# Patient Record
Sex: MALE | Race: BLACK OR AFRICAN AMERICAN | NOT HISPANIC OR LATINO | Employment: PART TIME | URBAN - METROPOLITAN AREA
[De-identification: names, ages, dates, MRNs, and addresses within clinical notes are randomized per-mention and may not be internally consistent; named-entity substitution may affect disease eponyms.]

---
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
## 2018-01-07 ENCOUNTER — HEALTH MAINTENANCE LETTER (OUTPATIENT)
Age: 18
End: 2018-01-07

## 2018-01-17 ENCOUNTER — MYC REFILL (OUTPATIENT)
Dept: FAMILY MEDICINE | Facility: CLINIC | Age: 18
End: 2018-01-17

## 2018-01-17 DIAGNOSIS — F64.0 GENDER DYSPHORIA IN ADOLESCENT AND ADULT: ICD-10-CM

## 2018-01-17 RX ORDER — TESTOSTERONE CYPIONATE 200 MG/ML
40 INJECTION, SOLUTION INTRAMUSCULAR WEEKLY
Qty: 2 ML | Refills: 3 | Status: CANCELLED | OUTPATIENT
Start: 2018-01-17

## 2018-01-17 NOTE — TELEPHONE ENCOUNTER
Message from [facility]:  Original authorizing provider: MD Paxton Loredo Masoud would like a refill of the following medications:  testosterone cypionate (DEPOTESTOTERONE) 200 MG/ML injection [Gabe Manley MD]    Preferred pharmacy: [place], [place] - [address]    Comment:

## 2018-01-17 NOTE — TELEPHONE ENCOUNTER
Request for medication refill:    Date of last visit at clinic: 10/23/17    Please complete refill if appropriate and CLOSE ENCOUNTER.    Closing the encounter signifies the refill is complete.    If refill has been denied, please complete the smart phrase .smirefuse and route it to the [facility] RN TRIAGE pool to inform the patient and the pharmacy.    Jessica Metz RN

## 2018-02-21 ENCOUNTER — MYC REFILL (OUTPATIENT)
Dept: FAMILY MEDICINE | Facility: CLINIC | Age: 18
End: 2018-02-21

## 2018-02-21 ENCOUNTER — OFFICE VISIT (OUTPATIENT)
Dept: FAMILY MEDICINE | Facility: CLINIC | Age: 18
End: 2018-02-21
Payer: COMMERCIAL

## 2018-02-21 VITALS
OXYGEN SATURATION: 100 % | SYSTOLIC BLOOD PRESSURE: 109 MMHG | HEART RATE: 79 BPM | BODY MASS INDEX: 26.08 KG/M2 | RESPIRATION RATE: 16 BRPM | TEMPERATURE: 98.4 F | WEIGHT: 142.6 LBS | DIASTOLIC BLOOD PRESSURE: 69 MMHG

## 2018-02-21 DIAGNOSIS — F64.0 GENDER DYSPHORIA IN ADOLESCENT AND ADULT: Primary | ICD-10-CM

## 2018-02-21 DIAGNOSIS — F64.0 GENDER DYSPHORIA IN ADOLESCENT AND ADULT: ICD-10-CM

## 2018-02-21 LAB
% GRANULOCYTES: 61.7 %G (ref 40–75)
ALBUMIN SERPL-MCNC: 4.7 MG/DL (ref 3.8–5)
ALP SERPL-CCNC: 53.8 U/L (ref 31.7–110.5)
ALT SERPL-CCNC: 11.3 U/L (ref 0–45)
AST SERPL-CCNC: 19 U/L (ref 0–35)
BILIRUB SERPL-MCNC: 0.5 MG/DL (ref 0.2–1.3)
BILIRUBIN DIRECT: 0.2 MG/DL (ref 0.1–0.3)
CHOLEST SERPL-MCNC: 158.8 MG/DL (ref 0–200)
CHOLEST/HDLC SERPL: 2.8 {RATIO} (ref 0–5)
GLUCOSE SERPL-MCNC: 87 MG'DL (ref 70–99)
GRANULOCYTES #: 5.4 K/UL (ref 1.6–8.3)
HCT VFR BLD AUTO: 49.7 % (ref 35–47)
HDLC SERPL-MCNC: 56.8 MG/DL
HEMOGLOBIN: 15.7 G/DL (ref 11.7–15.7)
LDLC SERPL CALC-MCNC: 74 MG/DL (ref 0–129)
LYMPHOCYTES # BLD AUTO: 2.8 K/UL (ref 0.8–5.3)
LYMPHOCYTES NFR BLD AUTO: 32.7 %L (ref 20–48)
MCH RBC QN AUTO: 31.6 PG (ref 26.5–35)
MCHC RBC AUTO-ENTMCNC: 31.6 G/DL (ref 32–36)
MCV RBC AUTO: 100.1 FL (ref 78–100)
MID #: 0.5 K/UL (ref 0–2.2)
MID %: 5.6 %M (ref 0–20)
PLATELET # BLD AUTO: 243 K/UL (ref 150–450)
PROT SERPL-MCNC: 8 G/DL (ref 6.8–8.8)
RBC # BLD AUTO: 4.97 M/UL (ref 3.8–5.2)
TRIGL SERPL-MCNC: 139.2 MG/DL (ref 0–150)
VLDL CHOLESTEROL: 27.8 MG/DL (ref 7–32)
WBC # BLD AUTO: 8.7 K/UL (ref 4–11)

## 2018-02-21 NOTE — PATIENT INSTRUCTIONS
Some online resources for transgender health    [place] Transgender Health Coalition   Home to the [facility] at [address], [phone]. Also has support groups.  http://www.[place]transhealth.org/    Center of Excellence for Transgender Health  Increasing access to comprehensive, effective, and affirming healthcare services for trans and gender-variant communities.  http://www.transhealth.[facility].edu/trans?page=mary-00-05    [facility]   Community-based non-profit committed to advancing the health & wellness of LGBTQ communities through research, education and advocacy. http://www.[facility].org    [place] [employer] [facility] Glossary of Transgender Terms  http://www.[employer].org/site/DocServer/Handout_7-C_Glossary_of_Gender_and_Transgender_Terms__fi.pdf?nkfPN=4031    Safe, gender-neutral public restrooms in [facility]   http://www.[facility]transMet[facility]Health [facility] Medical Center.org//index.php?option=com_content&task=view&id=12&Itemid    Trans Youth Support Network  For people 26 and under who identify as a trans or gender non-conforming person and want to be a part of an activist organization. Offers peer support, education and community building opportunities.   http://www.transyouthsupportnetwork.org/    Reclaim:  RECLAIM offers mental and integrative health services for LGBTQ youth and their families.  http://www.reclaim-lgbtyouth.org/    Gender Spectrum, for Trans Youth  Gender Spectrum provides education, training and support to help create a gender sensitive and inclusive environment for all children and teens. http://www.genderspectrum.org/    Brant s FTM Resource Guide  Information on topics of interest to female-to-male (FTM, F2M) trans men, and their friends and loved ones.  http://ftmguide.org/    Also check out your local [employer] resource center!  LGBTQIA Services at [facility]: http://www.[facility]/lgbtqia/  LGBTQ@[facility] at [facility]: http://www.[facility]/multiculturallife/lgbtq/  GLBTA Programs  office at [employer]: https://diversity.[facility]/glbta/

## 2018-02-21 NOTE — PROGRESS NOTES
Preceptor Attestation:  Patient seen and discussed with the resident. Assessment and plan reviewed with resident and agreed upon.  Supervising Physician:  [facility]

## 2018-02-21 NOTE — PROGRESS NOTES
"          JIGAR Matta is a 17 year old individual that uses pronouns { :921455} that presents today for follow up of:  {Masculinizin} hormone therapy. Gender identity: ***    Any special concerns today?  {CONCER:280378::\"no current concerns\"}    Start HRT in 2017.     Started college last month - wants to be . Lives with his dad. Not seeing therapist anymore, did not pay last therapist.     On hormones?  YES +++ Shot day of the week? Wednesday      Due for labs?  Yes      +++ Refills of meds needed?  No  ---    History reviewed. No pertinent surgical history.    Patient Active Problem List   Diagnosis     Gender dysphoria in adolescent and adult     Anxiety     High risk social situation       Current Outpatient Prescriptions   Medication Sig Dispense Refill     testosterone cypionate (DEPOTESTOTERONE) 200 MG/ML injection Inject 0.2 mLs (40 mg) into the muscle once a week 2 mL 3     escitalopram (LEXAPRO) 20 MG tablet Take 1/2 tablet (10 mg) for 1 week, then increase to 1 tablet orally daily 30 tablet 3     Needle, Disp, 25G X 1-1/2\" MISC Use once weekly for administering hormone IM 25 each 3     syringe, disposable, 1 ML MISC Use once weekly to draw up hormones 25 each 3     needle, disp, 18G X 1\" MISC Use to draw up hormones once weekly 25 each 3       History   Smoking Status     Never Smoker   Smokeless Tobacco     Never Used        No Known Allergies    Problem, Medication and Allergy Lists were reviewed and updated if needed..         Review of Systems:      General    Fat redistribution: {.:038588::\"no\"}    Weight change: {.:877059::\"no\"} HEENT    Voice change: YES     Cardiovascular (CV)    Chest Pains: {.:961198::\"no\"}    Shortness of breath: {.:325503::\"no\"} Chest    Decreased exercise tolerance:  {.:475504::\"no\"}    Breast changes/development: {.:447293::\"no\"}     Gastrointestinal (GI)    Abdominal pain: {.:687899::\"no\"}    Change in appetite: {.:772806::\"no\"} Skin    Acne or " "oily skin: {.:637780::\"no\"}    Change in hair: YES- facial hair     Genitourinary ()    Abnormal vaginal bleeding: no LMP 5 months ago    Decreased spontaneous erections: {.:081265::\"no\"}    Change in libido: {.:946643::\"no\"}    New sexual partners: no Musculoskeletal    Leg pain or swelling: no     Psychiatric (Psych)    Depression: YES- well controlled, taking Lexapro    Anxiety/Panic: {.:281649::\"no\"}    Mood:  {MOOD (SUBJECTIVE REPORT):808023}                    Physical Exam:     Vitals:    02/21/18 1349   BP: 109/69   Pulse: 79   Resp: 16   Temp: 98.4  F (36.9  C)   TempSrc: Oral   SpO2: 100%   Weight: 142 lb 9.6 oz (64.7 kg)     BMI= Body mass index is 26.08 kg/(m^2).   Wt Readings from Last 10 Encounters:   02/21/18 142 lb 9.6 oz (64.7 kg) (73 %)*   10/23/17 132 lb 12.8 oz (60.2 kg) (61 %)*   08/03/17 131 lb 6.4 oz (59.6 kg) (59 %)*   05/31/17 134 lb 3.2 oz (60.9 kg) (65 %)*   04/25/17 133 lb 12.8 oz (60.7 kg) (64 %)*   03/20/17 133 lb (60.3 kg) (64 %)*   01/03/17 135 lb (61.2 kg) (67 %)*   11/30/16 142 lb 9.6 oz (64.7 kg) (77 %)*   11/10/16 144 lb (65.3 kg) (79 %)*   10/19/16 149 lb 6.4 oz (67.8 kg) (83 %)*     * Growth percentiles are based on CDC 2-20 Years data.     Appearance: {SEX PARTNERS:446250::\"Male\"} appearance and dress    {EXAM NORMAL:576864::\"GENERAL:: healthy, alert and no distress\"}  Affect: {AFFECT (OBJECTIVE APPEARANCE):335579}           Labs:   {Result Choices:352811}    Assessment and Plan     1. Gender dysphoria in adolescent and adult  - will weit on Testosterone level and then will of dose needs to be adjusted    {HELP TEXT, REFERENCE for hormones at [facility]. Delete when done.}    Contraception:   {CONTRACEPTION:290069}  {HELP TEXT, Delete when done. Testosterone + uterus + sperm exposure needs contraception! Need some form of exogenous sex steroid for long term bone health IF gonadectomy  }.   Counselled patient about controlled substances: {Yes " "(Details)/No:438558010}  {HELP TEXT, Delete when done. If rx for testosterone,  pt its controlled substance, needs paper rx, can never share med. Rec use of 200mg/ml concentration if possible, testosterone cypionate  .}  Follow up:  {FOLLOW UP TIME FRAMES (DB):746153::\"Follow up in 4 months\"}  [facility]  Questions were elicited and answered.     Amee Holguin MD    "

## 2018-02-21 NOTE — MR AVS SNAPSHOT
After Visit Summary   2/21/2018    Varsha Meredith    MRN: [id number]           Patient Information     Date Of Birth          2000        Visit Information        Provider Department      2/21/2018 2:00 PM Amee Holguin MD [facility]        Today's Diagnoses     Gender dysphoria in adolescent and adult    -  1      Care Instructions      Some online resources for transgender health    [place] Transgender Health Coalition   Home to the [facility] at [address] [place] [address], [phone]. Also has support groups.  http://www.[place]transhealth.org/    Center of Excellence for Transgender Health  Increasing access to comprehensive, effective, and affirming healthcare services for trans and gender-variant communities.  http://www.transhealth.[facility].edu/trans?page=mary-00-05    [facility]   Community-based non-profit committed to advancing the health & wellness of LGBTQ communities through research, education and advocacy. http://www.[employer]ibuddies.org    [place] [facility] Glossary of Transgender Terms  http://www.[employer].org/site/DocServer/Handout_7-C_Glossary_of_Gender_and_Transgender_Terms__fi.pdf?dasMG=0763    Safe, gender-neutral public restrooms in the [facility]   http://www.[facility].org//index.php?option=com_content&task=view&id=12&Itemid    Trans Youth Support Network  For people 26 and under who identify as a trans or gender non-conforming person and want to be a part of an activist organization. Offers peer support, education and community building opportunities.   http://www.transyouthsupportnetwork.org/    Reclaim:  RECLAIM offers mental and integrative health services for LGBTQ youth and their families.  http://www.reclaim-lgbtyouth.org/    Gender Spectrum, for Trans Youth  Gender Spectrum provides education, training and support to help create a gender sensitive and inclusive environment for all children and teens.  http://www.genderspectrum.org/    Brant s FTM Resource Guide  Information on topics of interest to female-to-male (FTM, F2M) trans men, and their friends and loved ones.  http://ftmguide.org/    Also check out your local [employer] resource center!  LGBTQIA Services at [facility]: http://www.[facility]/lgbtqia/  LGBTQ@[facility] at [facility]: http://www.[facility]/lgbtq/  GLBTA Programs office at [facility]: https://diversity.[facility]/glbta/                  Follow-ups after your visit        Who to contact     Please call your clinic at [phone] to:    Ask questions about your health    Make or cancel appointments    Discuss your medicines    Learn about your test results    Speak to your doctor            Additional Information About Your Visit        [employer] Information     [employer] gives you secure access to your electronic health record. If you see a primary care provider, you can also send messages to your care team and make appointments. If you have questions, please call your primary care clinic.  If you do not have a primary care provider, please call [phone] and they will assist you.      [employer] is an electronic gateway that provides easy, online access to your medical records. With [employer], you can request a clinic appointment, read your test results, renew a prescription or communicate with your care team.     To access your existing account, please contact your [facility] or call [phone] for assistance.        Care EveryWhere ID     This is your Care EveryWhere ID. This could be used by other organizations to access your [facility] medical records  [id number]        Your Vitals Were     Pulse Temperature Respirations Pulse Oximetry BMI (Body Mass Index)       79 98.4  F (36.9  C) (Oral) 16 100% 26.08 kg/m2        Blood Pressure from Last 3 Encounters:   02/21/18 109/69   10/23/17 95/52   08/03/17 97/64    Weight from Last 3 Encounters:  "  02/21/18 142 lb 9.6 oz (64.7 kg) (73 %)*   10/23/17 132 lb 12.8 oz (60.2 kg) (61 %)*   08/03/17 131 lb 6.4 oz (59.6 kg) (59 %)*     * Growth percentiles are based on [facility] 2-20 Years data.              We Performed the Following     CBC with Diff Plt     Glucose     Hepatic Panel     Lipid Cascade     Testosterone Total        Primary Care Provider Office Phone # Fax #    Amee Holguin [phone]       [facility] [address] [facility] [address]        Equal Access to Services     CARRIE BARCENAS : Hadii hunter Forde, waaxda gogo, qaybta kaalmada cl, dany valdivia . So [facility] [phone].    ATENCIÓN: Si habla español, tiene a brothers disposición servicios gratuitos de asistencia lingüística. Llame al [phone].    We comply with applicable federal civil rights laws and [place] laws. We do not discriminate on the basis of race, color, national origin, age, disability, sex, sexual orientation, or gender identity.            Thank you!     Thank you for choosing [facility]  for your care. Our goal is always to provide you with excellent care. Hearing back from our patients is one way we can continue to improve our services. Please take a few minutes to complete the written survey that you may receive in the mail after your visit with us. Thank you!             Your Updated Medication List - Protect others around you: Learn how to safely use, store and throw away your medicines at www.disposemymeds.org.          This list is accurate as of 2/21/18  2:46 PM.  Always use your most recent med list.                   Brand Name Dispense Instructions for use Diagnosis    escitalopram 20 MG tablet    LEXAPRO    30 tablet    Take 1/2 tablet (10 mg) for 1 week, then increase to 1 tablet orally daily    Adjustment disorder with depressed mood       needle (disp) 18G X 1\" Misc     25 each    Use to draw up hormones once " "weekly    Gender dysphoria in adolescent and adult       Needle (Disp) 25G X 1-1/2\" Misc     25 each    Use once weekly for administering hormone IM    Gender dysphoria in adolescent and adult       syringe (disposable) 1 ML Misc     25 each    Use once weekly to draw up hormones    Gender dysphoria in adolescent and adult       testosterone cypionate 200 MG/ML injection    DEPOTESTOTERONE    2 mL    Inject 0.2 mLs (40 mg) into the muscle once a week    Gender dysphoria in adolescent and adult         "

## 2018-02-21 NOTE — PROGRESS NOTES
"       JIGAR Matta is a 17 year old individual that uses pronouns He/Him/His/Himself that presents today for follow up of:  masculinizing hormone therapy. Gender identity: Male    Any special concerns today?  no current concerns    Started HRT in 4/2017.     Started college last month - wants to be . Lives with his dad. Not seeing therapist anymore, did not pay last therapist.     On hormones?  YES +++ Shot day of the week? Wednesday      Due for labs?  Yes      +++ Refills of meds needed?  Yes  ---    History reviewed. No pertinent surgical history.    Patient Active Problem List   Diagnosis     Gender dysphoria in adolescent and adult     Anxiety     High risk social situation       Current Outpatient Prescriptions   Medication Sig Dispense Refill     testosterone cypionate (DEPOTESTOTERONE) 200 MG/ML injection Inject 0.2 mLs (40 mg) into the muscle once a week 2 mL 3     escitalopram (LEXAPRO) 20 MG tablet Take 1/2 tablet (10 mg) for 1 week, then increase to 1 tablet orally daily 30 tablet 3     Needle, Disp, 25G X 1-1/2\" MISC Use once weekly for administering hormone IM 25 each 3     syringe, disposable, 1 ML MISC Use once weekly to draw up hormones 25 each 3     needle, disp, 18G X 1\" MISC Use to draw up hormones once weekly 25 each 3       History   Smoking Status     Never Smoker   Smokeless Tobacco     Never Used        No Known Allergies    Problem, Medication and Allergy Lists were reviewed and are current.         Review of Systems:      General    Fat redistribution: no    Weight change: no HEENT    Voice change: YES     Cardiovascular (CV)    Chest Pains: no    Shortness of breath: no Chest    Decreased exercise tolerance:  no    Breast changes/development: no     Gastrointestinal (GI)    Abdominal pain: no    Change in appetite: no Skin    Acne or oily skin: no    Change in hair: no     Genitourinary ()    Abnormal vaginal bleeding: no     Decreased spontaneous erections: not " "applicable    Change in libido: YES- increased    New sexual partners: no Musculoskeletal    Leg pain or swelling: no     Psychiatric (Psych)    Depression: no    Anxiety/Panic: no    Mood:  \"good\"                    Physical Exam:     Vitals:    02/21/18 1349   BP: 109/69   Pulse: 79   Resp: 16   Temp: 98.4  F (36.9  C)   TempSrc: Oral   SpO2: 100%   Weight: 142 lb 9.6 oz (64.7 kg)     BMI= Body mass index is 26.08 kg/(m^2).   Wt Readings from Last 10 Encounters:   02/21/18 142 lb 9.6 oz (64.7 kg) (73 %)*   10/23/17 132 lb 12.8 oz (60.2 kg) (61 %)*   08/03/17 131 lb 6.4 oz (59.6 kg) (59 %)*   05/31/17 134 lb 3.2 oz (60.9 kg) (65 %)*   04/25/17 133 lb 12.8 oz (60.7 kg) (64 %)*   03/20/17 133 lb (60.3 kg) (64 %)*   01/03/17 135 lb (61.2 kg) (67 %)*   11/30/16 142 lb 9.6 oz (64.7 kg) (77 %)*   11/10/16 144 lb (65.3 kg) (79 %)*   10/19/16 149 lb 6.4 oz (67.8 kg) (83 %)*     * Growth percentiles are based on [facility] 2-20 Years data.     Appearance: Both appearance and dress    GENERAL:: healthy, alert and no distress  RESP: lungs clear to auscultation - no rales, no rhonchi, no wheezes  CV: regular rates and rhythm, normal S1 S2, no S3 or S4 and no murmur, no click or rub -  Affect: Appropriate/mood-congruent Seems to much happier           Labs:   HRT follow up labs obtained.   Assessment and Plan     ## Gender dysphoria - follow up and doing very well.   - encouraged to find a new (affordable) therapist. Provided with transgender health resources.   - continue with the current treatment - no changes made today.     Contraception:   not needed     Counselled patient about controlled substances: Yes.     Follow up:  Follow up in 3 months.  Results by mychart  Questions were elicited and answered.       Amee Holguin MD, PhD  [facility],  PGY-3 Family Medicine Resident  #[id number]    "

## 2018-02-22 PROBLEM — Z60.9 HIGH RISK SOCIAL SITUATION: Status: RESOLVED | Noted: 2017-03-25 | Resolved: 2018-02-22

## 2018-02-22 NOTE — TELEPHONE ENCOUNTER
Message from [employer]:  Original authorizing provider: MD Paxton Loredo Masoud would like a refill of the following medications:  testosterone cypionate (DEPOTESTOTERONE) 200 MG/ML injection [Gabe Manley MD]    Preferred pharmacy: [place], [place] - [address]    Comment:

## 2018-02-22 NOTE — TELEPHONE ENCOUNTER
Date of last visit at clinic: 2/21/18    Please complete refill and CLOSE ENCOUNTER.  Closing the encounter signifies the refill is complete.    Lakeshia Cassidy RN

## 2018-02-22 NOTE — TELEPHONE ENCOUNTER
Please process per controlled substance protocol. I have reviewed pt's chart and this refill is appropriate. Please provide rx to preceptor to sign, then provide to patient's pharmacy and notify patient.     Amee Holguin MD  [facility],  PGY-3 Family Medicine Resident  #[id number]

## 2018-02-23 RX ORDER — TESTOSTERONE CYPIONATE 200 MG/ML
40 INJECTION, SOLUTION INTRAMUSCULAR WEEKLY
Qty: 2 ML | Refills: 3 | Status: SHIPPED | OUTPATIENT
Start: 2018-02-23 | End: 2018-03-16

## 2018-02-24 LAB — TESTOST SERPL-MCNC: 279 NG/DL (ref 8–60)

## 2018-04-24 ENCOUNTER — OFFICE VISIT (OUTPATIENT)
Dept: FAMILY MEDICINE | Facility: CLINIC | Age: 18
End: 2018-04-24
Payer: COMMERCIAL

## 2018-04-24 VITALS
WEIGHT: 150.8 LBS | SYSTOLIC BLOOD PRESSURE: 115 MMHG | TEMPERATURE: 98.1 F | OXYGEN SATURATION: 98 % | HEART RATE: 87 BPM | DIASTOLIC BLOOD PRESSURE: 76 MMHG | BODY MASS INDEX: 27.58 KG/M2

## 2018-04-24 DIAGNOSIS — F64.0 GENDER DYSPHORIA IN ADOLESCENT AND ADULT: Primary | ICD-10-CM

## 2018-04-24 RX ORDER — TESTOSTERONE CYPIONATE 200 MG/ML
60 INJECTION, SOLUTION INTRAMUSCULAR WEEKLY
Qty: 2 ML | Refills: 0 | Status: SHIPPED | OUTPATIENT
Start: 2018-04-24 | End: 2018-06-20

## 2018-04-24 NOTE — PROGRESS NOTES
"            JIGAR Matta is a 17 year old individual that uses pronouns He/Him/His/Himself that presents today for follow up of:  masculinizing hormone therapy. Gender identity: male    Any special concerns today?  no current concerns  Started HRT 4/26/2017, satisfied with changes    On hormones?  YES +++ Shot day of the week? Wednesday      Due for labs?  No      +++ Refills of meds needed?  No  ---    History reviewed. No pertinent surgical history.    Patient Active Problem List   Diagnosis     Gender dysphoria in adolescent and adult     Anxiety       Current Outpatient Prescriptions   Medication Sig Dispense Refill     escitalopram (LEXAPRO) 20 MG tablet Take 1/2 tablet (10 mg) for 1 week, then increase to 1 tablet orally daily 30 tablet 3     needle, disp, 18G X 1\" MISC Use to draw up hormones once weekly 25 each 3     Needle, Disp, 25G X 1-1/2\" MISC Use once weekly for administering hormone IM 25 each 3     syringe, disposable, 1 ML MISC Use once weekly to draw up hormones 25 each 3     testosterone cypionate (DEPOTESTOTERONE) 200 MG/ML injection Inject 0.2 mLs (40 mg) into the muscle once a week 2 mL 3       History   Smoking Status     Never Smoker   Smokeless Tobacco     Never Used        No Known Allergies    Problem, Medication and Allergy Lists were reviewed and updated if needed..         Review of Systems:      General    Fat redistribution: no    Weight change: no HEENT    Voice change: YES     Cardiovascular (CV)    Chest Pains: no    Shortness of breath: no Chest    Decreased exercise tolerance:  no    Breast changes/development: not applicable     Gastrointestinal (GI)    Abdominal pain: no    Change in appetite: no Skin    Acne or oily skin: no    Change in hair: no     Genitourinary ()    Abnormal vaginal bleeding: no     Decreased spontaneous erections: not applicable    Change in libido: YES    New sexual partners: no Musculoskeletal    Leg pain or swelling: no     Psychiatric " "(Psych)    Depression: no    Anxiety/Panic: no    Mood:  \"good\"                    Physical Exam:     Vitals:    04/24/18 1513   BP: 115/76   Pulse: 87   Temp: 98.1  F (36.7  C)   TempSrc: Oral   SpO2: 98%   Weight: 150 lb 12.8 oz (68.4 kg)     BMI= Body mass index is 27.58 kg/(m^2).   Wt Readings from Last 10 Encounters:   04/24/18 150 lb 12.8 oz (68.4 kg) (81 %)*   02/21/18 142 lb 9.6 oz (64.7 kg) (73 %)*   10/23/17 132 lb 12.8 oz (60.2 kg) (61 %)*   08/03/17 131 lb 6.4 oz (59.6 kg) (59 %)*   05/31/17 134 lb 3.2 oz (60.9 kg) (65 %)*   04/25/17 133 lb 12.8 oz (60.7 kg) (64 %)*   03/20/17 133 lb (60.3 kg) (64 %)*   01/03/17 135 lb (61.2 kg) (67 %)*   11/30/16 142 lb 9.6 oz (64.7 kg) (77 %)*   11/10/16 144 lb (65.3 kg) (79 %)*     * Growth percentiles are based on [facility] 2-20 Years data.     Appearance: Male appearance and dress    GENERAL: healthy, alert and no distress  RESP: lungs clear to auscultation - no rales, no rhonchi, no wheezes  CV: regular rates and rhythm, normal S1 S2, no S3 or S4 and no murmur, no click or rub -  Affect: Appropriate/mood-congruent           Labs:   No pending results.   Assessment and Plan     ## Gender dysphoria in adolescent and adult  ## Depression with anxiety  - testosterone cypionate (DEPOTESTOTERONE) 200 MG/ML injection; Inject 0.3 mLs (60 mg) into the muscle once a week  Dispense: 2 mL; Refill: 0  - patient was advised to remember that he has all other refills from February and March and call pharmacy first of you need refills  - follow up in 1 month, will need labs  - recommended to continue/reestablish therapy  - continue with Lexapro for depression/anxiety which are well controlled at this point    Contraception:   not needed    Counselled patient about controlled substances: Yes.    Follow up:  Follow up in 1-3 month, will need labs  Results by carleen  Questions were elicited and answered.     Amee Holguin MD, PhD  [facility],  PGY-3 " Family Medicine Resident  #[id number]

## 2018-04-24 NOTE — MR AVS SNAPSHOT
After Visit Summary   4/24/2018    Varsha Meredith    MRN: [id number]           Patient Information     Date Of Birth          2000        Visit Information        Provider Department      4/24/2018 3:20 PM Amee Holguin MD [facility]        Today's Diagnoses     Gender dysphoria in adolescent and adult    -  1      Care Instructions    Here is the plan from today's visit    1. Gender dysphoria in adolescent and adult  - testosterone cypionate (DEPOTESTOTERONE) 200 MG/ML injection; Inject 0.3 mLs (60 mg) into the muscle once a week  Dispense: 2 mL; Refill: 0  - remember that you have all other refills form February and March so please call your pharmacy first of you need refills  - follow up in 1 month, will do labs then        Thank you for coming to [facility] today.  Lab Testing:  **If you had lab testing today and your results are reassuring or normal they will be mailed to you or sent through [employer] within 7 days.   **If the lab tests need quick action we will call you with the results.  The phone number we will call with results is # [phone] (home) . If this is not the best number please call our clinic and change the number.  Medication Refills:  If you need any refills please call your pharmacy and they will contact us.   If you need to  your refill at a new pharmacy, please contact the new pharmacy directly. The new pharmacy will help you get your medications transferred faster.   Scheduling:  If you have any concerns about today's visit or wish to schedule another appointment please call our office during normal business hours [phone] (8-5:00 M-F)  If a referral was made to a [facility] and you don't get a call from central scheduling please call [phone].  If a Mammogram was ordered for you at [facility] call [phone] to schedule or change your appointment.  If you had an XRay/CT/Ultrasound/MRI  ordered the number is [phone] to schedule or change your radiology appointment.   Medical Concerns:  If you have urgent medical concerns please call [phone] at any time of the day.    Amee Holguin MD            Follow-ups after your visit        Who to contact     Please call your clinic at [phone] to:    Ask questions about your health    Make or cancel appointments    Discuss your medicines    Learn about your test results    Speak to your doctor            Additional Information About Your Visit        [employer] Information     [employer] gives you secure access to your electronic health record. If you see a primary care provider, you can also send messages to your care team and make appointments. If you have questions, please call your primary care clinic.  If you do not have a primary care provider, please call [phone] and they will assist you.      [employer] is an electronic gateway that provides easy, online access to your medical records. With [employer], you can request a clinic appointment, read your test results, renew a prescription or communicate with your care team.     To access your existing account, please contact your [facility] or call [phone] for assistance.        Care EveryWhere ID     This is your Care EveryWhere ID. This could be used by other organizations to access your [place] medical records  [id number]        Your Vitals Were     Pulse Temperature Pulse Oximetry Breastfeeding? BMI (Body Mass Index)       87 98.1  F (36.7  C) (Oral) 98% No 27.58 kg/m2        Blood Pressure from Last 3 Encounters:   04/24/18 115/76   02/21/18 109/69   10/23/17 95/52    Weight from Last 3 Encounters:   04/24/18 150 lb 12.8 oz (68.4 kg) (81 %)*   02/21/18 142 lb 9.6 oz (64.7 kg) (73 %)*   10/23/17 132 lb 12.8 oz (60.2 kg) (61 %)*     * Growth percentiles are based on CDC 2-20 Years data.              Today, you had the following     No orders found for  display         Today's Medication Changes          These changes are accurate as of 4/24/18  3:42 PM.  If you have any questions, ask your nurse or doctor.               These medicines have changed or have updated prescriptions.        Dose/Directions    testosterone cypionate 200 MG/ML injection   Commonly known as:  DEPOTESTOTERONE   This may have changed:  how much to take   Used for:  Gender dysphoria in adolescent and adult   Changed by:  Amee Holguin MD        Dose:  60 mg   Inject 0.3 mLs (60 mg) into the muscle once a week   Quantity:  2 mL   Refills:  0            Where to get your medicines      Some of these will need a paper prescription and others can be bought over the counter.  Ask your nurse if you have questions.     Bring a paper prescription for each of these medications     testosterone cypionate 200 MG/ML injection                Primary Care Provider Office Phone # Fax #    Amee Holguin [phone]       [facility] [address] [facility] [address]        Equal Access to Services     CARRIE BARCENAS AH: Hadsaige vilelgaso Sodeonte, waaxda luqadaha, qaybta kaalmada adedoroteoyablaise, dany valdivia . So [facility] [phone].    ATENCIÓN: Si cynthiala kike, tiene a brothers disposición servicios gratuitos de asistencia lingüística. Llame al [phone].    We comply with applicable federal civil rights laws and [place] laws. We do not discriminate on the basis of race, color, national origin, age, disability, sex, sexual orientation, or gender identity.            Thank you!     Thank you for choosing [facility]  for your care. Our goal is always to provide you with excellent care. Hearing back from our patients is one way we can continue to improve our services. Please take a few minutes to complete the written survey that you may receive in the mail after your visit with us. Thank you!             Your Updated  "Medication List - Protect others around you: Learn how to safely use, store and throw away your medicines at www.disposemymeds.org.          This list is accurate as of 4/24/18  3:42 PM.  Always use your most recent med list.                   Brand Name Dispense Instructions for use Diagnosis    escitalopram 20 MG tablet    LEXAPRO    30 tablet    Take 1/2 tablet (10 mg) for 1 week, then increase to 1 tablet orally daily    Adjustment disorder with depressed mood       needle (disp) 18G X 1\" Misc     25 each    Use to draw up hormones once weekly    Gender dysphoria in adolescent and adult       Needle (Disp) 25G X 1-1/2\" Misc     25 each    Use once weekly for administering hormone IM    Gender dysphoria in adolescent and adult       syringe (disposable) 1 ML Misc     25 each    Use once weekly to draw up hormones    Gender dysphoria in adolescent and adult       testosterone cypionate 200 MG/ML injection    DEPOTESTOTERONE    2 mL    Inject 0.3 mLs (60 mg) into the muscle once a week    Gender dysphoria in adolescent and adult         "

## 2018-04-24 NOTE — PATIENT INSTRUCTIONS
Here is the plan from today's visit    1. Gender dysphoria in adolescent and adult  - testosterone cypionate (DEPOTESTOTERONE) 200 MG/ML injection; Inject 0.3 mLs (60 mg) into the muscle once a week  Dispense: 2 mL; Refill: 0  - remember that you have all other refills form February and March so please call your pharmacy first of you need refills  - follow up in 1 month, will do labs then        Thank you for coming to [facility] today.  Lab Testing:  **If you had lab testing today and your results are reassuring or normal they will be mailed to you or sent through [employer] within 7 days.   **If the lab tests need quick action we will call you with the results.  The phone number we will call with results is # [phone] (home) . If this is not the best number please call our clinic and change the number.  Medication Refills:  If you need any refills please call your pharmacy and they will contact us.   If you need to  your refill at a new pharmacy, please contact the new pharmacy directly. The new pharmacy will help you get your medications transferred faster.   Scheduling:  If you have any concerns about today's visit or wish to schedule another appointment please call our office during normal business hours [phone] (8-5:00 M-F)  If a referral was made to a [facility] and you don't get a call from central scheduling please call [phone].  If a Mammogram was ordered for you at [facility] call [phone] to schedule or change your appointment.  If you had an XRay/CT/Ultrasound/MRI ordered the number is [phone] to schedule or change your radiology appointment.   Medical Concerns:  If you have urgent medical concerns please call [phone] at any time of the day.    Amee Holguin MD

## 2018-05-01 NOTE — PROGRESS NOTES
Preceptor Attestation:   Patient seen, evaluated and discussed with the resident.   I have verified the content of the note, which accurately reflects my assessment of the patient and the plan of care.   Supervising Physician:  Nathaniel Esqueda MD

## 2018-05-10 DIAGNOSIS — F64.0 GENDER DYSPHORIA IN ADULT: Primary | ICD-10-CM

## 2018-05-16 DIAGNOSIS — F64.0 GENDER DYSPHORIA IN ADULT: ICD-10-CM

## 2018-05-16 NOTE — TELEPHONE ENCOUNTER
Request for medication refill:    Date of last visit at clinic: 4/24/2018    Please complete refill if appropriate and CLOSE ENCOUNTER.    Closing the encounter signifies the refill is complete.    If refill has been denied, please complete the smart phrase .smirefuse and route it to the [facility] RN TRIAGE pool to inform the patient and the pharmacy.    Nadiya Perez, CMA

## 2018-05-21 ENCOUNTER — OFFICE VISIT (OUTPATIENT)
Dept: FAMILY MEDICINE | Facility: CLINIC | Age: 18
End: 2018-05-21
Payer: COMMERCIAL

## 2018-05-21 VITALS
BODY MASS INDEX: 27.65 KG/M2 | TEMPERATURE: 97.9 F | DIASTOLIC BLOOD PRESSURE: 61 MMHG | OXYGEN SATURATION: 98 % | SYSTOLIC BLOOD PRESSURE: 95 MMHG | WEIGHT: 151.2 LBS | HEART RATE: 95 BPM | RESPIRATION RATE: 16 BRPM

## 2018-05-21 DIAGNOSIS — F64.0 GENDER DYSPHORIA IN ADOLESCENT AND ADULT: Primary | ICD-10-CM

## 2018-05-21 LAB
% GRANULOCYTES: 62.8 %G (ref 40–75)
ALBUMIN SERPL-MCNC: 4.7 MG/DL (ref 3.8–5)
ALP SERPL-CCNC: 53.8 U/L (ref 31.7–110.5)
ALT SERPL-CCNC: 9.8 U/L (ref 0–45)
AST SERPL-CCNC: 16.8 U/L (ref 0–35)
BILIRUB SERPL-MCNC: 0.7 MG/DL (ref 0.2–1.3)
BILIRUBIN DIRECT: 0.3 MG/DL (ref 0.1–0.3)
CHOLEST SERPL-MCNC: 143.7 MG/DL (ref 0–200)
CHOLEST/HDLC SERPL: 3 {RATIO} (ref 0–5)
GLUCOSE SERPL-MCNC: 96 MG'DL (ref 70–99)
GRANULOCYTES #: 4.3 K/UL (ref 1.6–8.3)
HCT VFR BLD AUTO: 49.6 % (ref 35–47)
HDLC SERPL-MCNC: 48.5 MG/DL
HEMOGLOBIN: 15.4 G/DL (ref 11.7–15.7)
LDLC SERPL CALC-MCNC: 86 MG/DL (ref 0–129)
LYMPHOCYTES # BLD AUTO: 2 K/UL (ref 0.8–5.3)
LYMPHOCYTES NFR BLD AUTO: 29.9 %L (ref 20–48)
MCH RBC QN AUTO: 30.4 PG (ref 26.5–35)
MCHC RBC AUTO-ENTMCNC: 31 G/DL (ref 32–36)
MCV RBC AUTO: 97.8 FL (ref 78–100)
MID #: 0.5 K/UL (ref 0–2.2)
MID %: 7.3 %M (ref 0–20)
PLATELET # BLD AUTO: 209 K/UL (ref 150–450)
PROT SERPL-MCNC: 7.2 G/DL (ref 6.8–8.8)
RBC # BLD AUTO: 5.07 M/UL (ref 3.8–5.2)
TRIGL SERPL-MCNC: 46.8 MG/DL (ref 0–150)
VLDL CHOLESTEROL: 9.4 MG/DL (ref 7–32)
WBC # BLD AUTO: 6.8 K/UL (ref 4–11)

## 2018-05-21 NOTE — MR AVS SNAPSHOT
After Visit Summary   5/21/2018    Varsha Meredith    MRN: [id number]           Patient Information     Date Of Birth          2000        Visit Information        Provider Department      5/21/2018 2:40 PM Amee Holguin MD [facility]        Today's Diagnoses     Gender dysphoria in adolescent and adult    -  1       Follow-ups after your visit        Follow-up notes from your care team     Return in about 3 months (around 8/21/2018).      Who to contact     Please call your clinic at [phone] to:    Ask questions about your health    Make or cancel appointments    Discuss your medicines    Learn about your test results    Speak to your doctor            Additional Information About Your Visit        [employer] Information     [employer] gives you secure access to your electronic health record. If you see a primary care provider, you can also send messages to your care team and make appointments. If you have questions, please call your primary care clinic.  If you do not have a primary care provider, please call [phone] and they will assist you.      [employer] is an electronic gateway that provides easy, online access to your medical records. With [employer], you can request a clinic appointment, read your test results, renew a prescription or communicate with your care team.     To access your existing account, please contact your [facility] or call [phone] for assistance.        Care EveryWhere ID     This is your Care EveryWhere ID. This could be used by other organizations to access your [place] medical records  [id number]        Your Vitals Were     Pulse Temperature Respirations Pulse Oximetry BMI (Body Mass Index)       95 97.9  F (36.6  C) (Oral) 16 98% 27.65 kg/m2        Blood Pressure from Last 3 Encounters:   05/21/18 95/61   04/24/18 115/76   02/21/18 109/69    Weight from Last 3 Encounters:   05/21/18 151 lb 3.2 oz  "(68.6 kg) (81 %)*   04/24/18 150 lb 12.8 oz (68.4 kg) (81 %)*   02/21/18 142 lb 9.6 oz (64.7 kg) (73 %)*     * Growth percentiles are based on [facility] 2-20 Years data.              We Performed the Following     CBC with Diff Plt     Glucose     Hepatic Panel     Lipid Cascade     Testosterone Total          Today's Medication Changes          These changes are accurate as of 5/21/18 11:59 PM.  If you have any questions, ask your nurse or doctor.               Start taking these medicines.        Dose/Directions    Needle (Disp) 25G X 1-1/2\" Misc   Used for:  Gender dysphoria in adolescent and adult   Started by:  Amee Holguin MD        Use once weekly for administering hormone IM   Quantity:  25 each   Refills:  3            Where to get your medicines      These medications were sent to [facility] [address] [facility] - [place] [address]  [address] , [facility] [address]     Phone:  [phone]     Needle (Disp) 25G X 1-1/2\" Misc                Primary Care Provider Office Phone # Fax #    Amee Holguin [phone]       [facility] [address]   [facility] [address]        Equal Access to Services     CARRIE BARCENAS AH: Hadii hunter ku hadasho Soomaali, waaxda luqadaha, qaybta kaalmada adeegyada, waxay idiin haydaltonn sydney robbins. So [facility] [phone].    ATENCIÓN: Si habla español, tiene a brothers disposición servicios gratuitos de asistencia lingüística. Llame al [phone].    We comply with applicable federal civil rights laws and [place] laws. We do not discriminate on the basis of race, color, national origin, age, disability, sex, sexual orientation, or gender identity.            Thank you!     Thank you for choosing [facility]  for your care. Our goal is always to provide you with excellent care. Hearing back from our patients is one way we can continue to improve our services. Please take a few minutes to complete the written " "survey that you may receive in the mail after your visit with us. Thank you!             Your Updated Medication List - Protect others around you: Learn how to safely use, store and throw away your medicines at www.disposemymeds.org.          This list is accurate as of 5/21/18 11:59 PM.  Always use your most recent med list.                   Brand Name Dispense Instructions for use Diagnosis    escitalopram 20 MG tablet    LEXAPRO    30 tablet    Take 1/2 tablet (10 mg) for 1 week, then increase to 1 tablet orally daily    Adjustment disorder with depressed mood       Needle (Disp) 25G X 1-1/2\" Misc     25 each    Use once weekly for administering hormone IM    Gender dysphoria in adolescent and adult       syringe (disposable) 1 ML Misc     25 each    Use to draw up hormones once weekly    Gender dysphoria in adult       testosterone cypionate 200 MG/ML injection    DEPOTESTOTERONE    2 mL    Inject 0.3 mLs (60 mg) into the muscle once a week    Gender dysphoria in adolescent and adult         "

## 2018-05-21 NOTE — PROGRESS NOTES
JIGAR Matta is a 17 year old individual that uses pronouns He/Him/His/Himself that presents today for follow up of:  masculinizing hormone therapy. Gender identity: male.     Moved to a new house yesterday.     Any special concerns today?  no current concerns    On hormones?  YES +++ Shot day of the week? Wednesday      Due for labs?  Yes      +++ Refills of meds needed?  No    Seeing therapist almost every week (Satursdays).   ---    History reviewed. No pertinent surgical history.    Patient Active Problem List   Diagnosis     Gender dysphoria in adolescent and adult     Anxiety       Current Outpatient Prescriptions   Medication Sig Dispense Refill     escitalopram (LEXAPRO) 20 MG tablet Take 1/2 tablet (10 mg) for 1 week, then increase to 1 tablet orally daily 30 tablet 3     syringe, disposable, 1 ML MISC Use to draw up hormones once weekly 25 each 3     testosterone cypionate (DEPOTESTOTERONE) 200 MG/ML injection Inject 0.3 mLs (60 mg) into the muscle once a week 2 mL 0       History   Smoking Status     Never Smoker   Smokeless Tobacco     Never Used        No Known Allergies    Problem, Medication and Allergy Lists were reviewed and updated if needed..         Review of Systems:      General    Fat redistribution: no    Weight change: no HEENT    Voice change: YES     Cardiovascular (CV)    Chest Pains: no    Shortness of breath: no Chest    Decreased exercise tolerance:  no    Breast changes/development: noh    Has some chest discomfort form the breast binder. Started thinking about top surgery but not ready yet.      Gastrointestinal (GI)    Abdominal pain: no    Change in appetite: no Skin    Acne or oily skin: no    Change in hair: no     Genitourinary ()    Abnormal vaginal bleeding: no     Decreased spontaneous erections: not applicable    Change in libido: no    New sexual partners: no Musculoskeletal    Leg pain or swelling: no     Psychiatric (Psych)    Depression:  "no    Anxiety/Panic: no    Mood:  \"good\"                    Physical Exam:     Vitals:    05/21/18 1441   BP: 95/61   BP Location: Left arm   Patient Position: Sitting   Cuff Size: Adult Regular   Pulse: 95   Resp: 16   Temp: 97.9  F (36.6  C)   TempSrc: Oral   SpO2: 98%   Weight: 151 lb 3.2 oz (68.6 kg)     BMI= Body mass index is 27.65 kg/(m^2).   Wt Readings from Last 10 Encounters:   05/21/18 151 lb 3.2 oz (68.6 kg) (81 %)*   04/24/18 150 lb 12.8 oz (68.4 kg) (81 %)*   02/21/18 142 lb 9.6 oz (64.7 kg) (73 %)*   10/23/17 132 lb 12.8 oz (60.2 kg) (61 %)*   08/03/17 131 lb 6.4 oz (59.6 kg) (59 %)*   05/31/17 134 lb 3.2 oz (60.9 kg) (65 %)*   04/25/17 133 lb 12.8 oz (60.7 kg) (64 %)*   03/20/17 133 lb (60.3 kg) (64 %)*   01/03/17 135 lb (61.2 kg) (67 %)*   11/30/16 142 lb 9.6 oz (64.7 kg) (77 %)*     * Growth percentiles are based on [facility] 2-20 Years data.     Appearance: Both appearance and dress    GENERAL: healthy, alert and no distress  RESP: lungs clear to auscultation - no rales, no rhonchi, no wheezes  CV: regular rates and rhythm, normal S1 S2, no S3 or S4 and no murmur, no click or rub -  Affect: Appropriate/mood-congruent           Labs:   HRT labs obtained today  Assessment and Plan     1. Gender dysphoria in adolescent and adult  - continue with Testosterone at 60 mg weekly, no changes made today  - recommend to continue to see therapist  - follow up in 3 months  - discussed referral for Dr Altamirano to discuss top surgery however Paxton is not ready yet.     Contraception:   abstinence     Counselled patient about controlled substances: Yes.    Follow up:  Follow up in 3 months.  Results by mychart  Questions were elicited and answered.     Amee Holguin MD, PhD  [facility] [employer],  PGY-3 Family Medicine Resident  #[id number]      "

## 2018-05-23 LAB — TESTOST SERPL-MCNC: 446 NG/DL (ref 8–60)

## 2018-06-20 ENCOUNTER — MYC REFILL (OUTPATIENT)
Dept: FAMILY MEDICINE | Facility: CLINIC | Age: 18
End: 2018-06-20

## 2018-06-20 DIAGNOSIS — F64.0 GENDER DYSPHORIA IN ADOLESCENT AND ADULT: ICD-10-CM

## 2018-06-21 RX ORDER — TESTOSTERONE CYPIONATE 200 MG/ML
60 INJECTION, SOLUTION INTRAMUSCULAR WEEKLY
Qty: 2 ML | Refills: 3 | Status: SHIPPED | OUTPATIENT
Start: 2018-06-21 | End: 2018-10-12

## 2018-06-21 NOTE — TELEPHONE ENCOUNTER
Message from MyChart:  Original authorizing provider: Nathaniel Esqueda MD    Paxton Meredith would like a refill of the following medications:  testosterone cypionate (DEPOTESTOTERONE) 200 MG/ML injection [Nathaniel Esqueda MD]    Preferred pharmacy: [place], [place] - [address]    Comment:

## 2018-06-21 NOTE — TELEPHONE ENCOUNTER

## 2018-06-21 NOTE — TELEPHONE ENCOUNTER
Please process per controlled substance protocol. I have reviewed pt's chart and this refill is appropriate. Please provide rx to preceptor to sign, then provide to patient's pharmacy and notify patient.     Amee Holguin MD  [facility],  PGY-3 Family Medicine Resident  #[id number]

## 2018-08-01 ENCOUNTER — MYC REFILL (OUTPATIENT)
Dept: FAMILY MEDICINE | Facility: CLINIC | Age: 18
End: 2018-08-01

## 2018-08-01 DIAGNOSIS — F64.0 GENDER DYSPHORIA IN ADOLESCENT AND ADULT: ICD-10-CM

## 2018-08-01 RX ORDER — TESTOSTERONE CYPIONATE 200 MG/ML
60 INJECTION, SOLUTION INTRAMUSCULAR WEEKLY
Qty: 2 ML | Refills: 3 | Status: CANCELLED | OUTPATIENT
Start: 2018-08-01

## 2018-08-02 NOTE — TELEPHONE ENCOUNTER
Message from MyChart:  Original authorizing provider: MD Paxton Schaefer would like a refill of the following medications:  testosterone cypionate (DEPOTESTOTERONE) 200 MG/ML injection [Natalya Araiza MD]    Preferred pharmacy: [place], [place] - [address]    Comment:

## 2018-08-14 ENCOUNTER — OFFICE VISIT (OUTPATIENT)
Dept: FAMILY MEDICINE | Facility: CLINIC | Age: 18
End: 2018-08-14
Payer: COMMERCIAL

## 2018-08-14 VITALS
TEMPERATURE: 97.6 F | SYSTOLIC BLOOD PRESSURE: 108 MMHG | BODY MASS INDEX: 27.73 KG/M2 | WEIGHT: 151.6 LBS | RESPIRATION RATE: 16 BRPM | DIASTOLIC BLOOD PRESSURE: 72 MMHG | OXYGEN SATURATION: 99 % | HEART RATE: 82 BPM

## 2018-08-14 DIAGNOSIS — F64.0 GENDER DYSPHORIA IN ADULT: ICD-10-CM

## 2018-08-14 DIAGNOSIS — F64.0 GENDER DYSPHORIA IN ADOLESCENT AND ADULT: ICD-10-CM

## 2018-08-14 NOTE — MR AVS SNAPSHOT
"              After Visit Summary   8/14/2018    Varsha Meredith    MRN: [id number]           Patient Information     Date Of Birth          2000        Visit Information        Provider Department      8/14/2018 1:00 PM Lisa Hernandez MD \[facility]        Today's Diagnoses     Gender dysphoria in adult        Gender dysphoria in adolescent and adult          Care Instructions    Here is the plan from today's visit    1. Gender dysphoria in adult  - syringe, disposable, 1 ML MISC; Use to draw up hormones once weekly  Dispense: 25 each; Refill: 3  - needle, disp, 18G X 1\" MISC; Use to draw up hormones once weekly  Dispense: 25 each; Refill: 3  - Repeat all labs next visit November 15th (lipid panel, testosterone level, CMP, CBC).     Please call or return to clinic if your symptoms don't go away.    Follow up plan  Please make a clinic appointment for follow up with your primary physician Lisa Hernandez MD in 3 months.      Thank you for coming to [facility] today.  Lab Testing:  **If you had lab testing today and your results are reassuring or normal they will be mailed to you or sent through [employer] within 7 days.   **If the lab tests need quick action we will call you with the results.  The phone number we will call with results is # [phone] (home) . If this is not the best number please call our clinic and change the number.  Medication Refills:  If you need any refills please call your pharmacy and they will contact us.   If you need to  your refill at a new pharmacy, please contact the new pharmacy directly. The new pharmacy will help you get your medications transferred faster.   Scheduling:  If you have any concerns about today's visit or wish to schedule another appointment please call our office during normal business hours [phone] (8-5:00 M-F)  If a referral was made to a [facility] and you don't get a call from [place] " scheduling please call [phone].  If a Mammogram was ordered for you at [facility] call [phone] to schedule or change your appointment.  If you had an XRay/CT/Ultrasound/MRI ordered the number is [phone] to schedule or change your radiology appointment.   Medical Concerns:  If you have urgent medical concerns please call [phone] at any time of the day.    Lisa Hernandez MD            Follow-ups after your visit        Who to contact     Please call your clinic at [phone] to:    Ask questions about your health    Make or cancel appointments    Discuss your medicines    Learn about your test results    Speak to your doctor            Additional Information About Your Visit        Easy[employer] Information     [employer] gives you secure access to your electronic health record. If you see a primary care provider, you can also send messages to your care team and make appointments. If you have questions, please call your primary care clinic.  If you do not have a primary care provider, please call [phone] and they will assist you.      [employer] is an electronic gateway that provides easy, online access to your medical records. With [employer], you can request a clinic appointment, read your test results, renew a prescription or communicate with your care team.     To access your existing account, please contact your [facility] or call [phone] for assistance.        Care EveryWhere ID     This is your Care EveryWhere ID. This could be used by other organizations to access your [facility] medical records  [id number]        Your Vitals Were     Pulse Temperature Respirations Pulse Oximetry BMI (Body Mass Index)       82 97.6  F (36.4  C) (Oral) 16 99% 27.73 kg/m2        Blood Pressure from Last 3 Encounters:   08/14/18 108/72   05/21/18 95/61   04/24/18 115/76    Weight from Last 3 Encounters:   08/14/18 151 lb 9.6 oz (68.8 kg)   05/21/18 151 lb 3.2 oz (68.6 kg)  "(81 %)*   04/24/18 150 lb 12.8 oz (68.4 kg) (81 %)*     * Growth percentiles are based on [facility] 2-20 Years data.              Today, you had the following     No orders found for display         Today's Medication Changes          These changes are accurate as of 8/14/18  1:43 PM.  If you have any questions, ask your nurse or doctor.               Start taking these medicines.        Dose/Directions    needle (disp) 18G X 1\" Misc   Used for:  Gender dysphoria in adolescent and adult   Started by:  Lisa Hernandez MD        Use to draw up hormones once weekly   Quantity:  25 each   Refills:  3            Where to get your medicines      These medications were sent to [facility] [address] [place] [facility] [place] [address]   [address] [facility] [address]     Phone:  [phone]     needle (disp) 18G X 1\" Misc    syringe (disposable) 1 ML Misc                Primary Care Provider Office Phone # Fax #    Lisa Hernandez [phone]       [address] [address]        Equal Access to Services     ROCÍO BARCENAS : Hadii hunter ku hadasho Soomaali, waaxda luqadaha, qaybta kaalmada adedoroteoyablaise, dany robbins. So [facility] [phone].    ATENCIÓN: Si habla español, tiene a brothers disposición servicios gratuitos de asistencia lingüística. Hayder al [phone].    We comply with applicable federal civil rights laws and [place] laws. We do not discriminate on the basis of race, color, national origin, age, disability, sex, sexual orientation, or gender identity.            Thank you!     Thank you for choosing [facility]  for your care. Our goal is always to provide you with excellent care. Hearing back from our patients is one way we can continue to improve our services. Please take a few minutes to complete the written survey that you may receive in the mail after your visit with us. Thank you!             Your Updated Medication " "List - Protect others around you: Learn how to safely use, store and throw away your medicines at www.disposemymeds.org.          This list is accurate as of 8/14/18  1:43 PM.  Always use your most recent med list.                   Brand Name Dispense Instructions for use Diagnosis    escitalopram 20 MG tablet    LEXAPRO    30 tablet    Take 1/2 tablet (10 mg) for 1 week, then increase to 1 tablet orally daily    Adjustment disorder with depressed mood       needle (disp) 18G X 1\" Misc     25 each    Use to draw up hormones once weekly    Gender dysphoria in adolescent and adult       Needle (Disp) 25G X 1-1/2\" Misc     25 each    Use once weekly for administering hormone IM    Gender dysphoria in adolescent and adult       syringe (disposable) 1 ML Misc     25 each    Use to draw up hormones once weekly    Gender dysphoria in adult       testosterone cypionate 200 MG/ML injection    DEPOTESTOTERONE    2 mL    Inject 0.3 mLs (60 mg) into the muscle once a week    Gender dysphoria in adolescent and adult         "

## 2018-08-14 NOTE — PROGRESS NOTES
"       HPI       Varsha Meredith is a 18 year old  who presents for   Chief Complaint   Patient presents with     Follow Up For     HRT- abdominal cramps off and on for 2 weeks. Has not had menses for over 1 year. No spotting per pt. Took ibuprofen which helped a little. Some bloating.     Refill Request     syringes and 18G needles       Groin/pelvic pain     Onset: 2 days (2 weeks ago)    Description:   Character: Cramping  Location: pelvic region  Radiation: None    Intensity: 7/10    Progression of Symptoms:  Resolved now    Accompanying Signs & Symptoms:  Fever/Chills?: No   Gas/Bloating: No   Dysuria:No   Hematuria: No   Nausea:  YES, only during the first day  Vomiting: No   Diarrhea:No   Constipation: NO       History:           Trauma: No   Previous similar pain:  YES, previously when used to have periods (LMP > 1 year ago)   Previous tests done: none    Precipitating factors:   Does the pain change with:     Food?: no     BM?: no     Urination:no     What makes it better?: Unsure - now resolved      Therapies Tried and outcome: ibuprofen, Details:improved     LMP:  > 1 year ago.          +++++++      Problem, Medication and Allergy Lists were      Patient Active Problem List    Diagnosis Date Noted     Gender dysphoria in adolescent and adult 10/19/2016     Priority: Medium     Anxiety 10/19/2016     Priority: Medium   ,     Current Outpatient Prescriptions   Medication Sig Dispense Refill     escitalopram (LEXAPRO) 20 MG tablet Take 1/2 tablet (10 mg) for 1 week, then increase to 1 tablet orally daily 30 tablet 3     Needle, Disp, 25G X 1-1/2\" MISC Use once weekly for administering hormone IM 25 each 3     syringe, disposable, 1 ML MISC Use to draw up hormones once weekly 25 each 3     testosterone cypionate (DEPOTESTOTERONE) 200 MG/ML injection Inject 0.3 mLs (60 mg) into the muscle once a week 2 mL 3   ,   No Known Allergies.    Patient is   an established patient of this clinic.  Past Medical " History:   Diagnosis Date     Depressive disorder      Family History   Problem Relation Age of Onset     Leukemia Paternal Grandmother      Hypertension Paternal Grandfather      Skin Cancer Other      Social History     Social History     Marital status: Single     Spouse name: N/A     Number of children: N/A     Years of education: N/A     Social History Main Topics     Smoking status: Never Smoker     Smokeless tobacco: Never Used     Alcohol use No     Drug use: No     Sexual activity: No     Other Topics Concern     None     Social History Narrative   .         Review of Systems:   Review of Systems  Skin: negative except as above  Respiratory: negative except as above  Cardiovascular: negative except as above  Gastrointestinal: negative except as above  Genitourinary: negative except as above  Musculoskeletal: negative except as above  Neurologic: negative except as above  Psychiatric: negative except as above  Hematologic/Lymphatic/Immunologic: negative except as above  Endocrine: negative except as above         Physical Exam:     Vitals:    08/14/18 1320   BP: 108/72   Pulse: 82   Resp: 16   Temp: 97.6  F (36.4  C)   TempSrc: Oral   SpO2: 99%   Weight: 151 lb 9.6 oz (68.8 kg)     Body mass index is 27.73 kg/(m^2).  Vitals were reviewed and were normal     Physical Exam  Constitutional: Oriented to person, place, and time. Appears well-developed and well-nourished. small amount of facial hair noted.   Cardiovascular: Normal rate, regular rhythm, normal heart sounds and intact distal pulses.    Pulmonary/Chest: Effort normal and breath sounds normal. No respiratory distress. No wheezes.   Abdominal: Soft. Exhibits no distension. Mild vague TTP in right mid abdomen, no guarding/rebound tenderness.   Musculoskeletal: Normal range of motion.   Neurological: Alert and oriented to person, place, and time.   Skin: Skin is warm and dry.   Psychiatric: Has a normal mood and affect. Behavior is normal.       Results:  "  None    Assessment and Plan        1. Gender dysphoria in adult  Refills ordered today for syringes and needles to draw. Has been undergoing lab repeats every 3 months. Currently therapeutic with testosterone and would repeat 6 months from previous and then possibly a year after if no changes are made in dosing. No obvious side effects reported by patient.   - syringe, disposable, 1 ML MISC; Use to draw up hormones once weekly  Dispense: 25 each; Refill: 3  - needle, disp, 18G X 1\" MISC; Use to draw up hormones once weekly  Dispense: 25 each; Refill: 3  - Repeat all labs next visit November 15th (lipid panel, testosterone level, CMP, CBC).     2. Pelvic pain now resolved  Patient was concerned about previous pain however did not have any evidence of menstrual flow with spotting. At this time would monitor for next episode as there is no clear indication to workup. Exam was benign this visit.   - Would recommend urinalysis vs. Pelvic US if symptoms return.     Please call or return to clinic if your symptoms don't go away.    Follow up plan  Please make a clinic appointment for follow up with your primary physician Lisa Hernandez MD in 3 months.      Thank you for coming to [facility] today.       There are no discontinued medications.    Options for treatment and follow-up care were reviewed with the patient. Varsha Meredith  engaged in the decision making process and verbalized understanding of the options discussed and agreed with the final plan.    Lisa Hernandez MD  "

## 2018-08-14 NOTE — PATIENT INSTRUCTIONS
"Here is the plan from today's visit    1. Gender dysphoria in adult  - syringe, disposable, 1 ML MISC; Use to draw up hormones once weekly  Dispense: 25 each; Refill: 3  - needle, disp, 18G X 1\" MISC; Use to draw up hormones once weekly  Dispense: 25 each; Refill: 3  - Repeat all labs next visit November 15th (lipid panel, testosterone level, CMP, CBC).     2. Pelvic pain now resolved  Patient was concerned about previous pain however did not have any evidence of menstrual flow with spotting. At this time would monitor for next episode as there is no clear indication to workup. Exam was benign this visit.   - Would recommend urinalysis vs. Pelvic US if symptoms return.     Please call or return to clinic if your symptoms don't go away.    Follow up plan  Please make a clinic appointment for follow up with your primary physician Lisa Hernandez MD in 3 months.      Thank you for coming to [facility] today.  Lab Testing:  **If you had lab testing today and your results are reassuring or normal they will be mailed to you or sent through [employer] within 7 days.   **If the lab tests need quick action we will call you with the results.  The phone number we will call with results is # [phone] (home) . If this is not the best number please call our clinic and change the number.  Medication Refills:  If you need any refills please call your pharmacy and they will contact us.   If you need to  your refill at a new pharmacy, please contact the new pharmacy directly. The new pharmacy will help you get your medications transferred faster.   Scheduling:  If you have any concerns about today's visit or wish to schedule another appointment please call our office during normal business hours [phone] (8-5:00 M-F)  If a referral was made to a [facility] and you don't get a call from central scheduling please call [phone].  If a Mammogram was ordered for you at [facility] call " [phone] to schedule or change your appointment.  If you had an XRay/CT/Ultrasound/MRI ordered the number is [phone] to schedule or change your radiology appointment.   Medical Concerns:  If you have urgent medical concerns please call [phone] at any time of the day.    Lisa Hernandez MD

## 2018-08-14 NOTE — PROGRESS NOTES
Preceptor Attestation:   Patient seen, evaluated and discussed with the resident. I have verified the content of the note, which accurately reflects my assessment of the patient and the plan of care.   Supervising Physician:  Natalya Araiza MD

## 2018-09-10 ENCOUNTER — TELEPHONE (OUTPATIENT)
Dept: FAMILY MEDICINE | Facility: CLINIC | Age: 18
End: 2018-09-10

## 2018-09-10 NOTE — TELEPHONE ENCOUNTER
Requesting a prior authorization for Testosterone. The insurance information is listed below. Please call the insurance and initiate the PA process or if you need information on a covered alternative.      Plan ID: [id number]      N:[id number]    [id number]    Group:      Telephone: 1 [phone]

## 2018-09-11 NOTE — TELEPHONE ENCOUNTER
PA Initiation    Medication: testosterone cyp 200 MG/ML inj  Insurance Company: [employer] - Phone [phone] Fax [phone]  Pharmacy Filling the Rx: [id number] [id number] [place] [facility] [place], [place] - [address] [place] [address]  Filling Pharmacy Phone: [phone]  Filling Pharmacy Fax: [phone]  Start Date: 9/11/2018

## 2018-09-11 NOTE — TELEPHONE ENCOUNTER
Prior Authorization Approval    Authorization Effective Date: 8/12/2018  Authorization Expiration Date: 9/10/2021  Medication: testosterone cyp 200 MG/ML inj - P/A APPROVED  Approved Dose/Quantity: 2ml  Reference #: [id number] [id number]   Insurance Company: [employer] - Phone [phone] Fax [phone]  Expected CoPay:       CoPay Card Available:      Foundation Assistance Needed:    Which Pharmacy is filling the prescription (Not needed for infusion/clinic administered): [facility]  Pharmacy Notified: Yes   Patient Notified:

## 2018-09-11 NOTE — TELEPHONE ENCOUNTER
Central Prior Authorization Team   Phone: [phone]    Submitted P/A demographics to insurance, awaiting question set.

## 2018-10-12 ENCOUNTER — MYC REFILL (OUTPATIENT)
Dept: FAMILY MEDICINE | Facility: CLINIC | Age: 18
End: 2018-10-12

## 2018-10-12 DIAGNOSIS — F64.0 GENDER DYSPHORIA IN ADOLESCENT AND ADULT: ICD-10-CM

## 2018-10-15 NOTE — TELEPHONE ENCOUNTER
Message from MyChart:  Original authorizing provider: MD Paxton Schaefer would like a refill of the following medications:  testosterone cypionate (DEPOTESTOTERONE) 200 MG/ML injection [Natalya Araiza MD]    Preferred pharmacy: [place], [place] - [address]    Comment:

## 2018-10-15 NOTE — TELEPHONE ENCOUNTER
Please refill prescription for testosterone per protocol. I have reviewed the dose and administration as well as the refill quantity and agree with all.     Thank you,    MD [facility]  PGY-2  Pager #: [phone]

## 2018-10-16 RX ORDER — TESTOSTERONE CYPIONATE 200 MG/ML
60 INJECTION, SOLUTION INTRAMUSCULAR WEEKLY
Qty: 2 ML | Refills: 3 | Status: SHIPPED | OUTPATIENT
Start: 2018-10-16 | End: 2018-11-15

## 2018-11-15 ENCOUNTER — OFFICE VISIT (OUTPATIENT)
Dept: FAMILY MEDICINE | Facility: CLINIC | Age: 18
End: 2018-11-15
Payer: COMMERCIAL

## 2018-11-15 VITALS
OXYGEN SATURATION: 96 % | RESPIRATION RATE: 16 BRPM | TEMPERATURE: 98.1 F | WEIGHT: 155.6 LBS | SYSTOLIC BLOOD PRESSURE: 115 MMHG | BODY MASS INDEX: 28.46 KG/M2 | HEART RATE: 82 BPM | DIASTOLIC BLOOD PRESSURE: 78 MMHG

## 2018-11-15 DIAGNOSIS — F64.0 GENDER DYSPHORIA IN ADOLESCENT AND ADULT: Primary | ICD-10-CM

## 2018-11-15 LAB
% GRANULOCYTES: 58.3 %G (ref 40–75)
GLUCOSE SERPL-MCNC: 76 MG'DL (ref 70–99)
GRANULOCYTES #: 4.7 K/UL (ref 1.6–8.3)
HCT VFR BLD AUTO: 51.5 % (ref 35–47)
HEMOGLOBIN: 16.1 G/DL (ref 11.7–15.7)
LYMPHOCYTES # BLD AUTO: 2.8 K/UL (ref 0.8–5.3)
LYMPHOCYTES NFR BLD AUTO: 34.8 %L (ref 20–48)
MCH RBC QN AUTO: 31 PG (ref 26.5–35)
MCHC RBC AUTO-ENTMCNC: 31.3 G/DL (ref 32–36)
MCV RBC AUTO: 99.2 FL (ref 78–100)
MID #: 0.6 K/UL (ref 0–2.2)
MID %: 6.9 %M (ref 0–20)
PLATELET # BLD AUTO: 239 K/UL (ref 150–450)
RBC # BLD AUTO: 5.19 M/UL (ref 3.8–5.2)
WBC # BLD AUTO: 8 K/UL (ref 4–11)

## 2018-11-15 RX ORDER — TESTOSTERONE CYPIONATE 200 MG/ML
60 INJECTION, SOLUTION INTRAMUSCULAR WEEKLY
Qty: 1.3 ML | Refills: 3 | Status: SHIPPED | OUTPATIENT
Start: 2018-11-15 | End: 2018-12-19

## 2018-11-15 NOTE — PATIENT INSTRUCTIONS
Some online resources for transgender health    [place] [facility]   Home to the [facility] at [address], [phone]. Also has support groups.  http://www.[place]transhealth.org/    [facility] Transgender [facility]  Increasing access to comprehensive, effective, and affirming healthcare services for trans and gender-variant communities.  http://www.transhealth.[facility].edu/trans?page=mary-00-05    [facility]   Community-based non-profit committed to advancing the health & wellness of LGBTQ communities through research, education and advocacy. http://www.[facility].org    [facility] Glossary of Transgender Terms  http://www.[employer] [facility].org/site/DocServer/Handout_7-C_Glossary_of_Gender_and_Transgender_Terms__fi.pdf?jcyPT=9660    Safe, gender-neutral public restrooms in [facility]   http://www.[facility].org//index.php?option=com_content&task=view&id=12&Itemid    Trans Youth Support Network  For people 26 and under who identify as a trans or gender non-conforming person and want to be a part of an activist organization. Offers peer support, education and community building opportunities.   http://www.transyouthsupportnetwork.org/    Reclaim:  RECLAIM offers mental and integrative health services for LGBTQ youth and their families.  http://www.reclaim-lgbtyouth.org/    Gender Spectrum, for Trans Youth  Gender Spectrum provides education, training and support to help create a gender sensitive and inclusive environment for all children and teens. http://www.genderspectrum.org/    [facility] FTM Resource Guide  Information on topics of interest to female-to-male (FTM, F2M) trans men, and their friends and loved ones.  http://ftmguide.org/    Also check out your local [facility] resource center!  LGBTQIA Services at [facility]: http://www.[facility]/lgbtqia/  LGBTQ@[facility]: http://www.[facility]/multiculturallife/lgbtq/  GLBTA Programs  office at [facility]: https://diversity.[facility]/glbta/            Thoughts of self harm?   Trans Lifeline can be reached at [phone].   This service is staffed by trans people 24/7.   For LGBT youth (ages 24 and younger) contemplating suicide, the [employer] Lifeline can be reached at [phone].           MASCULINIZING Medications, Labs and f/u for Transmen (FTM)Updated 6/2016  Drug Dose s/p oophorectomy  Miscellaneous Details   Testosterone cypionate   (Depo Testosterone)  Start: 25-50mg IM qwk  Typical: 50-75mg IM qwk  Max: 100mg IM qwk Typical: 30-60mg IM qwk Needles: 23-25ga 1inch (for thigh) to 1.5inch (for glut), 1cc syringe, 18ga to draw up. Bottle sizes 1ml or 10ml. Allergy? Carrier oil is cottonseed oil   Testosterone enanthate   (Delatestryl) Start: 25-50mg IM qwk  Typical: 60-80mg IM qwk  Max: 100mg IM qwk Typical: 40-80mg IM qwk Allergy? Carrier oil is sesame oil.    Testosterone gel (Androgel 1.62%) Start: 40.25mg daily (2 pumps)  Typical: 40-80mg (2-4pumps)  Max: 6 pumps 1-2 pumps daily  4 hrs to dry, apply to shoulders & upper arms, behind knees thighs, armpits, can txfr to others.   40.25mg testosterone = 2.5g gel= 2 pumps   Testosterone gel  (Testim 1%) Start: 2.5 g daily  Typical: 5mg daily  Max: 10 g daily  4 hrs to dry, apply to shoulders & upper arms, can txfr to others. 50mg testosterone= 5 grams gel. Comes in premeasured tubes.   Testosterone Soln (Axiron)   Start: 60mg daily (2 pumps)  Typical: 60-120mg (2-4p) Max: 120 mg (4 p) 1-2 pumps daily 30mg testosterone = 1.5 ml solution = 1 pump  2 pumps = 1 pump each axilla   Androderm patch  Typical: 5-10m/24hr patch 2.5mg patch Disliked due to very large size, consider switch if possible   Testosterone proprionate 1-2% cream compounded Start: 25mg daily   Typical: 50-75mg daily  Max: 100mg daily 25-50mg daily Compounded.  Recommend [facility], [facility] [address] (monthly cost ~$40) or [facility] [address]. Compound in PLO gel, or  ask for recs if not avail.     LABS Hgb Fasting Glu/A1C Fasting Lipid LFT s Total testosterone   On shots? Draw labs Mid  cycle! (  way btw shots) After=after start or dose change -Baseline  -3 mo after   -6mo after  -then annual   -Baseline  -3 mo after   -6mo after  -then annual -Baseline  -3 mo after   -6mo after  -then annual  Statins prn. -Baseline  -3 mo after   -6mo after  -q1yr Abnl? Hep serology, RUQ US -Baseline  -3 mo after   -6mo after, then annual.  Goal range 300-1000,  push ? to induce changes, drop after 2yrs   -    -0Psychotherapy: not required! REC if: no consolidated gender, not gender dysphoric by criteria, nonconforming & may not need hormones      -Testosterone therapy is CONTRAINDICATED IF unstable psychosis, bipolar, or PREGNANCY. Need excellent contraception if sperm!  -   Gender related effects: stop menses, ?voice, ?facial/body hair, ?muscle mass, redistrib body fat, acne, ?libido, ?fertility, ?clitoris  -   Other effects: ?Weight, emotional lability, ?lipids, vaginal atrophy, ? BP, ?RBC, ?LFT s transiently, ?WILMER, ?endometrial hyperplasia ayaan  since higher incidence of PCOS in transmen    -surveillance of periods is critical for long term T  -   Testosterone is a controlled substance. Educate about this. Follow for reasonable refills. Paper rx required.    Adapted by Sherlyn Reyes MD from:  Dept Public Health, [facility] Trans* Center of Excellence, Critical access hospital Guidelines, [facility] SOC-7. Updated 6/2016 Trans Guidelines [facility]         Effects and Expected Time Course of Masculinizing  Hormones    Effect Expected Onset Expected Maximum Effect   Skin oiliness/Acne 1-6 months 1-2 years   Facial/body hair growth 3-6 months 3-5 years    Scalp hair loss >12 months+ Variable   Increased muscle mass/strength 6-12 months 2-5 years   Body fat redistribution 3-6 months 2-5 years   Cessation of menses 2-6 months n/a   Clitoral enlargement 3-6 months 1-2 years   Vaginal atrophy 3-6 months 1-2 years    Deepened voice 3-12 months 1-2 years         Here is the plan from today's visit    1. Gender dysphoria in adolescent and adult    - Testosterone Total  - Hepatic Panel  - CBC with Diff Plt  - Lipid Cascade  - Glucose  - testosterone cypionate (DEPOTESTOSTERONE) 200 MG/ML injection; Inject 0.3 mLs (60 mg) into the muscle once a week  Dispense: 1.3 mL; Refill: 3      Please call or return to clinic if your symptoms don't go away.    Follow up plan  Please make a clinic appointment for follow up with me (TISHA SERRANO) 1-2 months to re-evaluate dosing based on today's labs  Thank you for coming to [facility] today.  Lab Testing:  **If you had lab testing today and your results are reassuring or normal they will be mailed to you or sent through [place] within 7 days.   **If the lab tests need quick action we will call you with the results.  The phone number we will call with results is # [phone] (home) . If this is not the best number please call our clinic and change the number.  Medication Refills:  If you need any refills please call your pharmacy and they will contact us.   If you need to  your refill at a new pharmacy, please contact the new pharmacy directly. The new pharmacy will help you get your medications transferred faster.   Scheduling:  If you have any concerns about today's visit or wish to schedule another appointment please call our office during normal business hours [phone] (8-5:00 M-F)  If a referral was made to a [facility] and you don't get a call from central scheduling please call [phone].  If a Mammogram was ordered for you at [facility] call [phone] to schedule or change your appointment.  If you had an XRay/CT/Ultrasound/MRI ordered the number is [phone] to schedule or change your radiology appointment.   Medical Concerns:  If you have urgent medical concerns please call [phone] at any time of the  day.    Lloyd Capone MD

## 2018-11-15 NOTE — PROGRESS NOTES
"       JIGAR Matta is a 18 year old individual that uses pronouns He/Him/His/Himself that presents today for follow up of:  masculinizing hormone therapy. Gender identity: Male    Any special concerns today?  concerns about progress of hormones and pain due to injections    On hormones?  YES +++ Shot day of the week? Wednesday      Due for labs?  yes     +++ Refills of meds needed?  Yes  ---    History reviewed. No pertinent surgical history.    Patient Active Problem List   Diagnosis     Gender dysphoria in adolescent and adult     Anxiety       Current Outpatient Prescriptions   Medication Sig Dispense Refill     escitalopram (LEXAPRO) 20 MG tablet Take 1/2 tablet (10 mg) for 1 week, then increase to 1 tablet orally daily 30 tablet 3     needle, disp, 18G X 1\" MISC Use to draw up hormones once weekly 25 each 3     Needle, Disp, 25G X 1-1/2\" MISC Use once weekly for administering hormone IM 25 each 3     syringe, disposable, 1 ML MISC Use to draw up hormones once weekly 25 each 3     testosterone cypionate (DEPOTESTOSTERONE) 200 MG/ML injection Inject 0.3 mLs (60 mg) into the muscle once a week 2 mL 3       History   Smoking Status     Never Smoker   Smokeless Tobacco     Never Used        No Known Allergies    Problem, Medication and Allergy Lists were reviewed and are current..         Review of Systems:      General    Fat redistribution: YES but not in jawline    Weight change: YES, gain 4 lbs last 7 months, 20 lbs last 2 years HEENT    Voice change: YES, but not at goal     Cardiovascular (CV)    Chest Pains: no    Shortness of breath: no Chest    Decreased exercise tolerance:  no    Breast changes/development: no     Gastrointestinal (GI)    Abdominal pain: no    Change in appetite: no Skin    Acne or oily skin: YES    Change in hair: no     Genitourinary ()    Abnormal vaginal bleeding: no     Decreased spontaneous erections: not applicable    Change in libido: YES, neutral thing    New sexual partners: " "no Musculoskeletal    Leg pain or swelling: no     Psychiatric (Psych)    Depression: no    Anxiety/Panic: YES, slightly worse    Mood:  \"okay\"                    Physical Exam:     Vitals:    11/15/18 1523   BP: 115/78   Pulse: 82   Resp: 16   Temp: 98.1  F (36.7  C)   TempSrc: Oral   SpO2: 96%   Weight: 155 lb 9.6 oz (70.6 kg)     BMI= Body mass index is 28.46 kg/(m^2).   Wt Readings from Last 10 Encounters:   11/15/18 155 lb 9.6 oz (70.6 kg)   08/14/18 151 lb 9.6 oz (68.8 kg)   05/21/18 151 lb 3.2 oz (68.6 kg) (81 %)*   04/24/18 150 lb 12.8 oz (68.4 kg) (81 %)*   02/21/18 142 lb 9.6 oz (64.7 kg) (73 %)*   10/23/17 132 lb 12.8 oz (60.2 kg) (61 %)*   08/03/17 131 lb 6.4 oz (59.6 kg) (59 %)*   05/31/17 134 lb 3.2 oz (60.9 kg) (65 %)*   04/25/17 133 lb 12.8 oz (60.7 kg) (64 %)*   03/20/17 133 lb (60.3 kg) (64 %)*     * Growth percentiles are based on [facility] 2-20 Years data.     Appearance: Male appearance and dress    GENERAL:: healthy, alert and no distress  EYES: Eyes grossly normal to inspection, fundi benign and PERRL  NECK: no adenopathy, no asymmetry, no masses,  and thyroid normal to palpation, supple  RESP: lungs clear to auscultation - no rales, no rhonchi, no wheezes  CV: regular rates and rhythm, normal S1 S2, no S3 or S4 and no murmur, no click or rub -  Affect: Appropriate/mood-congruent           Labs:       Assessment and Plan     1. Gender dysphoria in adolescent and adult  Paxton would like to consider increasing testosterone from 60 to 75 or 80mg.  He would prefer to wait until today's lab results come back to see where testosterone levels are.  Will communicate results via mychart and OK with increasing to 75 or 80mg IM weekly per patient preference if testosterone level is < 800.  If change in dosing, return to clinic 1 month after for repeat labs.  Otherwise, follow up in 3-6 months  - Testosterone Total  - Hepatic Panel  - CBC with Diff Plt  - Lipid Cascade  - Glucose  - testosterone cypionate " (DEPOTESTOSTERONE) 200 MG/ML injection; Inject 0.3 mLs (60 mg) into the muscle once a week  Dispense: 1.3 mL; Refill: 3      Contraception:   abstinence  .     Counselled patient about controlled substances:Yes      Follow up:  Follow up in 1 month.  Results by carleen  Questions were elicited and answered.     Lloyd Capone MD

## 2018-11-15 NOTE — PROGRESS NOTES
Preceptor Attestation:   Patient seen, evaluated and discussed with the resident. I have verified the content of the note, which accurately reflects my assessment of the patient and the plan of care.   Supervising Physician:  Micaela Rodriguez MD

## 2018-11-15 NOTE — MR AVS SNAPSHOT
After Visit Summary   11/15/2018    Varsha Meredith    MRN: [id number]           Patient Information     Date Of Birth          2000        Visit Information        Provider Department      11/15/2018 3:40 PM Lloyd Capone MD [facility]        Today's Diagnoses     Gender dysphoria in adolescent and adult    -  1      Care Instructions      Some online resources for transgender health    [place] Transgender Health Coalition   Home to the [facility] at [address] [place] [address], [phone]. Also has support groups.  http://www.[place]transhealth.org/    Center of Excellence for Transgender Health  Increasing access to comprehensive, effective, and affirming healthcare services for trans and gender-variant communities.  http://www.transhealth.[facility].edu/trans?page=mary-00-05    [facility]   Community-based non-profit committed to advancing the health & wellness of LGBTQ communities through research, education and advocacy. http://www.[employer].org    [facility] Glossary of Transgender Terms  http://www.eToro.org/site/DocServer/Handout_7-C_Glossary_of_Gender_and_Transgender_Terms__fi.pdf?hpjKS=6689    Safe, gender-neutral public restrooms in the [facility]   http://www.[facility].org//index.php?option=com_content&task=view&id=12&Itemid    Trans Youth Support Network  For people 26 and under who identify as a trans or gender non-conforming person and want to be a part of an activist organization. Offers peer support, education and community building opportunities.   http://www.transyouthsupportnetwork.org/    Reclaim:  RECLAIM offers mental and integrative health services for LGBTQ youth and their families.  http://www.reclaim-lgbtyouth.org/    Gender Spectrum, for Trans Youth  Gender Spectrum provides education, training and support to help create a gender sensitive and inclusive environment for all children and teens.  http://www.genderspectrum.org/    [facility] FTM Resource Guide  Information on topics of interest to female-to-male (FTM, F2M) trans men, and their friends and loved ones.  http://ftmguide.org/    Also check out your local [employer] resource center!  LGBTQIA Services at [facility]: http://www.[facility]/lgbtqia/  LGBTQ@[facility] at [facility]: http://www.[facility]/multiculturallife/lgbtq/  GLBTA Programs office at  of : https://diversity.[facility]/glbta/            Thoughts of self harm?   Trans Lifeline can be reached at [phone].   This service is staffed by trans people 24/7.   For LGBT youth (ages 24 and younger) contemplating suicide, the [employer] Lifeline can be reached at 1-989-4816.           MASCULINIZING Medications, Labs and f/u for Transmen (FTM)Updated 6/2016  Drug Dose s/p oophorectomy  Miscellaneous Details   Testosterone cypionate   (Depo Testosterone)  Start: 25-50mg IM qwk  Typical: 50-75mg IM qwk  Max: 100mg IM qwk Typical: 30-60mg IM qwk Needles: 23-25ga 1inch (for thigh) to 1.5inch (for glut), 1cc syringe, 18ga to draw up. Bottle sizes 1ml or 10ml. Allergy? Carrier oil is cottonseed oil   Testosterone enanthate   (Delatestryl) Start: 25-50mg IM qwk  Typical: 60-80mg IM qwk  Max: 100mg IM qwk Typical: 40-80mg IM qwk Allergy? Carrier oil is sesame oil.    Testosterone gel (Androgel 1.62%) Start: 40.25mg daily (2 pumps)  Typical: 40-80mg (2-4pumps)  Max: 6 pumps 1-2 pumps daily  4 hrs to dry, apply to shoulders & upper arms, behind knees thighs, armpits, can txfr to others.   40.25mg testosterone = 2.5g gel= 2 pumps   Testosterone gel  (Testim 1%) Start: 2.5 g daily  Typical: 5mg daily  Max: 10 g daily  4 hrs to dry, apply to shoulders & upper arms, can txfr to others. 50mg testosterone= 5 grams gel. Comes in premeasured tubes.   Testosterone Soln (Axiron)   Start: 60mg daily (2 pumps)  Typical: 60-120mg (2-4p) Max: 120 mg (4 p) 1-2 pumps daily 30mg testosterone = 1.5 ml solution = 1  pump  2 pumps = 1 pump each axilla   Androderm patch  Typical: 5-10m/24hr patch 2.5mg patch Disliked due to very large size, consider switch if possible   Testosterone proprionate 1-2% cream compounded Start: 25mg daily   Typical: 50-75mg daily  Max: 100mg daily 25-50mg daily Compounded.  Recommend [facility],  on [employer] (monthly cost ~$40) or [facility]. Compound in PLO gel, or ask for recs if not avail.     LABS Hgb Fasting Glu/A1C Fasting Lipid LFT s Total testosterone   On shots? Draw labs Mid  cycle! (  way btw shots) After=after start or dose change -Baseline  -3 mo after   -6mo after  -then annual   -Baseline  -3 mo after   -6mo after  -then annual -Baseline  -3 mo after   -6mo after  -then annual  Statins prn. -Baseline  -3 mo after   -6mo after  -q1yr Abnl? Hep serology, RUQ US -Baseline  -3 mo after   -6mo after, then annual.  Goal range 300-1000,  push ? to induce changes, drop after 2yrs   -    -0Psychotherapy: not required! REC if: no consolidated gender, not gender dysphoric by criteria, nonconforming & may not need hormones      -Testosterone therapy is CONTRAINDICATED IF unstable psychosis, bipolar, or PREGNANCY. Need excellent contraception if sperm!  -   Gender related effects: stop menses, ?voice, ?facial/body hair, ?muscle mass, redistrib body fat, acne, ?libido, ?fertility, ?clitoris  -   Other effects: ?Weight, emotional lability, ?lipids, vaginal atrophy, ? BP, ?RBC, ?LFT s transiently, ?WILMER, ?endometrial hyperplasia ayaan  since higher incidence of PCOS in transmen    -surveillance of periods is critical for long term T  -   Testosterone is a controlled substance. Educate about this. Follow for reasonable refills. Paper rx required.    Adapted by Sherlyn Reyes MD from: [place] [facility], [facility] Trans* Center of Excellence, formerly [facility] Guidelines, WPATH SOC-7. Updated 6/2016 Trans Guidelines [facility]         Effects and Expected Time Course of  Masculinizing  Hormones    Effect Expected Onset Expected Maximum Effect   Skin oiliness/Acne 1-6 months 1-2 years   Facial/body hair growth 3-6 months 3-5 years    Scalp hair loss >12 months+ Variable   Increased muscle mass/strength 6-12 months 2-5 years   Body fat redistribution 3-6 months 2-5 years   Cessation of menses 2-6 months n/a   Clitoral enlargement 3-6 months 1-2 years   Vaginal atrophy 3-6 months 1-2 years   Deepened voice 3-12 months 1-2 years         Here is the plan from today's visit    1. Gender dysphoria in adolescent and adult    - Testosterone Total  - Hepatic Panel  - CBC with Diff Plt  - Lipid Cascade  - Glucose  - testosterone cypionate (DEPOTESTOSTERONE) 200 MG/ML injection; Inject 0.3 mLs (60 mg) into the muscle once a week  Dispense: 1.3 mL; Refill: 3      Please call or return to clinic if your symptoms don't go away.    Follow up plan  Please make a clinic appointment for follow up with me (TISHA SERRANO) 1-2 months to re-evaluate dosing based on today's labs  Thank you for coming to [facility] today.  Lab Testing:  **If you had lab testing today and your results are reassuring or normal they will be mailed to you or sent through [employer] within 7 days.   **If the lab tests need quick action we will call you with the results.  The phone number we will call with results is # [phone] (home) . If this is not the best number please call our clinic and change the number.  Medication Refills:  If you need any refills please call your pharmacy and they will contact us.   If you need to  your refill at a new pharmacy, please contact the new pharmacy directly. The new pharmacy will help you get your medications transferred faster.   Scheduling:  If you have any concerns about today's visit or wish to schedule another appointment please call our office during normal business hours [phone] (8-5:00 M-F)  If a referral was made to a [facility]  and you don't get a call from central scheduling please call [phone].  If a Mammogram was ordered for you at [facility] call [phone] to schedule or change your appointment.  If you had an XRay/CT/Ultrasound/MRI ordered the number is [phone] to schedule or change your radiology appointment.   Medical Concerns:  If you have urgent medical concerns please call [phone] at any time of the day.    Lloyd Capone MD            Follow-ups after your visit        Who to contact     Please call your clinic at [phone] to:    Ask questions about your health    Make or cancel appointments    Discuss your medicines    Learn about your test results    Speak to your doctor            Additional Information About Your Visit        Physician Software SystemsharFace-Me Information     [employer] gives you secure access to your electronic health record. If you see a primary care provider, you can also send messages to your care team and make appointments. If you have questions, please call your primary care clinic.  If you do not have a primary care provider, please call [phone] and they will assist you.      Curverider is an electronic gateway that provides easy, online access to your medical records. With Curverider, you can request a clinic appointment, read your test results, renew a prescription or communicate with your care team.     To access your existing account, please contact your [facility] or call [phone] for assistance.        Care EveryWhere ID     This is your Care EveryWhere ID. This could be used by other organizations to access your [facility] medical records  [id number]        Your Vitals Were     Pulse Temperature Respirations Pulse Oximetry BMI (Body Mass Index)       82 98.1  F (36.7  C) (Oral) 16 96% 28.46 kg/m2        Blood Pressure from Last 3 Encounters:   11/15/18 115/78   08/14/18 108/72   05/21/18 95/61    Weight from Last 3 Encounters:   11/15/18 155 lb 9.6 oz (70.6  "kg)   08/14/18 151 lb 9.6 oz (68.8 kg)   05/21/18 151 lb 3.2 oz (68.6 kg) (81 %)*     * Growth percentiles are based on CDC 2-20 Years data.              We Performed the Following     CBC with Diff Plt     Glucose     Hepatic Panel     Lipid Cascade     Testosterone Total        Primary Care Provider Office Phone # Fax #    Lisa Hernandez [phone]       [address] [address]        Equal Access to Services     CARRIE BARCENAS : Hadii aad ku hadasho Soomaali, waaxda luqadaha, qaybta kaalmada adeegyada, waxay idiin hayaan adeeg javonarajose robbins. So [facility] [phone].    ATENCIÓN: Si habla español, tiene a brothers disposición servicios gratuitos de asistencia lingüística. [facility] [phone].    We comply with applicable federal civil rights laws and [place] laws. We do not discriminate on the basis of race, color, national origin, age, disability, sex, sexual orientation, or gender identity.            Thank you!     Thank you for choosing [facility]  for your care. Our goal is always to provide you with excellent care. Hearing back from our patients is one way we can continue to improve our services. Please take a few minutes to complete the written survey that you may receive in the mail after your visit with us. Thank you!             Your Updated Medication List - Protect others around you: Learn how to safely use, store and throw away your medicines at www.disposemymeds.org.          This list is accurate as of 11/15/18  4:27 PM.  Always use your most recent med list.                   Brand Name Dispense Instructions for use Diagnosis    escitalopram 20 MG tablet    LEXAPRO    30 tablet    Take 1/2 tablet (10 mg) for 1 week, then increase to 1 tablet orally daily    Adjustment disorder with depressed mood       needle (disp) 18G X 1\" Misc     25 each    Use to draw up hormones once weekly    Gender dysphoria in adolescent and adult       " "Needle (Disp) 25G X 1-1/2\" Misc     25 each    Use once weekly for administering hormone IM    Gender dysphoria in adolescent and adult       syringe (disposable) 1 ML Misc     25 each    Use to draw up hormones once weekly    Gender dysphoria in adult       testosterone cypionate 200 MG/ML injection    DEPOTESTOSTERONE    2 mL    Inject 0.3 mLs (60 mg) into the muscle once a week    Gender dysphoria in adolescent and adult         "

## 2018-11-16 LAB
ALBUMIN SERPL-MCNC: 4.8 MG/DL (ref 3.8–5)
ALP SERPL-CCNC: 52.4 U/L (ref 31.7–110.5)
ALT SERPL-CCNC: 9.5 U/L (ref 0–45)
AST SERPL-CCNC: 13 U/L (ref 0–45)
BILIRUB SERPL-MCNC: 0.5 MG/DL (ref 0.2–1.3)
BILIRUBIN DIRECT: 0.2 MG/DL (ref 0.1–0.3)
CHOLEST SERPL-MCNC: 152.6 MG/DL (ref 0–200)
CHOLEST/HDLC SERPL: 3.7 {RATIO} (ref 0–5)
HDLC SERPL-MCNC: 40.8 MG/DL
LDLC SERPL CALC-MCNC: 83 MG/DL (ref 0–129)
PROT SERPL-MCNC: 7.4 G/DL (ref 6.8–8.8)
TRIGL SERPL-MCNC: 141.9 MG/DL (ref 0–150)
VLDL CHOLESTEROL: 28.4 MG/DL (ref 7–32)

## 2018-11-20 LAB — TESTOST SERPL-MCNC: 751 NG/DL (ref 8–60)

## 2018-12-19 ENCOUNTER — MYC REFILL (OUTPATIENT)
Dept: FAMILY MEDICINE | Facility: CLINIC | Age: 18
End: 2018-12-19

## 2018-12-19 DIAGNOSIS — F64.0 GENDER DYSPHORIA IN ADOLESCENT AND ADULT: ICD-10-CM

## 2018-12-20 RX ORDER — TESTOSTERONE CYPIONATE 200 MG/ML
60 INJECTION, SOLUTION INTRAMUSCULAR WEEKLY
Qty: 1.3 ML | Refills: 3 | Status: CANCELLED | OUTPATIENT
Start: 2018-12-20

## 2018-12-20 NOTE — TELEPHONE ENCOUNTER
There was duplicate requests for this, and providers responses varied. RN printed, had preceptor sign and walked to pharmacy. Please clarify if patient needs to follow up, other encounter stated to follow up.  Meghan Martin RN

## 2018-12-20 NOTE — TELEPHONE ENCOUNTER

## 2018-12-20 NOTE — TELEPHONE ENCOUNTER
Ok to refill per protocol. Medication reviewed.     Thank you,    Lisa Hernandez MD  [facility]  PGY-2  Pager #: [phone]

## 2018-12-21 NOTE — TELEPHONE ENCOUNTER
RN was approached by the pharmacy because when it was reordered from the previous script, the dispense amount was 1.3 mL and it only comes in 1 mL vials. Do you want 2 mL dispense amount instead so that patient has enough?   Please update and pend for RN to print and have preceptor sign.  Meghan Martin RN

## 2018-12-24 RX ORDER — TESTOSTERONE CYPIONATE 200 MG/ML
60 INJECTION, SOLUTION INTRAMUSCULAR WEEKLY
Qty: 2 ML | Refills: 3 | Status: SHIPPED | OUTPATIENT
Start: 2018-12-24 | End: 2018-12-27

## 2018-12-24 NOTE — TELEPHONE ENCOUNTER
Routing to PCP to ensure that it is still okay to give 3 refills with the 2mL dispense amount. RN can write a new order if it is in writing that PCP approves.  Meghan Martin RN

## 2018-12-24 NOTE — TELEPHONE ENCOUNTER
"Per PCP, \"Yes it is ok to give 3 refills as the recent labs looked ok.     Patient can see me after 3 months.\"    RN re-ordered the testosterone with the 2 mL vials and 3 refills per written order from provider. Printed and placed in lockbox for preceptor to sign and for delivery to [facility] pharmacy on Wednesday when open.  RN destroyed other script due to needing to order it with preceptor name. New one ordered, signed by preceptor and walked to pharmacy.  Meghan Martin RN   "

## 2018-12-27 RX ORDER — TESTOSTERONE CYPIONATE 200 MG/ML
60 INJECTION, SOLUTION INTRAMUSCULAR WEEKLY
Qty: 2 ML | Refills: 3 | Status: SHIPPED | OUTPATIENT
Start: 2018-12-27 | End: 2019-03-18

## 2019-02-08 ENCOUNTER — OFFICE VISIT (OUTPATIENT)
Dept: FAMILY MEDICINE | Facility: CLINIC | Age: 19
End: 2019-02-08
Payer: COMMERCIAL

## 2019-02-08 VITALS
BODY MASS INDEX: 26.74 KG/M2 | OXYGEN SATURATION: 99 % | DIASTOLIC BLOOD PRESSURE: 82 MMHG | SYSTOLIC BLOOD PRESSURE: 129 MMHG | WEIGHT: 146.2 LBS | HEART RATE: 97 BPM | RESPIRATION RATE: 18 BRPM

## 2019-02-08 DIAGNOSIS — F64.0 GENDER DYSPHORIA IN ADOLESCENT AND ADULT: Primary | ICD-10-CM

## 2019-02-08 RX ORDER — TESTOSTERONE CYPIONATE 200 MG/ML
60 INJECTION, SOLUTION INTRAMUSCULAR WEEKLY
Qty: 2 ML | Refills: 0 | Status: SHIPPED | OUTPATIENT
Start: 2019-02-08 | End: 2020-03-20

## 2019-02-08 NOTE — PROGRESS NOTES
"          JIGAR Matta is a 18 year old individual that uses pronouns He/Him/His/Himself that presents today for follow up of:  masculinizing hormone therapy. Gender identity: Male    Any special concerns today?    -Top surgery - Planning to approach Dr. Altamirano for bilateral mastectomy.    On hormones?  YES +++ Shot day of the week? Wednesday      Due for labs?  No      +++ Refills of meds needed?  Yes  ---    History reviewed. No pertinent surgical history.    Patient Active Problem List   Diagnosis     Gender dysphoria in adolescent and adult     Anxiety       Current Outpatient Medications   Medication Sig Dispense Refill     escitalopram (LEXAPRO) 20 MG tablet Take 1/2 tablet (10 mg) for 1 week, then increase to 1 tablet orally daily 30 tablet 3     needle, disp, 18G X 1\" MISC Use to draw up hormones once weekly 25 each 3     Needle, Disp, 25G X 1-1/2\" MISC Use once weekly for administering hormone IM 25 each 3     Needle, Disp, 25G X 1-1/2\" MISC Use once weekly for administering hormone IM 25 each 3     syringe, disposable, 1 ML MISC Use to draw up hormones once weekly 25 each 3     testosterone cypionate (DEPOTESTOSTERONE) 200 MG/ML injection Inject 0.3 mLs (60 mg) into the muscle once a week 2 mL 0     testosterone cypionate (DEPOTESTOSTERONE) 200 MG/ML injection Inject 0.3 mLs (60 mg) into the muscle once a week 2 mL 3       History   Smoking Status     Never Smoker   Smokeless Tobacco     Never Used        No Known Allergies    Problem, Medication and Allergy Lists were      Patient Active Problem List    Diagnosis Date Noted     Gender dysphoria in adolescent and adult 10/19/2016     Priority: Medium     Anxiety 10/19/2016     Priority: Medium   ,     Current Outpatient Medications   Medication Sig Dispense Refill     escitalopram (LEXAPRO) 20 MG tablet Take 1/2 tablet (10 mg) for 1 week, then increase to 1 tablet orally daily 30 tablet 3     needle, disp, 18G X 1\" MISC Use to draw up hormones once weekly " "25 each 3     Needle, Disp, 25G X 1-1/2\" MISC Use once weekly for administering hormone IM 25 each 3     Needle, Disp, 25G X 1-1/2\" MISC Use once weekly for administering hormone IM 25 each 3     syringe, disposable, 1 ML MISC Use to draw up hormones once weekly 25 each 3     testosterone cypionate (DEPOTESTOSTERONE) 200 MG/ML injection Inject 0.3 mLs (60 mg) into the muscle once a week 2 mL 0     testosterone cypionate (DEPOTESTOSTERONE) 200 MG/ML injection Inject 0.3 mLs (60 mg) into the muscle once a week 2 mL 3   ,   No Known Allergies.         Review of Systems:      General    Fat redistribution: not applicable    Weight change: not applicable HEENT    Voice change: no     Cardiovascular (CV)    Chest Pains: no    Shortness of breath: no Chest    Decreased exercise tolerance:  not applicable    Breast changes/development: no     Gastrointestinal (GI)    Abdominal pain: no    Change in appetite: increased Skin    Acne or oily skin: YES    Change in hair: YES     Genitourinary ()    Abnormal vaginal bleeding: no     Decreased spontaneous erections: not applicable    Change in libido: YES    New sexual partners: no Musculoskeletal    Leg pain or swelling: no     Psychiatric (Psych)    Depression: YES    Anxiety/Panic: YES    Mood:  \"okay\"                    Physical Exam:     Vitals:    02/08/19 1309   BP: 129/82   Pulse: 97   Resp: 18   SpO2: 99%   Weight: 66.3 kg (146 lb 3.2 oz)     BMI= Body mass index is 26.74 kg/m .   Wt Readings from Last 10 Encounters:   02/08/19 66.3 kg (146 lb 3.2 oz)   11/15/18 70.6 kg (155 lb 9.6 oz)   08/14/18 68.8 kg (151 lb 9.6 oz)   05/21/18 68.6 kg (151 lb 3.2 oz) (81 %)*   04/24/18 68.4 kg (150 lb 12.8 oz) (81 %)*   02/21/18 64.7 kg (142 lb 9.6 oz) (73 %)*   10/23/17 60.2 kg (132 lb 12.8 oz) (61 %)*   08/03/17 59.6 kg (131 lb 6.4 oz) (59 %)*   05/31/17 60.9 kg (134 lb 3.2 oz) (65 %)*   04/25/17 60.7 kg (133 lb 12.8 oz) (64 %)*     * Growth percentiles are based on CDC (Girls, " 2-20 Years) data.     Appearance: Male appearance and dress    GENERAL:: healthy, alert and no distress  RESP: lungs clear to auscultation - no rales, no rhonchi, no wheezes  CV: regular rates and rhythm, normal S1 S2, no S3 or S4 and no murmur, no click or rub -  MS: extremities normal- no gross deformities noted, no edema  NEURO: Normal strength and tone, sensory exam grossly normal, mentation intact and speech normal, reflexes symmetric  Psych: Alert and oriented times 3; coherent speech, normal rate and volume, able to articulate logical thoughts, able to abstract reason, no tangential thoughts, no hallucinations or delusions. Affect is normal..  Affect: Appropriate/mood-congruent           Labs:   Results from last visit:  Office Visit on 11/15/2018   Component Date Value Ref Range Status     Testosterone Total 11/15/2018 751* 8 - 60 ng/dL Final    Comment: This test was developed and its performance characteristics determined by the   [facility],  Special Chemistry Laboratory. It has   not been cleared or approved by the FDA. The laboratory is regulated under   CLIA as qualified to perform high-complexity testing. This test is used for   clinical purposes. It should not be regarded as investigational or for   research.       Albumin 11/15/2018 4.8  3.8 - 5.0 mg/dL Final     Alkaline Phosphatase 11/15/2018 52.4  31.7 - 110.5 U/L Final     ALT 11/15/2018 9.5  0.0 - 45.0 U/L Final     AST 11/15/2018 13.0  0.0 - 45.0 U/L Final     Bilirubin Direct 11/15/2018 0.2  0.1 - 0.3 mg/dL Final     Bilirubin Total 11/15/2018 0.5  0.2 - 1.3 mg/dL Final     Protein Total 11/15/2018 7.4  6.8 - 8.8 g/dL Final     WBC 11/15/2018 8.0  4.0 - 11.0 K/uL Final     Lymphocytes # 11/15/2018 2.8  0.8 - 5.3 K/uL Final     % Lymphocytes 11/15/2018 34.8  20.0 - 48.0 %L Final     Mid # 11/15/2018 0.6  0.0 - 2.2 K/uL Final     Mid % 11/15/2018 6.9  0.0 - 20.0 %M Final     GRANULOCYTES # 11/15/2018 4.7  1.6 - 8.3  "K/uL Final     % Granulocytes 11/15/2018 58.3  40.0 - 75.0 %G Final     RBC 11/15/2018 5.19  3.80 - 5.20 M/uL Final     Hemoglobin 11/15/2018 16.1* 11.7 - 15.7 g/dL Final     Hematocrit 11/15/2018 51.5* 35.0 - 47.0 % Final     MCV 11/15/2018 99.2  78.0 - 100.0 fL Final     MCH 11/15/2018 31.0  26.5 - 35.0 pg Final     MCHC 11/15/2018 31.3* 32.0 - 36.0 g/dL Final     Platelets 11/15/2018 239.0  150.0 - 450.0 K/uL Final     Cholesterol 11/15/2018 152.6  0.0 - 200.0 mg/dL Final     Cholesterol/HDL Ratio 11/15/2018 3.7  0.0 - 5.0 Final     HDL Cholesterol 11/15/2018 40.8  >40.0 mg/dL Final     LDL Cholesterol Calculated 11/15/2018 83  0 - 129 mg/dL Final     Triglycerides 11/15/2018 141.9  0.0 - 150.0 mg/dL Final     VLDL Cholesterol 11/15/2018 28.4  7.0 - 32.0 mg/dL Final     Glucose 11/15/2018 76.0  70.0 - 99.0 mg'dL Final       Assessment and Plan     1. Gender dysphoria in adolescent and adult  - testosterone cypionate (DEPOTESTOSTERONE) 200 MG/ML injection; Inject 0.3 mLs (60 mg) into the muscle once a week  Dispense: 2 mL; Refill: 0  - Needle, Disp, 25G X 1-1/2\" MISC; Use once weekly for administering hormone IM  Dispense: 25 each; Refill: 3    Counselled patient about controlled substances: Yes. No takeing any now.     Follow up:  Follow up in 3 months.  Results by mychart  Questions were elicited and answered.     Lisa Hernandez MD    "

## 2019-02-08 NOTE — PROGRESS NOTES
Preceptor Attestation:   Patient seen, evaluated and discussed with the resident. I have verified the content of the note, which accurately reflects my assessment of the patient and the plan of care.   Supervising Physician:  Gabe Manley MD

## 2019-03-01 ENCOUNTER — ALLIED HEALTH/NURSE VISIT (OUTPATIENT)
Dept: FAMILY MEDICINE | Facility: CLINIC | Age: 19
End: 2019-03-01
Payer: COMMERCIAL

## 2019-03-01 DIAGNOSIS — F64.0 GENDER DYSPHORIA IN ADOLESCENT AND ADULT: Primary | ICD-10-CM

## 2019-03-01 NOTE — NURSING NOTE
Patient has been safely injecting using the IM method and presented to clinic for instruction on switching to subcutaneous method.  RN discussed site for SQ injection and reviewed proper SQ injection technique. Patient practiced using injecting pad, and demonstrated safe techniques.    Patient verbalized understanding and was engaged during appointment.    Dr. Morton was the preceptor on site for this visit and available for questions.    Meghan Martin RN

## 2019-03-07 ENCOUNTER — TELEPHONE (OUTPATIENT)
Dept: FAMILY MEDICINE | Facility: CLINIC | Age: 19
End: 2019-03-07

## 2019-03-07 DIAGNOSIS — F64.0 GENDER DYSPHORIA IN ADOLESCENT AND ADULT: Primary | ICD-10-CM

## 2019-03-07 NOTE — TELEPHONE ENCOUNTER
[facility] does not have the ability to get the correct needles for 25 g 5/8. RN contacted patient and they will come to [facility] only for the needles, they still want the syringes and everything else at their [facility] pharmacy.  Meghan Martin RN

## 2019-03-07 NOTE — TELEPHONE ENCOUNTER
[facility] phone call message- medication clarification/question:    Full Medication Name: syringe, disposable, 1 ML MISC   Dose: Use to draw up hormones once weekly    Question/Clarification needed: Syringes don't fit with the new needle size that was ordered for the patient.     Pharmacy confirmed as   [id number] [place] [id number] [facility] [facility] [address]  Phone: [phone] Fax: [phone]  : Yes    Please leave ONLY preferred pharmacy    OK to leave a message on voice mail? Yes    Advised patient that RN would call back within 3 hours, unless emergent.    Primary language: English      needed? No    Call taken on March 7, 2019 at 10:57 AM by Meredith Gabriel to [facility]

## 2019-03-18 ENCOUNTER — MYC REFILL (OUTPATIENT)
Dept: FAMILY MEDICINE | Facility: CLINIC | Age: 19
End: 2019-03-18

## 2019-03-18 DIAGNOSIS — F64.0 GENDER DYSPHORIA IN ADOLESCENT AND ADULT: ICD-10-CM

## 2019-03-18 RX ORDER — TESTOSTERONE CYPIONATE 200 MG/ML
60 INJECTION, SOLUTION INTRAMUSCULAR WEEKLY
Qty: 2 ML | Refills: 3 | Status: CANCELLED | OUTPATIENT
Start: 2019-03-18

## 2019-03-26 RX ORDER — TESTOSTERONE CYPIONATE 200 MG/ML
60 INJECTION, SOLUTION INTRAMUSCULAR WEEKLY
Qty: 4 ML | Refills: 1 | Status: SHIPPED | OUTPATIENT
Start: 2019-03-26 | End: 2019-05-13

## 2019-03-26 NOTE — TELEPHONE ENCOUNTER
"Per PCP \"  Please have Preceptor sign prescription.     Rx has been reviewed.     Thank you,     -Temur    Routing Comment     \"RN printed rx and had preceptor Dr. Fagan sign. Walked over to [facility]    Jessica Metz RN    "

## 2019-04-03 ENCOUNTER — PATIENT OUTREACH (OUTPATIENT)
Dept: PLASTIC SURGERY | Facility: CLINIC | Age: 19
End: 2019-04-03

## 2019-04-03 DIAGNOSIS — F64.0 GENDER DYSPHORIA IN ADOLESCENT AND ADULT: Primary | ICD-10-CM

## 2019-04-03 NOTE — PROGRESS NOTES
[facility]:  Care Coordination Note     SITUATION   Patient is a 18 year old who is receiving support for:  Clinic Care Coordination - Initial (New patient requesting top consultation)  .    BACKGROUND     New patient, self referred, requesting top consultation. PT was hoping for surgery over the summer 2019 due to going to college. Realistic timeframe discussed.     ASSESSMENT     Surgery              CGC Assessment  [facility] Care ([facility]) Enrollment: Enrolled(Hormones for 2 years, wtih Dr. Gage at [facility])  Patient has a therapist: Yes  Name of therapist: Dunia Snow in private practice  Letter of support #1: Requested  Surgery being considered: Yes  Mastectomy: Yes          PLAN          Nursing Interventions:   [facility] program and services discussed with patient. CGC referral placed. [facility] assessment completed. Process for accessing surgery discussed including: WPATH standards of care, Letters of support, [place] insurance process, surgery scheduling, and approximate timeline. Pt questions answered. Registration completed & reviewed; scheduling process discussed & completed, as necessary.     More than 50% of the time was used to educate patient on medical & surgical process.     Follow-up plan:  Pt to obtain one letter of support from mental health providers and fax to Dr. Mono Altamirano's office or bring to consultation. Fax # was provided. [facility]        Spencer Reyna

## 2019-04-10 ENCOUNTER — MYC REFILL (OUTPATIENT)
Dept: FAMILY MEDICINE | Facility: CLINIC | Age: 19
End: 2019-04-10

## 2019-04-10 ENCOUNTER — OFFICE VISIT (OUTPATIENT)
Dept: FAMILY MEDICINE | Facility: CLINIC | Age: 19
End: 2019-04-10
Payer: COMMERCIAL

## 2019-04-10 VITALS
BODY MASS INDEX: 25.61 KG/M2 | RESPIRATION RATE: 16 BRPM | HEART RATE: 86 BPM | WEIGHT: 140 LBS | OXYGEN SATURATION: 98 % | SYSTOLIC BLOOD PRESSURE: 115 MMHG | TEMPERATURE: 98.5 F | DIASTOLIC BLOOD PRESSURE: 79 MMHG

## 2019-04-10 DIAGNOSIS — F64.0 GENDER DYSPHORIA IN ADOLESCENT AND ADULT: Primary | ICD-10-CM

## 2019-04-10 DIAGNOSIS — F64.0 GENDER DYSPHORIA IN ADOLESCENT AND ADULT: ICD-10-CM

## 2019-04-10 NOTE — LETTER
To Whom it May Concern:       April 10, 2019  Regarding:  Varsha Meredith  : 2000  Pt goes by: Paxton  MRN: [id number]    Dear Evaluator,    I am writing on behalf of my patient, Varsha Meredith, who uses Paxton.  I understand Paxton is requesting a variance so that his documents may reflect Paxton's actual gender. I am writing to provide a summary of Paxton s medical care to provide further support for his gender being listed as Male.  Paxton was female [id number] and has transitioned gender to Male.     I am a [place]-licensed family physician practicing at [facility] in [place]. I am experienced in evaluation and treatment of patients with gender dysphoria and/or nonbinary gender identities.    Paxton and began a doctor-patient relationship with our clinic in 2016. I have reviewed and evaluated Paxton's medical history.    Varsha Meredith has had the appropriate clinical treatment for transition to the gender Male.    Documents, like a  s license, passport or birth certificate that accurately identify Paxton, are essential.  I recommend these reflect Paxton's accurate and true gender identity.      I declare under penalty of perjury under the laws of the [place] that the forgoing is true and correct.      Sincerely,    DO Nathaniel Yanes MD  Resident Physician     Attending Physician    [facility]

## 2019-04-10 NOTE — PROGRESS NOTES
HPI       Paxton Meredith is a 18 year old  who presents for   Chief Complaint   Patient presents with     RECHECK     HRT- need letter for court      Patient has a meeting 4/30 with a  to change gender marker on license. Patient has been seen regularly since 2016 here at clinic for gender dysphoria. Things have been going well, recently switched to subQ testosterone, which he is liking a lot more - given smaller needle, less fear/pain. Patient wanting to know when we can check labs again, does know he is due in May for labs, but was not sure if this changed given the switch to subQ.    +++++++  Problem, Medication and Allergy Lists were reviewed and updated if needed..    Patient is an established patient of this clinic..         Review of Systems:   Review of Systems         Physical Exam:     Vitals:    04/10/19 1449   BP: 115/79   BP Location: Left arm   Patient Position: Sitting   Cuff Size: Adult Regular   Pulse: 86   Resp: 16   Temp: 98.5  F (36.9  C)   TempSrc: Oral   SpO2: 98%   Weight: 63.5 kg (140 lb)     Body mass index is 25.61 kg/m .  Vitals were reviewed and were normal     Physical Exam   Constitutional: He appears well-developed and well-nourished. No distress.   Neurological: He is alert.   Skin: He is not diaphoretic.   Psychiatric: He has a normal mood and affect. His behavior is normal. Judgment and thought content normal.   Nursing note and vitals reviewed.  Male appearance and dress.      Results:   No testing ordered today    Assessment and Plan        1. Gender dysphoria in adolescent and adult  Provided patient with letter in support of changing sex assigned at birth (Female) to identified gender (Male).  Reassured patient that May for labs is appropriate, but if he is worried then he could have labs checked as early as 1 month after the change to subQ testosterone.         There are no discontinued medications.    Options for treatment and follow-up care were reviewed with the  patient. Varsha Meredith  engaged in the decision making process and verbalized understanding of the options discussed and agreed with the final plan.    Mindy Maria, DO

## 2019-05-13 ENCOUNTER — OFFICE VISIT (OUTPATIENT)
Dept: FAMILY MEDICINE | Facility: CLINIC | Age: 19
End: 2019-05-13
Payer: COMMERCIAL

## 2019-05-13 VITALS
OXYGEN SATURATION: 98 % | SYSTOLIC BLOOD PRESSURE: 104 MMHG | BODY MASS INDEX: 25.83 KG/M2 | WEIGHT: 141.2 LBS | DIASTOLIC BLOOD PRESSURE: 71 MMHG | HEART RATE: 109 BPM | RESPIRATION RATE: 16 BRPM

## 2019-05-13 DIAGNOSIS — F64.0 GENDER DYSPHORIA IN ADOLESCENT AND ADULT: ICD-10-CM

## 2019-05-13 DIAGNOSIS — F64.9 GENDER DYSPHORIA: Primary | ICD-10-CM

## 2019-05-13 LAB
% GRANULOCYTES: 64.3 %G (ref 40–75)
GRANULOCYTES #: 4.6 K/UL (ref 1.6–8.3)
HCT VFR BLD AUTO: 54.6 % (ref 40–53)
HEMOGLOBIN: 16.8 G/DL (ref 13.3–17.7)
LYMPHOCYTES # BLD AUTO: 2.1 K/UL (ref 0.8–5.3)
LYMPHOCYTES NFR BLD AUTO: 29.7 %L (ref 20–48)
MCH RBC QN AUTO: 30.7 PG (ref 26.5–35)
MCHC RBC AUTO-ENTMCNC: 30.8 G/DL (ref 32–36)
MCV RBC AUTO: 99.6 FL (ref 78–100)
MID #: 0.4 K/UL (ref 0–2.2)
MID %: 6 %M (ref 0–20)
PLATELET # BLD AUTO: 232 K/UL (ref 150–450)
RBC # BLD AUTO: 5.48 M/UL (ref 4.4–5.9)
WBC # BLD AUTO: 7.2 K/UL (ref 4–11)

## 2019-05-13 RX ORDER — TESTOSTERONE CYPIONATE 200 MG/ML
60 INJECTION, SOLUTION INTRAMUSCULAR WEEKLY
Qty: 5 ML | Refills: 1 | Status: SHIPPED | OUTPATIENT
Start: 2019-05-13 | End: 2019-08-08

## 2019-05-13 NOTE — PROGRESS NOTES
"       JIGAR Matta is a 18 year old individual that uses pronouns He/Him/His/Himself that presents today for follow up of:  masculinizing hormone therapy. Gender identity: Male    Any special concerns today?    Last lab checks were in November 2018: Labs are within normal limits except for elevated hemoglobin at 16.1.  Healthcare maintenance due: HIV screening, third dose of HPV.    On hormones?  YES +++ Shot day of the week? Wednesday      Due for labs?  Not due for yearly - but had elevated Hgb 16.1 back in November 2018      +++ Refills of meds needed?  Yes 3/1  ---    No past surgical history on file.    Patient Active Problem List   Diagnosis     Gender dysphoria in adolescent and adult     Anxiety     Current Outpatient Medications   Medication Sig Dispense Refill     Needle, Disp, 25G X 5/8\" MISC Use to inject hormones weekly 25 each 11     testosterone cypionate (DEPOTESTOSTERONE) 200 MG/ML injection Inject 0.3 mLs (60 mg) into the muscle once a week 5 mL 1     testosterone cypionate (DEPOTESTOSTERONE) 200 MG/ML injection Inject 0.3 mLs (60 mg) into the muscle once a week 2 mL 0     needle, disp, 18G X 1\" MISC Use to draw up hormones once weekly (Patient not taking: Reported on 5/13/2019) 25 each 3     syringe, disposable, 1 ML MISC Use to draw up hormones once weekly (Patient not taking: Reported on 5/13/2019) 25 each 3       History   Smoking Status     Never Smoker   Smokeless Tobacco     Never Used      No Known Allergies    Problem, Medication and Allergy Lists were reviewed and are current..         Review of Systems:      General    Fat redistribution: no    Weight change: YES- reports losing weight overall (about 13 pounds since November, but 1 pound up from last visit in April); has been eating differently HEENT    Voice change: YES, gotten lower since changing subQ     Cardiovascular (CV)    Chest Pains: related to wearing binder a lot - consultation in September with Dr. Altamirano for " "Mastectomy    Shortness of breath: no Chest    Decreased exercise tolerance:  no    Breast changes/development: not applicable     Gastrointestinal (GI)    Abdominal pain: no    Change in appetite: no Skin    Acne or oily skin: no    Change in hair: no     Genitourinary ()    Abnormal vaginal bleeding: no, but sometimes menstrual cramps    Decreased spontaneous erections: not applicable    Change in libido: no    New sexual partners: no Musculoskeletal    Leg pain or swelling: no     Psychiatric (Psych)    Depression: no - no episodes lately, but previously has had issues - tends to not eat as much when feeling down     Anxiety/Panic: feels anxious, \"overthinks a lot\" - no panic attacks    Mood:  \"pretty good\"                Physical Exam:     Vitals:    05/13/19 1432   BP: 104/71   Pulse: 109   Resp: 16   SpO2: 98%   Weight: 64 kg (141 lb 3.2 oz)     BMI= Body mass index is 25.83 kg/m .   Wt Readings from Last 10 Encounters:   05/13/19 64 kg (141 lb 3.2 oz)   04/10/19 63.5 kg (140 lb)   02/08/19 66.3 kg (146 lb 3.2 oz)   11/15/18 70.6 kg (155 lb 9.6 oz)   08/14/18 68.8 kg (151 lb 9.6 oz)   05/21/18 68.6 kg (151 lb 3.2 oz) (81 %)*   04/24/18 68.4 kg (150 lb 12.8 oz) (81 %)*   02/21/18 64.7 kg (142 lb 9.6 oz) (73 %)*   10/23/17 60.2 kg (132 lb 12.8 oz) (61 %)*   08/03/17 59.6 kg (131 lb 6.4 oz) (59 %)*     * Growth percentiles are based on CDC (Girls, 2-20 Years) data.     Appearance: Male appearance and dress    Physical Exam   Constitutional: He appears well-developed and well-nourished. No distress.   Eyes: Conjunctivae are normal.   Cardiovascular: Normal rate.   Pulmonary/Chest: Effort normal.   Neurological: He is alert.   Skin: He is not diaphoretic.   Psychiatric: He has a normal mood and affect. His behavior is normal. Judgment and thought content normal.   Vitals reviewed.    Affect: Appropriate/mood-congruent         Labs:   Labs done today, pending results.    Assessment and Plan     Paxton was seen " "today for recheck.    Diagnoses and all orders for this visit:    Gender dysphoria  -     Testosterone Total  -     CBC with Diff Plt    Gender dysphoria in adolescent and adult  -     Needle, Disp, 25G X 5/8\" MISC; Use to inject hormones weekly  -     testosterone cypionate (DEPOTESTOSTERONE) 200 MG/ML injection; Inject 0.3 mLs (60 mg) into the muscle once a week      Patient with elevated hemoglobin, although within normal range for cismale.  We will recheck labs today (CBC), and if hemoglobin is greater than 17.5, or hematocrit greater than 55, will discuss with patient about work-up for polycythemia versus blood donation.  Patient expresses concern with blood donation, as he has a fear of needles.  Patient recently switched from IM to subcu, about 2 months ago.  We will recheck testosterone as well.    Contraception:   Abstinence, condoms    Counselled patient about controlled substances: Yes. Details: paper prescription, how to get refills, etc.    Follow up:  Follow up in 1 month if hemoglobin is greater than 17.5, or hematocrit greater than 55.  If labs are within normal limits, patient to reach out for refills in 6 months, and needs in person follow-up for labs in 1 year.    Results by carleen  Questions were elicited and answered.     Mindy Maria, DO    "

## 2019-05-13 NOTE — PROGRESS NOTES
Preceptor Attestation:   Patient seen, evaluated and discussed with the resident. I have verified the content of the note, which accurately reflects my assessment of the patient and the plan of care.   Supervising Physician:  Sherlyn Reyes MD

## 2019-05-13 NOTE — PATIENT INSTRUCTIONS
"Here is the plan from today's visit    1. Gender dysphoria  - Testosterone Total  - CBC with Diff Plt    2. Gender dysphoria in adolescent and adult  - Needle, Disp, 25G X 5/8\" MISC; Use to inject hormones weekly  Dispense: 25 each; Refill: 11  - testosterone cypionate (DEPOTESTOSTERONE) 200 MG/ML injection; Inject 0.3 mLs (60 mg) into the muscle once a week  Dispense: 4 mL; Refill: 1    Please call or return to clinic if your symptoms don't go away.    Follow up plan  Please make a clinic appointment for follow up with your primary physician Lisa Hernandez MD in 1 year IF hemoglobin is normal today.    Thank you for coming to [facility] today.  Lab Testing:  **If you had lab testing today and your results are reassuring or normal they will be mailed to you or sent through [employer] within 7 days.   **If the lab tests need quick action we will call you with the results.  The phone number we will call with results is # [phone] (home) . If this is not the best number please call our clinic and change the number.  Medication Refills:  If you need any refills please call your pharmacy and they will contact us.   If you need to  your refill at a new pharmacy, please contact the new pharmacy directly. The new pharmacy will help you get your medications transferred faster.   Scheduling:  If you have any concerns about today's visit or wish to schedule another appointment please call our office during normal business hours [phone] (8-5:00 M-F)  If a referral was made to a [facility] and you don't get a call from central scheduling please call [phone].  If a Mammogram was ordered for you at [facility] call [phone] to schedule or change your appointment.  If you had an XRay/CT/Ultrasound/MRI ordered the number is [phone] to schedule or change your radiology appointment.   Medical Concerns:  If you have urgent medical concerns please call [phone] at any " time of the day.    Mindy Maria,

## 2019-05-15 LAB — TESTOST SERPL-MCNC: 359 NG/DL (ref 240–950)

## 2019-06-12 ENCOUNTER — MYC REFILL (OUTPATIENT)
Dept: FAMILY MEDICINE | Facility: CLINIC | Age: 19
End: 2019-06-12

## 2019-06-12 DIAGNOSIS — F64.0 GENDER DYSPHORIA IN ADOLESCENT AND ADULT: ICD-10-CM

## 2019-07-19 ENCOUNTER — OFFICE VISIT (OUTPATIENT)
Dept: URGENT CARE | Facility: URGENT CARE | Age: 19
End: 2019-07-19
Payer: COMMERCIAL

## 2019-07-19 VITALS
RESPIRATION RATE: 16 BRPM | HEIGHT: 62 IN | OXYGEN SATURATION: 100 % | DIASTOLIC BLOOD PRESSURE: 67 MMHG | HEART RATE: 97 BPM | WEIGHT: 132 LBS | SYSTOLIC BLOOD PRESSURE: 119 MMHG | BODY MASS INDEX: 24.29 KG/M2

## 2019-07-19 DIAGNOSIS — S00.551A FOREIGN BODY IN LIP, INITIAL ENCOUNTER: Primary | ICD-10-CM

## 2019-07-19 PROCEDURE — 40804 REMOVAL FOREIGN BODY MOUTH: CPT | Performed by: FAMILY MEDICINE

## 2019-07-19 ASSESSMENT — MIFFLIN-ST. JEOR: SCORE: 1488

## 2019-07-19 NOTE — PROGRESS NOTES
"SUBJECTIVE:  Paxton Meredith is a 18 year old adult who complains of a piercing in his left lower lip that they are unable to remove because the post is embedded in the lip.    He was sent in by the piercing parlor because they are unable to get it out    OBJECTIVE:  /67 (BP Location: Right arm, Patient Position: Sitting, Cuff Size: Adult Regular)   Pulse 97   Resp 16   Ht 1.575 m (5' 2\")   Wt 59.9 kg (132 lb)   SpO2 100%   BMI 24.14 kg/m    Exam;  examination shows his heart lungs are clear    Somewhat anxious    Taken to the procedure room examination shows a post left lower intact the posterior aspect of this is embedded in the left but there does seem to be a small area where the previous wound was in his left.    ASSESSMENT:  1.  Foreign body in his lip    Plan  He was taken to the procedure room there the area was cleaned prepped and draped.  We we placed topical anesthesia on the lip.  However we then anesthesised the area with lidocaine with epinephrine.    The post on the exterior lip was obvious.  However the interposed was buried deep within the subcutaneous tissue.    Using the local anesthesia we then used a #11 blade and made an incision over what we thought was the base of the proximal.  This was subcutaneous and then we were able to identify an embedded foreign body.    We were able to dissect the scar from around the base of the post and we were able to fully visualize the end of the post.  We used a #11 blade for dissection    We were then able to grasp the post and pull it inward.  It was removed in total.    Dissection took about 20 minutes    Minimal bleeding.    Tolerated well     antibiotics for 5 days        "

## 2019-08-08 ENCOUNTER — OFFICE VISIT (OUTPATIENT)
Dept: FAMILY MEDICINE | Facility: CLINIC | Age: 19
End: 2019-08-08
Payer: COMMERCIAL

## 2019-08-08 VITALS
HEIGHT: 61 IN | TEMPERATURE: 98.2 F | RESPIRATION RATE: 16 BRPM | SYSTOLIC BLOOD PRESSURE: 109 MMHG | OXYGEN SATURATION: 97 % | BODY MASS INDEX: 24.84 KG/M2 | HEART RATE: 88 BPM | DIASTOLIC BLOOD PRESSURE: 73 MMHG | WEIGHT: 131.6 LBS

## 2019-08-08 DIAGNOSIS — F41.0 PANIC DISORDER: Primary | ICD-10-CM

## 2019-08-08 DIAGNOSIS — F64.0 GENDER DYSPHORIA IN ADULT: ICD-10-CM

## 2019-08-08 RX ORDER — HYDROXYZINE HYDROCHLORIDE 25 MG/1
25 TABLET, FILM COATED ORAL 3 TIMES DAILY PRN
Qty: 30 TABLET | Refills: 1 | Status: SHIPPED | OUTPATIENT
Start: 2019-08-08 | End: 2020-03-23

## 2019-08-08 RX ORDER — TESTOSTERONE CYPIONATE 200 MG/ML
60 INJECTION, SOLUTION INTRAMUSCULAR WEEKLY
Qty: 5 ML | Refills: 3 | Status: SHIPPED | OUTPATIENT
Start: 2019-08-08 | End: 2019-11-04

## 2019-08-08 RX ORDER — SERTRALINE HYDROCHLORIDE 25 MG/1
TABLET, FILM COATED ORAL
Qty: 90 TABLET | Refills: 3 | Status: SHIPPED | OUTPATIENT
Start: 2019-08-08 | End: 2019-09-11

## 2019-08-08 ASSESSMENT — ANXIETY QUESTIONNAIRES
7. FEELING AFRAID AS IF SOMETHING AWFUL MIGHT HAPPEN: NEARLY EVERY DAY
5. BEING SO RESTLESS THAT IT IS HARD TO SIT STILL: MORE THAN HALF THE DAYS
1. FEELING NERVOUS, ANXIOUS, OR ON EDGE: NEARLY EVERY DAY
2. NOT BEING ABLE TO STOP OR CONTROL WORRYING: NEARLY EVERY DAY
GAD7 TOTAL SCORE: 18
6. BECOMING EASILY ANNOYED OR IRRITABLE: MORE THAN HALF THE DAYS
IF YOU CHECKED OFF ANY PROBLEMS ON THIS QUESTIONNAIRE, HOW DIFFICULT HAVE THESE PROBLEMS MADE IT FOR YOU TO DO YOUR WORK, TAKE CARE OF THINGS AT HOME, OR GET ALONG WITH OTHER PEOPLE: EXTREMELY DIFFICULT
3. WORRYING TOO MUCH ABOUT DIFFERENT THINGS: NEARLY EVERY DAY

## 2019-08-08 ASSESSMENT — PAIN SCALES - GENERAL: PAINLEVEL: NO PAIN (0)

## 2019-08-08 ASSESSMENT — PATIENT HEALTH QUESTIONNAIRE - PHQ9
5. POOR APPETITE OR OVEREATING: MORE THAN HALF THE DAYS
SUM OF ALL RESPONSES TO PHQ QUESTIONS 1-9: 15

## 2019-08-08 ASSESSMENT — MIFFLIN-ST. JEOR: SCORE: 1471.92

## 2019-08-08 NOTE — PROGRESS NOTES
Preceptor Attestation:   Patient seen and discussed with the resident. Assessment and plan reviewed with resident and agreed upon.   Supervising Physician:  [facility]

## 2019-08-08 NOTE — PROGRESS NOTES
"       JIGAR Matta is a 19 year old individual that uses pronouns He/Him/His/Himself that presents today for follow up of:  masculinizing hormone therapy. Gender identity: Male    Any special concerns today?  concerns about anxiety. Has been seeing a therapist since January - who recommended being seen for panic attacks - recommended a medication as needed for panic attacks, does not recommend daily medication.    Took lexapro before, helped with baseline anxiety but did not help with panic attacks. Stopped taking about 1 year ago because it was not helping with panic attacks     Excessive anxiety or worry for more than 6 months, shortness of breath, Rapid heartbeat (palpitations), difficulty concentrating due to anxiety, trouble falling asleep, sweating and \"paranoia.\"    Recent PHQ-9 Scores:  PHQ-9 SCORE 3/20/2017 3/25/2017 10/23/2017   PHQ-9 Total Score 16 16 9     Recent ADRIA-7 Scores:   ADRIA-7 SCORE 1/3/2017 3/20/2017 3/25/2017   Total Score 11 17 17     Previous history of anxiety: yes  Previous history of medication therapy: yes  Previous history of suicide attempt: yes, twice - high school (sophomore year - felt depressed, school was very difficult, bullied, took some pills; 2nd time was when he \"came out\" - lost a lot of friends and family members did not accept him, thought \"then why am I here\" - felt life was worthless because he lost a lot of people in his life, took sleeping pills), all occurred before \"knowing trans\"  Current thoughts of suicide or homicide: no  The patient's current risk factors include:personal history of depression   =  On hormones?  YES +++   Due for labs?  No      +++ Refills of meds needed?  Yes  ---    History reviewed. No pertinent surgical history.    Patient Active Problem List   Diagnosis     Gender dysphoria in adolescent and adult     Anxiety       Current Outpatient Medications   Medication Sig Dispense Refill     needle, disp, 18G X 1\" MISC Use to draw up hormones once " "weekly 25 each 3     Needle, Disp, 25G X 5/8\" MISC Use to inject hormones weekly 25 each 11     syringe, disposable, 1 ML MISC Use to draw up hormones once weekly 25 each 3     testosterone cypionate (DEPOTESTOSTERONE) 200 MG/ML injection Inject 0.3 mLs (60 mg) into the muscle once a week 5 mL 1     testosterone cypionate (DEPOTESTOSTERONE) 200 MG/ML injection Inject 0.3 mLs (60 mg) into the muscle once a week 2 mL 0       History   Smoking Status     Never Smoker   Smokeless Tobacco     Never Used        No Known Allergies    Problem, Medication and Allergy Lists were reviewed and are current..         Review of Systems:      General    Fat redistribution: no    Weight change: no HEENT    Voice change: YES     Cardiovascular (CV)    Chest Pains: no    Shortness of breath: no Chest    Decreased exercise tolerance:  no    Breast changes/development: no     Gastrointestinal (GI)    Abdominal pain: no    Change in appetite: no Skin    Acne or oily skin: YES    Change in hair: YES     Genitourinary ()    Abnormal vaginal bleeding: no     Decreased spontaneous erections: not applicable Musculoskeletal    Leg pain or swelling: no     Psychiatric (Psych)    Depression: YES    Anxiety/Panic: YES    Mood:  anxious                    Physical Exam:     Vitals:    08/08/19 1103   BP: 109/73   Pulse: 88   Resp: 16   Temp: 98.2  F (36.8  C)   TempSrc: Oral   SpO2: 97%   Weight: 59.7 kg (131 lb 9.6 oz)   Height: 1.56 m (5' 1.42\")     BMI= Body mass index is 24.53 kg/m .   Wt Readings from Last 10 Encounters:   08/08/19 59.7 kg (131 lb 9.6 oz)   07/19/19 59.9 kg (132 lb)   05/13/19 64 kg (141 lb 3.2 oz)   04/10/19 63.5 kg (140 lb)   02/08/19 66.3 kg (146 lb 3.2 oz)   11/15/18 70.6 kg (155 lb 9.6 oz)   08/14/18 68.8 kg (151 lb 9.6 oz)   05/21/18 68.6 kg (151 lb 3.2 oz) (81 %)*   04/24/18 68.4 kg (150 lb 12.8 oz) (81 %)*   02/21/18 64.7 kg (142 lb 9.6 oz) (73 %)*     * Growth percentiles are based on CDC (Girls, 2-20 Years) data. " "    Appearance: Male appearance and dress  Physical Exam   Constitutional: He appears well-developed and well-nourished. No distress.   Cardiovascular: Normal rate, regular rhythm and normal heart sounds.   Pulmonary/Chest: Effort normal and breath sounds normal.   Abdominal: Soft. Bowel sounds are normal.   Neurological: He is alert.   Skin: He is not diaphoretic.   Psychiatric: His behavior is normal. Judgment and thought content normal.   Vitals reviewed.    Affect:congruent with Anxious/Nervous  mood         Labs:   No testing ordered today, reviewed last lab check together, which was on 5/13/2019    Assessment and Plan   Paxton was seen today for medication follow-up.    Diagnoses and all orders for this visit:    Panic disorder  -     sertraline (ZOLOFT) 25 MG tablet; Take one tablet (25mg) by mouth daily for 1 week, then take 2 tablets (50mg) by mouth daily for 1 week, then take 3 tabs (75mg) thereafter  -     hydrOXYzine (ATARAX) 25 MG tablet; Take 1 tablet (25 mg) by mouth 3 times daily as needed for anxiety    Gender dysphoria in adult  -     needle, disp, 18G X 1\" MISC; Use to draw up hormones once weekly  -     syringe, disposable, 1 ML MISC; Use to draw up hormones once weekly  -     testosterone cypionate (DEPOTESTOSTERONE) 200 MG/ML injection; Inject 0.3 mLs (60 mg) into the muscle once a week      Plan:  Labs:  None today  Medications:   SSRI - zoloft daily, with hydroxyzine PRN (Will try hydroxyzine, and if not helping with acute panic symptoms, will discuss other PRN medications - recommend avoiding benzos at this time, which patient is agreeable to). Counseled patient on hydroxyzine being a bridge and PRN till zoloft works - patient will need daily medication to help reduce overall anxiety and panic symptoms. Set realistic goals and time frame on medication and management of symptoms.   Referrals/Other:  Counseling recommended (patient currently enrolled and will continue, has good established " relationship with psychotherapy at this time)    Discussed the pathophysiology of anxiety episodes and the various symptoms seen associated with anxiety episodes.  Discussed possible triggers including fatigue, depression, stress, and chemicals such as alcohol, caffeine and certain drugs.   Exercise discussed and patient  and the importance of  adequate rest, and both rescue meds and maintenance meds.    Contraception:   Counseled on risks of pregnancy    Counselled patient about controlled substances: Yes. Details: paper Rx, cannot share med    Follow up:  Follow up in 2 weeks to 1 month to evaluate panic disorder  Results by carleen  Questions were elicited and answered.     Mindy Maria, DO

## 2019-08-09 ASSESSMENT — ANXIETY QUESTIONNAIRES: GAD7 TOTAL SCORE: 18

## 2019-08-13 PROBLEM — F41.0 PANIC DISORDER: Status: ACTIVE | Noted: 2019-08-13

## 2019-08-22 ENCOUNTER — MYC REFILL (OUTPATIENT)
Dept: FAMILY MEDICINE | Facility: CLINIC | Age: 19
End: 2019-08-22

## 2019-08-22 DIAGNOSIS — F64.0 GENDER DYSPHORIA IN ADOLESCENT AND ADULT: ICD-10-CM

## 2019-09-03 ENCOUNTER — PATIENT OUTREACH (OUTPATIENT)
Dept: PLASTIC SURGERY | Facility: CLINIC | Age: 19
End: 2019-09-03

## 2019-09-03 ENCOUNTER — OFFICE VISIT (OUTPATIENT)
Dept: PLASTIC SURGERY | Facility: CLINIC | Age: 19
End: 2019-09-03
Payer: COMMERCIAL

## 2019-09-03 VITALS
OXYGEN SATURATION: 98 % | WEIGHT: 130.9 LBS | DIASTOLIC BLOOD PRESSURE: 75 MMHG | HEART RATE: 116 BPM | SYSTOLIC BLOOD PRESSURE: 118 MMHG | BODY MASS INDEX: 24.09 KG/M2 | HEIGHT: 62 IN

## 2019-09-03 DIAGNOSIS — Z12.31 VISIT FOR SCREENING MAMMOGRAM: ICD-10-CM

## 2019-09-03 DIAGNOSIS — F64.0 GENDER DYSPHORIA IN ADULT: Primary | ICD-10-CM

## 2019-09-03 ASSESSMENT — PAIN SCALES - GENERAL: PAINLEVEL: NO PAIN (0)

## 2019-09-03 ASSESSMENT — MIFFLIN-ST. JEOR: SCORE: 1470.07

## 2019-09-03 NOTE — NURSING NOTE
"Chief Complaint   Patient presents with     Consult     new pt here for top surg consult       Vitals:    09/03/19 1626   BP: 118/75   BP Location: Left arm   Patient Position: Chair   Cuff Size: Adult Regular   Pulse: 116   SpO2: 98%   Weight: 59.4 kg (130 lb 14.4 oz)   Height: 1.562 m (5' 1.5\")       Body mass index is 24.33 kg/m .    Jeremy Luevano, EMT    "

## 2019-09-03 NOTE — LETTER
"9/3/2019       RE: Paxton Meredith  [address]  [facility] [address]     Dear Colleague,    Thank you for referring your patient, Paxton Meredith, to the [facility] PLASTIC AND RECONSTRUCTIVE SURGERY at [facility]. Please see a copy of my visit note below.    REFERRING PROVIDER: Mindy Maria    REASON FOR CONSULTATION: Gender dysphoria, requesting top surgery.    HPI: Patient is a 19-year-old trans-man who prefers he him pronouns, referred to me by Mindy Maria for possible top surgery.  Patient has been considering undergoing top surgery for the past year.  He has history of binding his chest daily for the past 4 years.  In essence, by undergoing top surgery, patient would like to better align his physical body with his chosen gender identity.  Patient transitioned to 4 years ago.  Chosen name is Paxton.  Has been on testosterone hormone therapy for the past 2 years.  Denies any previous breast history.    MEDS:   Prior to Admission medications    Medication Sig Start Date End Date Taking? Authorizing Provider   hydrOXYzine (ATARAX) 25 MG tablet Take 1 tablet (25 mg) by mouth 3 times daily as needed for anxiety 8/8/19  Yes Aleksandra Morton,    needle, disp, 18G X 1\" MISC Use to draw up hormones once weekly 8/8/19  Yes Aleksandra Morton DO   Needle, Disp, 25G X 5/8\" MISC Use to inject hormones weekly 8/22/19  Yes Sherlyn Reyes MD   sertraline (ZOLOFT) 25 MG tablet Take one tablet (25mg) by mouth daily for 1 week, then take 2 tablets (50mg) by mouth daily for 1 week, then take 3 tabs (75mg) thereafter 8/8/19  Yes Aleksandra Morton DO   syringe, disposable, 1 ML MISC Use to draw up hormones once weekly 8/8/19  Yes Aleksandra Morton DO   testosterone cypionate (DEPOTESTOSTERONE) 200 MG/ML injection Inject 0.3 mLs (60 mg) into the muscle once a week 8/8/19  Yes Aleksandra Morton DO   testosterone cypionate (DEPOTESTOSTERONE) 200 MG/ML injection Inject 0.3 mLs (60 mg) into the " "muscle once a week 2/8/19 2/8/20 Yes Gabe Manley MD       ALLERGIES: None.    PMH:   Past Medical History:   Diagnosis Date     Anxiety      Depressive disorder        PSH: None.    SH:   Social History     Tobacco Use     Smoking status: Never Smoker     Smokeless tobacco: Never Used   Substance Use Topics     Alcohol use: No   Works at [employer].    FH:   Family History   Problem Relation Age of Onset     Leukemia Paternal Grandmother      Hypertension Paternal Grandfather      Skin Cancer Other        ROS: Denies chest pain, shortness of breath, diabetes, MI, CVA, DVT, PE, and bleeding disorders.    PHYSICAL EXAMINATION: /75 (BP Location: Left arm, Patient Position: Chair, Cuff Size: Adult Regular)   Pulse 116   Ht 1.562 m (5' 1.5\")   Wt 59.4 kg (130 lb 14.4 oz)   SpO2 98%   BMI 24.33 kg/m     General: No acute distress.  Appears masculine.  Chest examination was performed in the presence of a chaperone.  This revealed bilateral grade 3 ptosis.  Pectoralis muscles intact.  There is minimal sized lateral thoracic rolls bilaterally.  Notch to nipple distance is 22 cm on the right and 23 cm on the left areole or diameter is 5 cm bilaterally.  Nipple to fold distance is 10 cm bilaterally.  Base diameter is 13 cm on the right and 12 cm on the left.    ASSESSMENT: Gender dysphoria, requesting top surgery.    PLAN: I explained the need for letter of support from mental health professional.  I am also requesting a baseline screening mammogram.  With these obtained, patient is a potential candidate for bilateral simple complete mastectomy with free nipple graft reconstruction as a form of top surgery to masculinize the chest.  I explained this outpatient procedure in detail today.  I explained the risks to include bleeding, infection, injury to surrounding structures, fluid collection, nipple or nipple graft loss, nipple sensory loss, change in nipple size, wound healing difficulties, contour deformity, dog " ears, asymmetry, and need for revision surgery.  Patient accepts these risks and wishes to proceed with surgery.  We will wait for letter of support.    Total time spent with patient was 30 min of which greater than 50% was in counseling.    Again, thank you for allowing me to participate in the care of your patient.      Sincerely,    Mono Altamirano MD

## 2019-09-03 NOTE — PROGRESS NOTES
"REFERRING PROVIDER: Mindy Maria    REASON FOR CONSULTATION: Gender dysphoria, requesting top surgery.    HPI: Patient is a 19-year-old trans-man who prefers he him pronouns, referred to me by Mindy Maria for possible top surgery.  Patient has been considering undergoing top surgery for the past year.  He has history of binding his chest daily for the past 4 years.  In essence, by undergoing top surgery, patient would like to better align his physical body with his chosen gender identity.  Patient transitioned to 4 years ago.  Chosen name is Paxton.  Has been on testosterone hormone therapy for the past 2 years.  Denies any previous breast history.    MEDS:   Prior to Admission medications    Medication Sig Start Date End Date Taking? Authorizing Provider   hydrOXYzine (ATARAX) 25 MG tablet Take 1 tablet (25 mg) by mouth 3 times daily as needed for anxiety 8/8/19  Yes Aleksandra Morton,    needle, disp, 18G X 1\" MISC Use to draw up hormones once weekly 8/8/19  Yes Aleksandra Morton DO   Needle, Disp, 25G X 5/8\" MISC Use to inject hormones weekly 8/22/19  Yes Sherlyn Reyes MD   sertraline (ZOLOFT) 25 MG tablet Take one tablet (25mg) by mouth daily for 1 week, then take 2 tablets (50mg) by mouth daily for 1 week, then take 3 tabs (75mg) thereafter 8/8/19  Yes Aleksandra Morton DO   syringe, disposable, 1 ML MISC Use to draw up hormones once weekly 8/8/19  Yes Aleksandra Morton DO   testosterone cypionate (DEPOTESTOSTERONE) 200 MG/ML injection Inject 0.3 mLs (60 mg) into the muscle once a week 8/8/19  Yes Aleksandra Morton DO   testosterone cypionate (DEPOTESTOSTERONE) 200 MG/ML injection Inject 0.3 mLs (60 mg) into the muscle once a week 2/8/19 2/8/20 Yes Gabe Manley MD       ALLERGIES: None.    PMH:   Past Medical History:   Diagnosis Date     Anxiety      Depressive disorder        PSH: None.    SH:   Social History     Tobacco Use     Smoking status: Never Smoker     Smokeless tobacco: Never Used   Substance Use Topics     " "Alcohol use: No   Works at [employer].    FH:   Family History   Problem Relation Age of Onset     Leukemia Paternal Grandmother      Hypertension Paternal Grandfather      Skin Cancer Other        ROS: Denies chest pain, shortness of breath, diabetes, MI, CVA, DVT, PE, and bleeding disorders.    PHYSICAL EXAMINATION: /75 (BP Location: Left arm, Patient Position: Chair, Cuff Size: Adult Regular)   Pulse 116   Ht 1.562 m (5' 1.5\")   Wt 59.4 kg (130 lb 14.4 oz)   SpO2 98%   BMI 24.33 kg/m    General: No acute distress.  Appears masculine.  Chest examination was performed in the presence of a chaperone.  This revealed bilateral grade 3 ptosis.  Pectoralis muscles intact.  There is minimal sized lateral thoracic rolls bilaterally.  Notch to nipple distance is 22 cm on the right and 23 cm on the left areole or diameter is 5 cm bilaterally.  Nipple to fold distance is 10 cm bilaterally.  Base diameter is 13 cm on the right and 12 cm on the left.    ASSESSMENT: Gender dysphoria, requesting top surgery.    PLAN: I explained the need for letter of support from mental health professional.  I am also requesting a baseline screening mammogram.  With these obtained, patient is a potential candidate for bilateral simple complete mastectomy with free nipple graft reconstruction as a form of top surgery to masculinize the chest.  I explained this outpatient procedure in detail today.  I explained the risks to include bleeding, infection, injury to surrounding structures, fluid collection, nipple or nipple graft loss, nipple sensory loss, change in nipple size, wound healing difficulties, contour deformity, dog ears, asymmetry, and need for revision surgery.  Patient accepts these risks and wishes to proceed with surgery.  We will wait for letter of support.    Total time spent with patient was 30 min of which greater than 50% was in counseling.  "

## 2019-09-03 NOTE — PROGRESS NOTES
[facility]:  Care Coordination Note     SITUATION   Patient (Paxton, He/him) is a 19 year old who is receiving support for:  Clinic Care Coordination - Initial (New Top consultation)  .    BACKGROUND     New pt attended [facility] consultation with Dr. Altamirano; pt was accompanied with a friend who is also interested in top surgery.     ASSESSMENT     Surgery              [facility] Assessment  Comprehensive Gender Care ([facility]) Enrollment: Enrolled  Patient has a therapist: Yes  Name of therapist: Dunia Snow in private practice  Letter of support #1: Requested  Surgery being considered: Yes  Mastectomy: Yes          PLAN          Nursing Interventions:   [facility] program and services discussed with patient. Educational surgical packet provided and reviewed with patient. Process for accessing surgery discussed, including: WPATH standards of care, letters of support, treatment plan action steps, PA insurance process, surgery scheduling, and approximate timeline.     SILVIA consent signed by patient. Surgeon s photography outcomes reviewed with patient. Pt questions answered within scope of practice.     Follow-up plan:  Pt needs to submit one letter of support from therapist. See DR. Altamirano's note for additional F/U instructions.        Spencer Reyna

## 2019-09-05 ENCOUNTER — ANCILLARY PROCEDURE (OUTPATIENT)
Dept: MAMMOGRAPHY | Facility: CLINIC | Age: 19
End: 2019-09-05
Attending: PLASTIC SURGERY
Payer: COMMERCIAL

## 2019-09-05 DIAGNOSIS — Z12.31 VISIT FOR SCREENING MAMMOGRAM: ICD-10-CM

## 2019-09-05 PROCEDURE — 77067 SCR MAMMO BI INCL CAD: CPT | Mod: TC

## 2019-09-11 ENCOUNTER — OFFICE VISIT (OUTPATIENT)
Dept: FAMILY MEDICINE | Facility: CLINIC | Age: 19
End: 2019-09-11
Payer: COMMERCIAL

## 2019-09-11 VITALS
OXYGEN SATURATION: 98 % | HEIGHT: 62 IN | SYSTOLIC BLOOD PRESSURE: 112 MMHG | RESPIRATION RATE: 16 BRPM | TEMPERATURE: 98.6 F | HEART RATE: 89 BPM | WEIGHT: 131.2 LBS | BODY MASS INDEX: 24.14 KG/M2 | DIASTOLIC BLOOD PRESSURE: 69 MMHG

## 2019-09-11 DIAGNOSIS — Z11.3 ROUTINE SCREENING FOR STI (SEXUALLY TRANSMITTED INFECTION): Primary | ICD-10-CM

## 2019-09-11 DIAGNOSIS — F41.0 PANIC DISORDER: ICD-10-CM

## 2019-09-11 DIAGNOSIS — Z00.00 ANNUAL PHYSICAL EXAM: ICD-10-CM

## 2019-09-11 LAB
BACTERIA: NORMAL
CLUE CELLS: NORMAL
MOTILE TRICHOMONAS: NEGATIVE
ODOR: NORMAL
PH WET PREP: <4.5 (ref 3.8–4.5)
WBC WET PREP: <2 (ref 2–5)
YEAST: NORMAL

## 2019-09-11 RX ORDER — SERTRALINE HYDROCHLORIDE 25 MG/1
TABLET, FILM COATED ORAL
Qty: 90 TABLET | Refills: 3 | Status: SHIPPED | OUTPATIENT
Start: 2019-09-11 | End: 2019-10-14

## 2019-09-11 ASSESSMENT — MIFFLIN-ST. JEOR: SCORE: 1473.24

## 2019-09-11 NOTE — PROGRESS NOTES
Physical Note          HPI       Concerns today: Wants final HPV shot, had mammogram done on 9/5, seeing therapist next week for letter of support, then may schedule mastectomy after insurance approval. A little nervous about surgery. Wants screening for STI. New sexual partner (trans man - sex assigned at birth female).    Mood has been good, has not had panic attack in a while - 2 months ago.     Patient Active Problem List   Diagnosis     Gender dysphoria in adolescent and adult     Anxiety     Panic disorder     Past Medical History:   Diagnosis Date     Anxiety      Depressive disorder      Previous Medical Care    Family History   Problem Relation Age of Onset     Leukemia Paternal Grandmother      Hypertension Paternal Grandfather      Skin Cancer Other             Review of Systems:     Review of Systems:  CONSTITUTIONAL: NEGATIVE for fever, chills, change in weight  INTEGUMENTARY/SKIN: NEGATIVE for worrisome rashes, moles or lesions  EYES: NEGATIVE for vision changes or irritation  ENT/MOUTH: NEGATIVE for ear, mouth and throat problems  RESP: NEGATIVE for significant cough or SOB  BREAST: NEGATIVE for masses, tenderness or discharge  CV: NEGATIVE for chest pain, palpitations or peripheral edema  GI: NEGATIVE for nausea, abdominal pain, heartburn, or change in bowel habits  : NEGATIVE for frequency, dysuria, or hematuria  MUSCULOSKELETAL: NEGATIVE for significant arthralgias or myalgia  NEURO: NEGATIVE for weakness, dizziness or paresthesias  ENDOCRINE: NEGATIVE for temperature intolerance, skin/hair changes  HEME/ALLERGY: NEGATIVE for bleeding problems  PSYCHIATRIC: NEGATIVE for changes in mood or affect         Social History     Social History     Socioeconomic History     Marital status: Single     Spouse name: Not on file     Number of children: Not on file     Years of education: Not on file     Highest education level: Not on file   Occupational History     Not on file   Social Needs     Financial  resource strain: Not on file     Food insecurity:     Worry: Not on file     Inability: Not on file     Transportation needs:     Medical: Not on file     Non-medical: Not on file   Tobacco Use     Smoking status: Never Smoker     Smokeless tobacco: Never Used   Substance and Sexual Activity     Alcohol use: No     Drug use: No     Sexual activity: Never     Partners: Male, Female   Lifestyle     Physical activity:     Days per week: Not on file     Minutes per session: Not on file     Stress: Not on file   Relationships     Social connections:     Talks on phone: Not on file     Gets together: Not on file     Attends [employer] service: Not on file     Active member of club or organization: Not on file     Attends meetings of clubs or organizations: Not on file     Relationship status: Not on file     Intimate partner violence:     Fear of current or ex partner: Not on file     Emotionally abused: Not on file     Physically abused: Not on file     Forced sexual activity: Not on file   Other Topics Concern     Not on file   Social History Narrative     Not on file     Marital Status:Single  Who lives in your household? Lives with dad    Has anyone hurt you physically, for example by pushing, hitting, slapping or kicking you or forcing you to have sex? Denies  Do you feel threatened or controlled by a partner, ex-partner or anyone in your life? Denies    Sexual Health     Sexual concerns: No - but would like screening as he has new sexual partner  STI History: Neg  Pregnancy History: No obstetric history on file.  LMP No LMP recorded (lmp unknown). Patient has had an injection. Patient with suppressed periods as he is on testosterone.   Last Pap Smear Date: No results found for: PAP  Abnormal Pap History: None - never had before, due for pap    Recommended Screening     Pap every 3 years for women 21-29. Recommended and patient accepted testing.         Physical Exam:     Vitals: /69 (BP Location: Left arm,  "Patient Position: Sitting, Cuff Size: Adult Regular)   Pulse 89   Temp 98.6  F (37  C) (Oral)   Resp 16   Ht 1.565 m (5' 1.61\")   Wt 59.5 kg (131 lb 3.2 oz)   LMP  (LMP Unknown)   SpO2 98%   BMI 24.30 kg/m    BMI= Body mass index is 24.3 kg/m .   GENERAL: healthy, alert and no distress  EYES: Eyes grossly normal to inspection, extraocular movements - intact, and PERRL  HENT: Nose- normal; Mouth- no ulcers, no lesions  NECK: no tenderness, no adenopathy, no asymmetry, no masses, no stiffness; thyroid- normal to palpation  RESP: lungs clear to auscultation - no rales, no rhonchi, no wheezes  BREAST: declined by patient  CV: regular rates and rhythm, normal S1 S2, no S3 or S4 and no murmur, no click or rub -  ABDOMEN: soft, no tenderness, no  hepatosplenomegaly, no masses, normal bowel sounds  MS: extremities- no gross deformities noted, no edema  SKIN: no suspicious lesions, no rashes  NEURO: strength and tone- normal, sensory exam- grossly normal, mentation- intact, speech- normal  - female: cervix- normal, adnexae- normal; uterus- normal, no masses, no discharge  RECTAL- female: no masses, no hemorrhoids  PSYCH: Alert and oriented times 3; speech- coherent , normal rate and volume; able to articulate logical thoughts, able to abstract reason, no tangential thoughts, no hallucinations or delusions, affect- normal    Assessment and Plan      Paxton was seen today for physical and refill request.    Diagnoses and all orders for this visit:    Routine screening for STI (sexually transmitted infection)  -     Neisseria gonorrhoeae PCR  -     Chlamydia trachomatis PCR  -     Wet Prep (LabDAQ)  -     Treponema Abs w Reflex to RPR and Titer  -     HIV Antigen Antibody Combo  -     Pap imaged thin layer screen only - recommended age 21 - 24 years    Panic disorder  -     sertraline (ZOLOFT) 25 MG tablet; Take one tablet (25mg) by mouth daily with 50mg tablet to total 75mg daily.  -     sertraline (ZOLOFT) 50 MG " tablet; Take one tablet (50mg) by mouth daily with 25mg tablet to total 75mg daily.    Annual physical exam  -     Neisseria gonorrhoeae PCR  -     Chlamydia trachomatis PCR  -     Wet Prep (LabDAQ)  -     Treponema Abs w Reflex to RPR and Titer  -     HIV Antigen Antibody Combo  -     Pap imaged thin layer screen only - recommended age 21 - 24 years  -     HPV9 (Gardasil 9 )    1. Health Care Maintenance: Normal Physical Exam    1. Follow-up in 3 months for testosterone and mood management  2.Contraception: not needed - does not engage in sexual activity with partner who produces sperm  3. Mood doing well on higher dose of zoloft, plan to continue with 75mg daily.     Options for treatment and follow-up care were reviewed with the patient . Paxton Meredith and/or guardian engaged in the decision making process and verbalized understanding of the options discussed and agreed with the final plan.    Mindy Maria, DO

## 2019-09-11 NOTE — PROGRESS NOTES
Preceptor Attestation:   Patient seen, evaluated and discussed with the resident. I have verified the content of the note, which accurately reflects my assessment of the patient and the plan of care.   Supervising Physician:  Perla López MD

## 2019-09-12 LAB
C TRACH DNA SPEC QL NAA+PROBE: NEGATIVE
HIV 1+2 AB+HIV1 P24 AG SERPL QL IA: NONREACTIVE
N GONORRHOEA DNA SPEC QL NAA+PROBE: NEGATIVE
SPECIMEN SOURCE: NORMAL
SPECIMEN SOURCE: NORMAL
T PALLIDUM AB SER QL: NONREACTIVE

## 2019-09-13 LAB
COPATH REPORT: NORMAL
PAP: NORMAL

## 2019-10-04 ENCOUNTER — MEDICAL CORRESPONDENCE (OUTPATIENT)
Dept: HEALTH INFORMATION MANAGEMENT | Facility: CLINIC | Age: 19
End: 2019-10-04

## 2019-10-10 ENCOUNTER — PATIENT OUTREACH (OUTPATIENT)
Dept: PLASTIC SURGERY | Facility: CLINIC | Age: 19
End: 2019-10-10

## 2019-10-10 NOTE — PROGRESS NOTES
[facility]:  Care Coordination Note     SITUATION   Patient (Paxton, He/him)  is a 19 year old who is receiving support for:  Clinic Care Coordination - Follow-up (Letter of support received)  .    BACKGROUND     Pts therapist faxed letter of support, which is adequate for PA.     Pt had initial consult with Dr. Altamirano on 9/3/19.    ASSESSMENT     Surgery              [facility] Assessment  Comprehensive Gender Care ([facility]) Enrollment: Enrolled  Patient has a therapist: Yes  Name of therapist: Dunia Snow in private practice  Letter of support #1: Received  Letter #1 Date: 10/03/19  Surgery being considered: Yes  Mastectomy: Yes    Examination: Bilateral digital screening mammography with computer  aided detection, 9/5/2019 7:39 PM.     Comparison: None     History: No current breast concerns.     BREAST DENSITY: Heterogeneously dense.     COMMENTS:  No suspicious finding.                                                          IMPRESSION: BI-RADS CATEGORY: 1 -  NEGATIVE.     RECOMMENDED FOLLOW-UP: Annual Mammography.     The patient will be notified of the results.      FIDELIA HATFIELD MD      PLAN          Nursing Interventions:  Reviewed letter of support for WPATH standards of care, which is adequate for PA.       Follow-up plan:  Ready to PA.        Spencer Reyna

## 2019-10-14 DIAGNOSIS — F41.0 PANIC DISORDER: ICD-10-CM

## 2019-10-14 RX ORDER — SERTRALINE HYDROCHLORIDE 25 MG/1
TABLET, FILM COATED ORAL
Qty: 90 TABLET | Refills: 3 | Status: SHIPPED | OUTPATIENT
Start: 2019-10-14 | End: 2019-12-10

## 2019-10-14 NOTE — TELEPHONE ENCOUNTER
"Request for medication refill:sertraline (ZOLOFT) 25 MG tablet    Providers if patient needs an appointment and you are willing to give a one month supply please refill for one month and  send a letter/MyChart using \".SMILLIMITEDREFILL\" .smillimited and route chart to \"P [facility] \" (Giving one month refill in non controlled medications is strongly recommended before denial)    If refill has been denied, meaning absolutely no refills without visit, please complete the smart phrase \".smirxrefuse\" and route it to the \"P [facility] MED REFILLS\"  pool to inform the patient and the pharmacy.    Law Na, MA        "

## 2019-10-21 ENCOUNTER — TELEPHONE (OUTPATIENT)
Dept: PLASTIC SURGERY | Facility: CLINIC | Age: 19
End: 2019-10-21

## 2019-10-21 NOTE — TELEPHONE ENCOUNTER
submitted PA request online for bilateral mastectomy, [facility], patient's gender is female, are-[facility] insurance, patient's gender if male

## 2019-10-29 ENCOUNTER — TELEPHONE (OUTPATIENT)
Dept: PLASTIC SURGERY | Facility: CLINIC | Age: 19
End: 2019-10-29

## 2019-10-29 NOTE — TELEPHONE ENCOUNTER
received [facility] approval #[id number] for bilateral mastectomy, date span 10/21/19-10/20/20

## 2019-10-29 NOTE — TELEPHONE ENCOUNTER
faxed pa form and clinical to [facility], for bilateral mastectomy-2nd insurance for patient

## 2019-10-31 ENCOUNTER — TELEPHONE (OUTPATIENT)
Dept: PLASTIC SURGERY | Facility: CLINIC | Age: 19
End: 2019-10-31

## 2019-11-04 ENCOUNTER — MYC REFILL (OUTPATIENT)
Dept: FAMILY MEDICINE | Facility: CLINIC | Age: 19
End: 2019-11-04

## 2019-11-04 DIAGNOSIS — F64.0 GENDER DYSPHORIA IN ADOLESCENT AND ADULT: ICD-10-CM

## 2019-11-20 ENCOUNTER — MYC REFILL (OUTPATIENT)
Dept: FAMILY MEDICINE | Facility: CLINIC | Age: 19
End: 2019-11-20

## 2019-11-20 DIAGNOSIS — F64.0 GENDER DYSPHORIA IN ADOLESCENT AND ADULT: ICD-10-CM

## 2019-11-20 DIAGNOSIS — F64.0 GENDER DYSPHORIA IN ADULT: Primary | ICD-10-CM

## 2019-11-20 DIAGNOSIS — F64.0 GENDER DYSPHORIA IN ADULT: ICD-10-CM

## 2019-11-20 RX ORDER — CEFAZOLIN SODIUM 2 G/50ML
2 SOLUTION INTRAVENOUS
Status: CANCELLED | OUTPATIENT
Start: 2019-11-20

## 2019-11-20 RX ORDER — CEFAZOLIN SODIUM 1 G/50ML
1 INJECTION, SOLUTION INTRAVENOUS SEE ADMIN INSTRUCTIONS
Status: CANCELLED | OUTPATIENT
Start: 2019-11-20

## 2019-11-21 NOTE — TELEPHONE ENCOUNTER
Approval given for 1 mo supply and 1 refill, will need follow-up appointment for further refills.  Mindy Maria DO  PGY3 Family Medicine Resident  Pager: [phone]

## 2019-11-22 RX ORDER — TESTOSTERONE CYPIONATE 200 MG/ML
60 INJECTION, SOLUTION INTRAMUSCULAR WEEKLY
Qty: 4 ML | Refills: 1 | Status: SHIPPED | OUTPATIENT
Start: 2019-11-22 | End: 2020-03-20

## 2019-11-22 NOTE — TELEPHONE ENCOUNTER
RN printed and had preceptor Dr Morton sign. Faxed to pharmacy. Fax successful    Jessica Metz RN

## 2019-11-26 ENCOUNTER — TELEPHONE (OUTPATIENT)
Dept: PLASTIC SURGERY | Facility: CLINIC | Age: 19
End: 2019-11-26

## 2019-11-26 PROBLEM — F64.0 GENDER DYSPHORIA IN ADULT: Status: ACTIVE | Noted: 2019-11-26

## 2019-11-26 NOTE — TELEPHONE ENCOUNTER
I scheduled surgery for this patient with Dr. Altamirano on 5/28 at [facility]. Scheduled per patient request.    I will be mailing out a surgery packet.    He is aware that we may move the surgery to 5/29 if we are able to.    They are aware that they will receive a call  ~2 days prior to the scheduled procedure and will be given an exact arrival/start time.    PAC/H&P: will see PCP within 30 days of the surgery.    Post-Op: 6/9 at 4:30 PM    No further questions at this time.

## 2019-11-26 NOTE — TELEPHONE ENCOUNTER
ROMARIO and sent MyChart regarding scheduling surgery with Dr. Altamirano. Provided my callback information.

## 2019-12-10 ENCOUNTER — MYC REFILL (OUTPATIENT)
Dept: FAMILY MEDICINE | Facility: CLINIC | Age: 19
End: 2019-12-10

## 2019-12-10 DIAGNOSIS — F41.0 PANIC DISORDER: ICD-10-CM

## 2019-12-10 NOTE — TELEPHONE ENCOUNTER
"Request for medication refill: Zoloft    Providers if patient needs an appointment and you are willing to give a one month supply please refill for one month and  send a letter/MyChart using \".SMILLIMITEDREFILL\" .smillimited and route chart to \"P [facility] \" (Giving one month refill in non controlled medications is strongly recommended before denial)    If refill has been denied, meaning absolutely no refills without visit, please complete the smart phrase \".smirxrefuse\" and route it to the \"P [facility] MED REFILLS\"  pool to inform the patient and the pharmacy.    Meghan Martin RN       "

## 2019-12-12 RX ORDER — SERTRALINE HYDROCHLORIDE 25 MG/1
TABLET, FILM COATED ORAL
Qty: 90 TABLET | Refills: 3 | Status: SHIPPED | OUTPATIENT
Start: 2019-12-12 | End: 2020-03-20

## 2020-03-10 ENCOUNTER — HEALTH MAINTENANCE LETTER (OUTPATIENT)
Age: 20
End: 2020-03-10

## 2020-03-20 ENCOUNTER — VIRTUAL VISIT (OUTPATIENT)
Dept: FAMILY MEDICINE | Facility: CLINIC | Age: 20
End: 2020-03-20
Payer: COMMERCIAL

## 2020-03-20 DIAGNOSIS — F64.0 GENDER DYSPHORIA IN ADOLESCENT AND ADULT: ICD-10-CM

## 2020-03-20 DIAGNOSIS — F41.0 PANIC DISORDER: ICD-10-CM

## 2020-03-20 RX ORDER — TESTOSTERONE CYPIONATE 200 MG/ML
60 INJECTION, SOLUTION INTRAMUSCULAR WEEKLY
Qty: 4 ML | Refills: 4 | Status: SHIPPED | OUTPATIENT
Start: 2020-03-20 | End: 2020-03-20

## 2020-03-20 RX ORDER — TESTOSTERONE CYPIONATE 200 MG/ML
60 INJECTION, SOLUTION INTRAMUSCULAR WEEKLY
Qty: 4 ML | Refills: 4 | Status: SHIPPED | OUTPATIENT
Start: 2020-03-20 | End: 2020-07-17

## 2020-03-20 RX ORDER — SERTRALINE HYDROCHLORIDE 25 MG/1
TABLET, FILM COATED ORAL
Qty: 90 TABLET | Refills: 0 | Status: SHIPPED | OUTPATIENT
Start: 2020-03-20 | End: 2020-07-17

## 2020-03-20 NOTE — PROGRESS NOTES
"Family Medicine Telephone Visit Note         Telephone Visit Consent   Patient was verbally read the following and verbal consent was obtained.  \"I understand that I may revoke this request for a phone visit at any time.  This consent will automatically  3 months from the signed date and time.\"    Name person giving consent:  Patient   Date verbal consent given:  3/20/2020  Time verbal consent given:  12:59 PM      Chief Complaint   Patient presents with     RECHECK     Current Outpatient Medications   Medication Sig Dispense Refill     Needle, Disp, 25G X 5/8\" MISC Use to inject hormones weekly 25 each 2     sertraline (ZOLOFT) 25 MG tablet Take one tablet (25mg) by mouth daily with 50mg tablet to total 75mg daily. 90 tablet 0     syringe, disposable, 1 ML MISC Use to draw up hormones once weekly 25 each 3     hydrOXYzine (ATARAX) 25 MG tablet Take 1 tablet (25 mg) by mouth 3 times daily as needed for anxiety 30 tablet 1     needle, disp, 18G X 1\" MISC Use to draw up hormones once weekly 25 each 3     sertraline (ZOLOFT) 50 MG tablet Take one tablet (50mg) by mouth daily with 25mg tablet to total 75mg daily. 90 tablet 3     testosterone cypionate (DEPOTESTOSTERONE) 200 MG/ML injection Inject 0.3 mLs (60 mg) into the muscle once a week 4 mL 1     No Known Allergies           HPI   Patients name: Paxton  Appointment start time:  12:59 PM    HRT Follow Up  Patient presents for HRT follow up. Desires to go on a testosterone patch. Also reports being inconsistent with his weekly testosterone injections.  Reports appropriately alternating injection site.  Does not want to do topical testosterone as worried about potential contact with partner  Current regimen includes 0.3 ml/60 mg per week  Last injection was , though also reporting having missed his shot this week  Denies any mood concerns or vaginal bleeding   Desires to continue with HRT    Depression and Anxiety  Has been on 75 mg of " "sertraline  Reports overall doing well, Mood - \"pretty good\"  Is currently in therpay  Requesting refill for only the 25 mg of the total dosage as reports having enough of the 50 mg capsules.   Denies SI/HI   Denies auditory or visual hallucinations          Assessment and Plan   1. Gender dysphoria in adolescent and adult  Patient wanted to discuss alternative methods for HRT.  Discussed at this time that desire of patches have not been utilized for HRT due to the size of the patches and often cost.  Patient has declined topical testosterone and oral is not an option.  Discussed this time.we do not have testosterone pellets though is potential procedure we can consider in the future if available.  Patient otherwise agreeable to continue with current weekly injections. Would have patient follow up in September 2020 for annual HRT labs.   - syringe, disposable, 1 ML MISC; Use to draw up hormones once weekly  Dispense: 25 each; Refill: 3  - Needle, Disp, 25G X 5/8\" MISC; Use to inject hormones weekly  Dispense: 25 each; Refill: 2  - testosterone cypionate (DEPOTESTOSTERONE) 200 MG/ML injection; Inject 0.3 mLs (60 mg) into the muscle once a week  Dispense: 4 mL; Refill: 3    2. Panic disorder  Patient reports overall mood and anxiety has been stable on current dose of 75 mg of Zoloft.  Desires to continue this medication at this time.  Refill was sent for 25 mg with patient reporting having enough of the 50 mg.  Otherwise requested the patient reach out on [employer] if needing refill for other dosage.  Encouraged to continue with therapy at this time.  - sertraline (ZOLOFT) 25 MG tablet; Take one tablet (25mg) by mouth daily with 50mg tablet to total 75mg daily.  Dispense: 90 tablet; Refill: 0      After Visit Information:  Patient chose to view AVS via [employer]    Appointment end time: 1:16 PM  This is a telephone visit that took 17 minutes.      Clinician location:  Home, precepted with Dr. Fleming at [facility]" clinic    Karin Ngo MD  [facility] Resident PGY-3  [phone]

## 2020-03-20 NOTE — PROGRESS NOTES
Preceptor Attestation:   Patient discussed with the resident. Assessment and plan reviewed with resident and agreed upon.   Supervising Physician:  Jose L Fleming MD  [facility]

## 2020-03-23 DIAGNOSIS — F41.0 PANIC DISORDER: ICD-10-CM

## 2020-03-23 RX ORDER — HYDROXYZINE HYDROCHLORIDE 25 MG/1
25 TABLET, FILM COATED ORAL 3 TIMES DAILY PRN
Qty: 30 TABLET | Refills: 1 | Status: SHIPPED | OUTPATIENT
Start: 2020-03-23 | End: 2022-02-22

## 2020-03-23 NOTE — TELEPHONE ENCOUNTER
"Request for medication refill:Hydroxyzine     Providers if patient needs an appointment and you are willing to give a one month supply please refill for one month and  send a letter/MyChart using \".SMILLIMITEDREFILL\" .smillimited and route chart to \"P SMI \" (Giving one month refill in non controlled medications is strongly recommended before denial)    If refill has been denied, meaning absolutely no refills without visit, please complete the smart phrase \".smirxrefuse\" and route it to the \"P SMI MED REFILLS\"  pool to inform the patient and the pharmacy.    Jessica Villegas, CMA        "

## 2020-04-15 ENCOUNTER — TELEPHONE (OUTPATIENT)
Dept: PLASTIC SURGERY | Facility: CLINIC | Age: 20
End: 2020-04-15

## 2020-04-15 NOTE — TELEPHONE ENCOUNTER
submitted pa request online for bilateral mastectomy-[facility] insurance, ref#[id number]

## 2020-04-16 ENCOUNTER — TELEPHONE (OUTPATIENT)
Dept: PLASTIC SURGERY | Facility: CLINIC | Age: 20
End: 2020-04-16

## 2020-04-16 NOTE — TELEPHONE ENCOUNTER
[facility] no pa is needed for [address] [place] [address] insurance, already have an approved PA, use that one

## 2020-04-16 NOTE — TELEPHONE ENCOUNTER
Called and left message explainng that we are cancelling surgery with Dr. Altamirano on 5/28 due to the COVID-19 concerns. We are also cancelling any post-op appointment scheduled.     I stated that we will call when we are able to reschedule as we are not able to do so at this time.    Noted that I am not a nurse and cannot answer any medical questions regarding this and to contact the RN with medical questions.    Provided my direct number.

## 2020-04-16 NOTE — TELEPHONE ENCOUNTER
"----- Message from Lakeshia Sotomayor sent at 4/16/2020  5:14 PM CDT -----  Shawn Palmer,    This patient left me a voicemail with a few questions that I am unsure how to answer appropriately because of the way he worded it.    He confirmed surgery being canceled.     His question is why did we cancel? Is it because of COVID and the unknowns or is it because of what our governor said/decided to not have surgeries with it being \"not important or non-essential\". He is curious as to what the reason is.    I did not state anything in my voicemail to him about essential surgeries, I said that we have been directed to cancel surgeries through may due to everything happening with COVID and the unknowns. I also stated that we unfortunately do not have a timeframe to provide of when we will reschedule.    He stated that he is sad because he has been waiting for a long time, and he said he \"kind of\" understands that we want to protect him from getting sick.    I am adding our new moisés-op to this message because I am training her, but I haven't ran into this question.     He said we can leave him a VM with our answer.    Lakeshia    "

## 2020-04-17 ENCOUNTER — PATIENT OUTREACH (OUTPATIENT)
Dept: PLASTIC SURGERY | Facility: CLINIC | Age: 20
End: 2020-04-17

## 2020-04-17 NOTE — PROGRESS NOTES
Pt called and LVM with Lakeshia, surgery scheduler in response to his cancelled May 2020 surgery. Pt asked for additional rational for cancelled surgery - he OK'd VM explination.    Called pt back, LVM with rational and offered to discuss further.     Spencer Reyna, [facility]  Transgender Care Coordinator

## 2020-06-15 DIAGNOSIS — F41.0 PANIC DISORDER: ICD-10-CM

## 2020-06-15 NOTE — TELEPHONE ENCOUNTER
"Request for medication refill:  Sertraline  Providers if patient needs an appointment and you are willing to give a one month supply please refill for one month and  send a letter/MyChart using \".SMILLIMITEDREFILL\" .smillimited and route chart to \"P SMI \" (Giving one month refill in non controlled medications is strongly recommended before denial)    If refill has been denied, meaning absolutely no refills without visit, please complete the smart phrase \".smirxrefuse\" and route it to the \"P SMI MED REFILLS\"  pool to inform the patient and the pharmacy.    Jessica Villegas, CMA        "

## 2020-07-14 ENCOUNTER — TELEPHONE (OUTPATIENT)
Dept: PLASTIC SURGERY | Facility: CLINIC | Age: 20
End: 2020-07-14

## 2020-07-14 DIAGNOSIS — Z11.59 ENCOUNTER FOR SCREENING FOR OTHER VIRAL DISEASES: Primary | ICD-10-CM

## 2020-07-14 NOTE — TELEPHONE ENCOUNTER
Surgery is scheduled with Dr. Altamirano on 7/31 at [facility].  Scheduled per opening.    H&P: to be completed by PCP OR PAC.    POST-OP: 8/6 with Rosalind.    Pre-op consult with surgeon 7/21.     Pt is aware that they will be contacted to schedule a COVID-19 test within 3 days of surgery.    They are aware that they will receive a call  ~2 days prior to the scheduled procedure and will be given an exact arrival/start time or get their finalized times at their appointment with PAC.    I contacted the patient and left a voicemail to confirm the scheduled dates.

## 2020-07-17 ENCOUNTER — OFFICE VISIT (OUTPATIENT)
Dept: FAMILY MEDICINE | Facility: CLINIC | Age: 20
End: 2020-07-17
Payer: COMMERCIAL

## 2020-07-17 VITALS
HEART RATE: 79 BPM | DIASTOLIC BLOOD PRESSURE: 70 MMHG | OXYGEN SATURATION: 98 % | WEIGHT: 152 LBS | SYSTOLIC BLOOD PRESSURE: 109 MMHG | HEIGHT: 62 IN | BODY MASS INDEX: 27.97 KG/M2 | TEMPERATURE: 98.3 F | RESPIRATION RATE: 16 BRPM

## 2020-07-17 DIAGNOSIS — Z00.00 HEALTHCARE MAINTENANCE: ICD-10-CM

## 2020-07-17 DIAGNOSIS — N93.9 VAGINAL SPOTTING: ICD-10-CM

## 2020-07-17 DIAGNOSIS — F41.0 PANIC DISORDER: ICD-10-CM

## 2020-07-17 DIAGNOSIS — Z01.818 PREOP GENERAL PHYSICAL EXAM: ICD-10-CM

## 2020-07-17 DIAGNOSIS — F64.0 GENDER DYSPHORIA IN ADULT: Primary | ICD-10-CM

## 2020-07-17 RX ORDER — SERTRALINE HYDROCHLORIDE 25 MG/1
TABLET, FILM COATED ORAL
Qty: 90 TABLET | Refills: 3 | Status: SHIPPED | OUTPATIENT
Start: 2020-07-17 | End: 2021-02-04

## 2020-07-17 RX ORDER — TESTOSTERONE CYPIONATE 200 MG/ML
60 INJECTION, SOLUTION INTRAMUSCULAR WEEKLY
Qty: 4 ML | Refills: 5 | Status: SHIPPED | OUTPATIENT
Start: 2020-07-17 | End: 2020-11-11

## 2020-07-17 ASSESSMENT — ENCOUNTER SYMPTOMS
FEVER: 0
PALPITATIONS: 0
SHORTNESS OF BREATH: 0
LIGHT-HEADEDNESS: 0
DYSURIA: 0
HEMATURIA: 0
VOMITING: 0
RHINORRHEA: 1
NUMBNESS: 0
ABDOMINAL PAIN: 1
HEADACHES: 0
NAUSEA: 1
CHILLS: 0
BACK PAIN: 0
WEAKNESS: 0
DIZZINESS: 0
SORE THROAT: 0
COUGH: 1

## 2020-07-17 ASSESSMENT — PATIENT HEALTH QUESTIONNAIRE - PHQ9: SUM OF ALL RESPONSES TO PHQ QUESTIONS 1-9: 11

## 2020-07-17 ASSESSMENT — MIFFLIN-ST. JEOR: SCORE: 1573.72

## 2020-07-17 NOTE — PROGRESS NOTES
Preceptor Attestation:    Patient seen and evaluated in person. I discussed the patient with the resident. I have verified the content of the note, which accurately reflects my assessment of the patient and the plan of care.   Supervising Physician:  Natalya Araiza MD.

## 2020-07-17 NOTE — PROGRESS NOTES
HPI       Paxton Meredith is a 19 year old  who presents for   Chief Complaint   Patient presents with     Abnormal Bleeding Problem     x's 5 days ago some vaginal spotting started. Lower abdominal cramping for 3 days. Had a menses for 5 days with cramping 2months- he did miss a few Testosterone shots     Refill Request     Testosterone and Zoloft 25 and 50mng      Patient is a 19-year-old with history of gender dysphoria on long-term testosterone treatment, as well as history of panic disorder and anxiety on sertraline 75 mg daily.  Patient feels the sertraline has provided good support for mental health, and feels things are going quite well right now.  PHQ 9 score today is 11, which is improved from prior.  Question 9 was negative.    Patient presents today primarily to discuss abnormal vaginal eating.  For 3 days in a row within the last week, patient had abdominal cramping, and vaginal spotting.  This resolved 2 days ago.  He did have a little bit of pink vaginal discharge when wiping after using the restroom earlier today though.  Has not had return of abdominal cramping.  At the time of cramping, he also had nausea, but no vomiting.  He does note to the last time this happened was about 2 months ago when he missed a dose of testosterone.  He does report that about 3 weeks ago, he switched from shots on Wednesday to shots on Friday, going 2 days extra between doses during that time.    As a separate issue, patient is scheduled on  of this year to have top surgery with Dr. Altamirano.  Patient has no history of blood clots, has previously had anesthesia during a wisdom tooth extraction, without issues.  No family history of personal history of malignant hyperthermia.    PRE-OP EVALUATION:  Today's date: 2020    Paxton Meredith (: 2000) presents for pre-operative evaluation assessment as requested by Dr. Altamirano.  He requires evaluation and anesthesia risk assessment prior to  "undergoing surgery/procedure for treatment of gender dysphoria.    Primary Physician: Alison Boyd  Type of Anesthesia Anticipated: to be determined    Patient has a Health Care Directive or Living Will:  NO     +++++++  Problem, Medication and Allergy Lists were   reviewed and updated if needed.     Patient Active Problem List    Diagnosis Date Noted     Gender dysphoria in adult 11/26/2019     Priority: Medium     Added automatically from request for surgery [id number]       Panic disorder 08/13/2019     Priority: Medium     Gender dysphoria in adolescent and adult 10/19/2016     Priority: Medium     Anxiety 10/19/2016     Priority: Medium         Current Outpatient Medications   Medication Sig Dispense Refill     hydrOXYzine (ATARAX) 25 MG tablet Take 1 tablet (25 mg) by mouth 3 times daily as needed for anxiety 30 tablet 1     needle, disp, 18G X 1\" MISC Use to draw up hormones once weekly 25 each 3     Needle, Disp, 25G X 5/8\" MISC Use to inject hormones weekly 25 each 2     sertraline (ZOLOFT) 25 MG tablet Take one tablet (25mg) by mouth daily with 50mg tablet to total 75mg daily. 90 tablet 3     sertraline (ZOLOFT) 50 MG tablet Take one tablet (50mg) by mouth daily with 25mg tablet to total 75mg daily. 90 tablet 3     syringe, disposable, 1 ML MISC Use to draw up hormones once weekly 25 each 3     testosterone cypionate (DEPOTESTOSTERONE) 200 MG/ML injection Inject 0.3 mLs (60 mg) into the muscle once a week 4 mL 4       No Known Allergies.  Latex Allergy: NO  Patient is an established patient of this clinic..         Review of Systems:   Review of Systems   Constitutional: Negative for chills and fever.   HENT: Positive for congestion and rhinorrhea (seasonal allergies). Negative for sore throat.    Eyes: Negative for visual disturbance.   Respiratory: Positive for cough (resolved). Negative for shortness of breath.    Cardiovascular: Positive for chest pain. Negative for palpitations and leg swelling. " "  Gastrointestinal: Positive for abdominal pain (with cramping, lower abdomen; gone now, still some spotting) and nausea (intermittent, resolved). Negative for vomiting.   Genitourinary: Negative for dysuria and hematuria.        Positive for spotting, resolved now   Musculoskeletal: Negative for back pain.   Skin: Negative for rash.   Neurological: Negative for dizziness, weakness, light-headedness, numbness and headaches.            Physical Exam:     Vitals:    07/17/20 1651   BP: 109/70   Pulse: 79   Resp: 16   Temp: 98.3  F (36.8  C)   TempSrc: Oral   SpO2: 98%   Weight: 68.9 kg (152 lb)   Height: 1.575 m (5' 2\")     Body mass index is 27.8 kg/m .  Vitals were reviewed and were normal     Physical Exam     GENERAL APPEARANCE: healthy, alert and no distress     EYES: EOMI, PERRL     HENT: ear canals and TM's normal and nose and mouth without ulcers or lesions     NECK: no adenopathy, no asymmetry, masses, or scars and thyroid normal to palpation     RESP: lungs clear to auscultation - no rales, rhonchi or wheezes     CV: regular rates and rhythm, normal S1 S2, no S3 or S4 and no murmur, click or rub     ABDOMEN:  soft, nontender, no HSM or masses and bowel sounds normal     MS: extremities normal- no gross deformities noted, no evidence of inflammation in joints, FROM in all extremities.     SKIN: no suspicious lesions or rashes     NEURO: Normal strength and tone, sensory exam grossly normal, mentation intact and speech normal     PSYCH: mentation appears normal. and affect normal/bright     LYMPHATICS: No cervical adenopathy      Results:   Results are ordered and pending    Assessment and Plan        Paxton was seen today for abnormal bleeding problem and refill request.    Diagnoses and all orders for this visit:    Gender dysphoria in adult  Vaginal spotting  Patient is a 19-year-old who is on long-term testosterone.  Patient had vaginal spotting for 3 days, starting about 5 days ago.  Spotting was " associated with lower abdominal cramping and nausea.  Symptoms have since resolved.  Did have a little bit of pink discharge on wiping during using the restroom earlier today, but no return of symptoms.  Patient had menses about 2 months ago after having missed a dose of testosterone.  Patient does note that about 3 weeks ago, he transitioned from having shots on Wednesdays to having them on Fridays.  This allowed for 2 days of extra time between doses.  On exam today, no abdominal tenderness or concerns.  I suspect that the vaginal spotting is likely due to the 2 extra days between doses.  Patient is due for refill of testosterone, which is given today.  In addition, he is due for his annual labs, which are ordered as future labs to be done at any [place] location.  He will monitor for any other abnormal symptoms.    -     CBC with platelets differential; Future  -     Testosterone total; Future  -     Comprehensive metabolic panel; Future  -     Lipid panel; Future    Panic disorder  Patient feels well on sertraline 75 mg.  PHQ 9 today is 11, which is improved from prior.  Refill of his medications are given to him today.  -     sertraline (ZOLOFT) 50 MG tablet; Take one tablet (50mg) by mouth daily with 25mg tablet to total 75mg daily.  -     sertraline (ZOLOFT) 25 MG tablet; Take one tablet (25mg) by mouth daily with 50mg tablet to total 75mg daily.    Healthcare maintenance  Updated vaccinations today. Given tdap.  -     TDAP VACCINE (BOOSTRIX)    Preop general physical exam  Reason for surgery/procedure: gender dysphoria  Diagnosis/reason for consult: Gender dysphoria  The proposed surgical procedure is considered LOW risk.  REVISED CARDIAC RISK INDEX  The patient has the following serious cardiovascular risks for perioperative complications such as (MI, PE, VFib and 3  AV Block):  No serious cardiac risks  INTERPRETATION: 0 risks: Class I (very low risk - 0.4% complication rate)  The patient has the  following additional risks for perioperative complications:  No identified additional risks  --Patient is to take all scheduled medications on the day of surgery  APPROVAL GIVEN to proceed with proposed procedure, without further diagnostic evaluation         There are no discontinued medications.    Options for treatment and follow-up care were reviewed with the patient. aPxton Meredith  engaged in the decision making process and verbalized understanding of the options discussed and agreed with the final plan.    Melia Gonzalez MD  [address] [place] [address],  [address]  [place] [address]  [phone]  Dept: [phone]

## 2020-07-17 NOTE — PATIENT INSTRUCTIONS
Here is the plan from today's visit    1. Panic disorder  - sertraline (ZOLOFT) 50 MG tablet; Take one tablet (50mg) by mouth daily with 25mg tablet to total 75mg daily.  Dispense: 90 tablet; Refill: 3  - sertraline (ZOLOFT) 25 MG tablet; Take one tablet (25mg) by mouth daily with 50mg tablet to total 75mg daily.  Dispense: 90 tablet; Refill: 3      Please call or return to clinic if your symptoms don't go away.    Follow up plan  Please make a clinic appointment for follow up with me (FERNANDO GONZALEZ) or PCP as needed.   Thank you for coming to [facility] today.  Lab Testing:  **If you had lab testing today and your results are reassuring or normal they will be mailed to you or sent through [employer] within 7 days.   **If the lab tests need quick action we will call you with the results.  The phone number we will call with results is # [phone] (home) . If this is not the best number please call our clinic and change the number.  Medication Refills:  If you need any refills please call your pharmacy and they will contact us.   If you need to  your refill at a new pharmacy, please contact the new pharmacy directly. The new pharmacy will help you get your medications transferred faster.   Scheduling:  If you have any concerns about today's visit or wish to schedule another appointment please call our office during normal business hours [phone] (8-5:00 M-F)  If a referral was made to a [facility] and you don't get a call from central scheduling please call [phone].  If a Mammogram was ordered for you at [facility] call [phone] to schedule or change your appointment.  If you had an XRay/CT/Ultrasound/MRI ordered the number is [phone] to schedule or change your radiology appointment.   Medical Concerns:  If you have urgent medical concerns please call [phone] at any time of the day.    Fernando Gonzalez MD    Before Your Surgery      Call your  surgeon if there is any change in your health. This includes signs of a cold or flu (such as a sore throat, runny nose, cough, rash or fever).    Do not smoke, drink alcohol or take over the counter medicine (unless your surgeon or primary care doctor tells you to) for the 24 hours before and after surgery.    If you take prescribed drugs: Follow your doctor s orders about which medicines to take and which to stop until after surgery.    Eating and drinking prior to surgery: follow the instructions from your surgeon    Take a shower or bath the night before surgery. Use the soap your surgeon gave you to gently clean your skin. If you do not have soap from your surgeon, use your regular soap. Do not shave or scrub the surgery site.  Wear clean pajamas and have clean sheets on your bed.

## 2020-07-21 ENCOUNTER — VIRTUAL VISIT (OUTPATIENT)
Dept: PLASTIC SURGERY | Facility: CLINIC | Age: 20
End: 2020-07-21
Payer: COMMERCIAL

## 2020-07-21 VITALS — BODY MASS INDEX: 27.6 KG/M2 | HEIGHT: 62 IN | WEIGHT: 150 LBS

## 2020-07-21 DIAGNOSIS — F64.0 GENDER DYSPHORIA IN ADULT: Primary | ICD-10-CM

## 2020-07-21 ASSESSMENT — PAIN SCALES - GENERAL: PAINLEVEL: NO PAIN (0)

## 2020-07-21 ASSESSMENT — MIFFLIN-ST. JEOR: SCORE: 1564.65

## 2020-07-21 NOTE — NURSING NOTE
"Chief Complaint   Patient presents with     RECHECK     pre-op prema mast DOS 7/31, H&P 7/17       Vitals:    07/21/20 1213   Weight: 68 kg (150 lb)   Height: 1.575 m (5' 2\")       Body mass index is 27.44 kg/m .    Jeremy Luevano, EMT    "

## 2020-07-21 NOTE — LETTER
"2020       RE: Paxton Meredith  [address]  [facility] [address]     Dear Colleague,    Thank you for referring your patient, Paxton Meredith, to the [facility] PLASTIC AND RECONSTRUCTIVE SURGERY at [facility]. Please see a copy of my visit note below.    Paxton Meredith is a 19 year old adult who is being evaluated via a billable video visit.        Patient returns for a preoperative visit prior to undergoing top surgery for gender dysphoria.    INTERVAL HISTORY: Patient reports he is ready for surgery.  Surgery scheduled for .  Since our last visit together, he has quit working at [employer].  He is now working with students as an .    PHYSICAL EXAMINATION:  Ht 1.575 m (5' 2\")   Wt 68 kg (150 lb)   BMI 27.44 kg/m    General: No acute distress.  Appears masculine.  Chest examination was deferred.    IMAGIN2019 mammogram showed BI-RADS CATEGORY 1.    ASSESSMENT: Gender dysphoria, candidate for top surgery in the form of bilateral mastectomy with free nipple grafting.    PLAN: We went over the surgical details again.  I explained the risks to include bleeding, infection, injury to surrounding structures, fluid collection, nipple or nipple graft loss, nipple sensory loss, change in nipple size, wound healing difficulties, contour deformity, dog ears, asymmetry, and need for revision surgery.  Patient accepts the associated risks and wishes to proceed with surgery.    Total time spent with patient was 15 min of which greater than 50% was in counseling.      Video-Visit Details    Type of service:  Video Visit    Video Start Time: 12:25 PM  Video End Time: 12:40 PM    Originating Location (pt. Location): Other work.    Distant Location (provider location):  [facility] PLASTIC AND RECONSTRUCTIVE SURGERY     Platform used for Video Visit: Jasvir Altamirano MD            "

## 2020-07-21 NOTE — PROGRESS NOTES
"Paxton Meredith is a 19 year old adult who is being evaluated via a billable video visit.      The patient has been notified of following:     \"This video visit will be conducted via a call between you and your physician/provider. We have found that certain health care needs can be provided without the need for an in-person physical exam.  This service lets us provide the care you need with a video conversation.  If a prescription is necessary we can send it directly to your pharmacy.  If lab work is needed we can place an order for that and you can then stop by our lab to have the test done at a later time.    Video visits are billed at different rates depending on your insurance coverage.  Please reach out to your insurance provider with any questions.    If during the course of the call the physician/provider feels a video visit is not appropriate, you will not be charged for this service.\"    Patient has given verbal consent for Video visit? Yes  How would you like to obtain your AVS? MyChart  If you are dropped from the video visit, the video invite should be resent to: Text to cell phone: [phone]  Will anyone else be joining your video visit? No        Patient returns for a preoperative visit prior to undergoing top surgery for gender dysphoria.    INTERVAL HISTORY: Patient reports he is ready for surgery.  Surgery scheduled for .  Since our last visit together, he has quit working at [employer].  He is now working with students as an .    PHYSICAL EXAMINATION:  Ht 1.575 m (5' 2\")   Wt 68 kg (150 lb)   BMI 27.44 kg/m    General: No acute distress.  Appears masculine.  Chest examination was deferred.    [email or web address] mammogram showed BI-RADS CATEGORY 1.    ASSESSMENT: Gender dysphoria, candidate for top surgery in the form of bilateral mastectomy with free nipple grafting.    PLAN: We went over the surgical details again.  I explained the risks to include " bleeding, infection, injury to surrounding structures, fluid collection, nipple or nipple graft loss, nipple sensory loss, change in nipple size, wound healing difficulties, contour deformity, dog ears, asymmetry, and need for revision surgery.  Patient accepts the associated risks and wishes to proceed with surgery.    Total time spent with patient was 15 min of which greater than 50% was in counseling.      Video-Visit Details    Type of service:  Video Visit    Video Start Time: 12:25 PM  Video End Time: 12:40 PM    Originating Location (pt. Location): Other work.    Distant Location (provider location):  [facility] PLASTIC [facility] RECON[facility]IVE SURGERY     Platform used for Video Visit: Jasvir Altamirano MD

## 2020-07-29 DIAGNOSIS — Z11.59 ENCOUNTER FOR SCREENING FOR OTHER VIRAL DISEASES: ICD-10-CM

## 2020-07-29 PROCEDURE — U0003 INFECTIOUS AGENT DETECTION BY NUCLEIC ACID (DNA OR RNA); SEVERE ACUTE RESPIRATORY SYNDROME CORONAVIRUS 2 (SARS-COV-2) (CORONAVIRUS DISEASE [COVID-19]), AMPLIFIED PROBE TECHNIQUE, MAKING USE OF HIGH THROUGHPUT TECHNOLOGIES AS DESCRIBED BY CMS-2020-01-R: HCPCS | Performed by: PLASTIC SURGERY

## 2020-07-30 ENCOUNTER — ANESTHESIA EVENT (OUTPATIENT)
Dept: SURGERY | Facility: CLINIC | Age: 20
End: 2020-07-30
Payer: COMMERCIAL

## 2020-07-30 LAB
SARS-COV-2 RNA SPEC QL NAA+PROBE: NOT DETECTED
SPECIMEN SOURCE: NORMAL

## 2020-07-31 ENCOUNTER — ANCILLARY PROCEDURE (OUTPATIENT)
Dept: ULTRASOUND IMAGING | Facility: CLINIC | Age: 20
End: 2020-07-31
Payer: COMMERCIAL

## 2020-07-31 ENCOUNTER — HOSPITAL ENCOUNTER (OUTPATIENT)
Facility: CLINIC | Age: 20
Discharge: HOME | End: 2020-07-31
Attending: PLASTIC SURGERY | Admitting: PLASTIC SURGERY
Payer: COMMERCIAL

## 2020-07-31 ENCOUNTER — ANESTHESIA (OUTPATIENT)
Dept: SURGERY | Facility: CLINIC | Age: 20
End: 2020-07-31
Payer: COMMERCIAL

## 2020-07-31 VITALS
BODY MASS INDEX: 27.18 KG/M2 | DIASTOLIC BLOOD PRESSURE: 69 MMHG | RESPIRATION RATE: 16 BRPM | TEMPERATURE: 98.2 F | OXYGEN SATURATION: 96 % | HEART RATE: 96 BPM | HEIGHT: 62 IN | WEIGHT: 147.71 LBS | SYSTOLIC BLOOD PRESSURE: 109 MMHG

## 2020-07-31 DIAGNOSIS — F64.0 GENDER DYSPHORIA IN ADULT: ICD-10-CM

## 2020-07-31 LAB
ABO + RH BLD: NORMAL
ABO + RH BLD: NORMAL
BLD GP AB SCN SERPL QL: NORMAL
BLOOD BANK CMNT PATIENT-IMP: NORMAL
CREAT SERPL-MCNC: 1.07 MG/DL (ref 0.66–1.25)
GFR SERPL CREATININE-BSD FRML MDRD: >90 ML/MIN/{1.73_M2}
HCG SERPL QL: NEGATIVE
HGB BLD-MCNC: 16.8 G/DL (ref 13.3–17.7)
POTASSIUM SERPL-SCNC: 3.6 MMOL/L (ref 3.4–5.3)
SPECIMEN EXP DATE BLD: NORMAL

## 2020-07-31 PROCEDURE — 36000057 ZZH SURGERY LEVEL 3 1ST 30 MIN - UMMC: Performed by: PLASTIC SURGERY

## 2020-07-31 PROCEDURE — 25000128 H RX IP 250 OP 636

## 2020-07-31 PROCEDURE — 25800030 ZZH RX IP 258 OP 636: Performed by: PLASTIC SURGERY

## 2020-07-31 PROCEDURE — 25000128 H RX IP 250 OP 636: Performed by: NURSE ANESTHETIST, CERTIFIED REGISTERED

## 2020-07-31 PROCEDURE — 25000128 H RX IP 250 OP 636: Performed by: PLASTIC SURGERY

## 2020-07-31 PROCEDURE — 25000128 H RX IP 250 OP 636: Performed by: ANESTHESIOLOGY

## 2020-07-31 PROCEDURE — 71000014 ZZH RECOVERY PHASE 1 LEVEL 2 FIRST HR: Performed by: PLASTIC SURGERY

## 2020-07-31 PROCEDURE — 36415 COLL VENOUS BLD VENIPUNCTURE: CPT | Performed by: SURGERY

## 2020-07-31 PROCEDURE — 86850 RBC ANTIBODY SCREEN: CPT | Performed by: SURGERY

## 2020-07-31 PROCEDURE — 25000125 ZZHC RX 250: Performed by: NURSE ANESTHETIST, CERTIFIED REGISTERED

## 2020-07-31 PROCEDURE — 25000566 ZZH SEVOFLURANE, EA 15 MIN: Performed by: PLASTIC SURGERY

## 2020-07-31 PROCEDURE — 88305 TISSUE EXAM BY PATHOLOGIST: CPT | Performed by: PLASTIC SURGERY

## 2020-07-31 PROCEDURE — 36000059 ZZH SURGERY LEVEL 3 EA 15 ADDTL MIN UMMC: Performed by: PLASTIC SURGERY

## 2020-07-31 PROCEDURE — 84132 ASSAY OF SERUM POTASSIUM: CPT | Performed by: SURGERY

## 2020-07-31 PROCEDURE — 71000027 ZZH RECOVERY PHASE 2 EACH 15 MINS: Performed by: PLASTIC SURGERY

## 2020-07-31 PROCEDURE — 71000015 ZZH RECOVERY PHASE 1 LEVEL 2 EA ADDTL HR: Performed by: PLASTIC SURGERY

## 2020-07-31 PROCEDURE — 25800030 ZZH RX IP 258 OP 636

## 2020-07-31 PROCEDURE — 37000009 ZZH ANESTHESIA TECHNICAL FEE, EACH ADDTL 15 MIN: Performed by: PLASTIC SURGERY

## 2020-07-31 PROCEDURE — 25800030 ZZH RX IP 258 OP 636: Performed by: NURSE ANESTHETIST, CERTIFIED REGISTERED

## 2020-07-31 PROCEDURE — 86901 BLOOD TYPING SEROLOGIC RH(D): CPT | Performed by: SURGERY

## 2020-07-31 PROCEDURE — 85018 HEMOGLOBIN: CPT | Performed by: SURGERY

## 2020-07-31 PROCEDURE — 27210794 ZZH OR GENERAL SUPPLY STERILE: Performed by: PLASTIC SURGERY

## 2020-07-31 PROCEDURE — 86900 BLOOD TYPING SEROLOGIC ABO: CPT | Performed by: SURGERY

## 2020-07-31 PROCEDURE — 40000170 ZZH STATISTIC PRE-PROCEDURE ASSESSMENT II: Performed by: PLASTIC SURGERY

## 2020-07-31 PROCEDURE — 84703 CHORIONIC GONADOTROPIN ASSAY: CPT | Performed by: SURGERY

## 2020-07-31 PROCEDURE — 25000125 ZZHC RX 250: Performed by: PLASTIC SURGERY

## 2020-07-31 PROCEDURE — 25000125 ZZHC RX 250

## 2020-07-31 PROCEDURE — 82565 ASSAY OF CREATININE: CPT | Performed by: SURGERY

## 2020-07-31 PROCEDURE — 37000008 ZZH ANESTHESIA TECHNICAL FEE, 1ST 30 MIN: Performed by: PLASTIC SURGERY

## 2020-07-31 RX ORDER — FENTANYL CITRATE 50 UG/ML
INJECTION, SOLUTION INTRAMUSCULAR; INTRAVENOUS PRN
Status: DISCONTINUED | OUTPATIENT
Start: 2020-07-31 | End: 2020-07-31

## 2020-07-31 RX ORDER — BUPIVACAINE HYDROCHLORIDE AND EPINEPHRINE 5; 5 MG/ML; UG/ML
INJECTION, SOLUTION PERINEURAL PRN
Status: DISCONTINUED | OUTPATIENT
Start: 2020-07-31 | End: 2020-07-31

## 2020-07-31 RX ORDER — LIDOCAINE HYDROCHLORIDE 20 MG/ML
INJECTION, SOLUTION INFILTRATION; PERINEURAL PRN
Status: DISCONTINUED | OUTPATIENT
Start: 2020-07-31 | End: 2020-07-31

## 2020-07-31 RX ORDER — CEFAZOLIN SODIUM 1 G/3ML
1 INJECTION, POWDER, FOR SOLUTION INTRAMUSCULAR; INTRAVENOUS SEE ADMIN INSTRUCTIONS
Status: DISCONTINUED | OUTPATIENT
Start: 2020-07-31 | End: 2020-07-31 | Stop reason: HOSPADM

## 2020-07-31 RX ORDER — LIDOCAINE 40 MG/G
CREAM TOPICAL
Status: DISCONTINUED | OUTPATIENT
Start: 2020-07-31 | End: 2020-07-31 | Stop reason: HOSPADM

## 2020-07-31 RX ORDER — ONDANSETRON 2 MG/ML
INJECTION INTRAMUSCULAR; INTRAVENOUS PRN
Status: DISCONTINUED | OUTPATIENT
Start: 2020-07-31 | End: 2020-07-31

## 2020-07-31 RX ORDER — NALOXONE HYDROCHLORIDE 0.4 MG/ML
.1-.4 INJECTION, SOLUTION INTRAMUSCULAR; INTRAVENOUS; SUBCUTANEOUS
Status: DISCONTINUED | OUTPATIENT
Start: 2020-07-31 | End: 2020-07-31 | Stop reason: HOSPADM

## 2020-07-31 RX ORDER — PROPOFOL 10 MG/ML
INJECTION, EMULSION INTRAVENOUS PRN
Status: DISCONTINUED | OUTPATIENT
Start: 2020-07-31 | End: 2020-07-31

## 2020-07-31 RX ORDER — SODIUM CHLORIDE, SODIUM LACTATE, POTASSIUM CHLORIDE, CALCIUM CHLORIDE 600; 310; 30; 20 MG/100ML; MG/100ML; MG/100ML; MG/100ML
INJECTION, SOLUTION INTRAVENOUS CONTINUOUS
Status: DISCONTINUED | OUTPATIENT
Start: 2020-07-31 | End: 2020-07-31 | Stop reason: HOSPADM

## 2020-07-31 RX ORDER — MINERAL OIL
OIL (ML) MISCELLANEOUS PRN
Status: DISCONTINUED | OUTPATIENT
Start: 2020-07-31 | End: 2020-07-31 | Stop reason: HOSPADM

## 2020-07-31 RX ORDER — ESMOLOL HYDROCHLORIDE 10 MG/ML
INJECTION INTRAVENOUS PRN
Status: DISCONTINUED | OUTPATIENT
Start: 2020-07-31 | End: 2020-07-31

## 2020-07-31 RX ORDER — OXYCODONE HYDROCHLORIDE 5 MG/1
5 TABLET ORAL
Status: DISCONTINUED | OUTPATIENT
Start: 2020-07-31 | End: 2020-07-31 | Stop reason: HOSPADM

## 2020-07-31 RX ORDER — BUPIVACAINE HYDROCHLORIDE 2.5 MG/ML
INJECTION, SOLUTION EPIDURAL; INFILTRATION; INTRACAUDAL PRN
Status: DISCONTINUED | OUTPATIENT
Start: 2020-07-31 | End: 2020-07-31

## 2020-07-31 RX ORDER — DEXAMETHASONE SODIUM PHOSPHATE 4 MG/ML
INJECTION, SOLUTION INTRA-ARTICULAR; INTRALESIONAL; INTRAMUSCULAR; INTRAVENOUS; SOFT TISSUE PRN
Status: DISCONTINUED | OUTPATIENT
Start: 2020-07-31 | End: 2020-07-31

## 2020-07-31 RX ORDER — ONDANSETRON 2 MG/ML
4 INJECTION INTRAMUSCULAR; INTRAVENOUS EVERY 30 MIN PRN
Status: DISCONTINUED | OUTPATIENT
Start: 2020-07-31 | End: 2020-07-31 | Stop reason: HOSPADM

## 2020-07-31 RX ORDER — ONDANSETRON 4 MG/1
4 TABLET, ORALLY DISINTEGRATING ORAL
Status: DISCONTINUED | OUTPATIENT
Start: 2020-07-31 | End: 2020-07-31 | Stop reason: HOSPADM

## 2020-07-31 RX ORDER — HYDROXYZINE HYDROCHLORIDE 25 MG/1
25 TABLET, FILM COATED ORAL
Status: DISCONTINUED | OUTPATIENT
Start: 2020-07-31 | End: 2020-07-31 | Stop reason: HOSPADM

## 2020-07-31 RX ORDER — LABETALOL 20 MG/4 ML (5 MG/ML) INTRAVENOUS SYRINGE
10
Status: DISCONTINUED | OUTPATIENT
Start: 2020-07-31 | End: 2020-07-31 | Stop reason: HOSPADM

## 2020-07-31 RX ORDER — ONDANSETRON 4 MG/1
4 TABLET, ORALLY DISINTEGRATING ORAL EVERY 30 MIN PRN
Status: DISCONTINUED | OUTPATIENT
Start: 2020-07-31 | End: 2020-07-31 | Stop reason: HOSPADM

## 2020-07-31 RX ORDER — HYDROMORPHONE HYDROCHLORIDE 1 MG/ML
.3-.5 INJECTION, SOLUTION INTRAMUSCULAR; INTRAVENOUS; SUBCUTANEOUS EVERY 5 MIN PRN
Status: DISCONTINUED | OUTPATIENT
Start: 2020-07-31 | End: 2020-07-31 | Stop reason: HOSPADM

## 2020-07-31 RX ORDER — FLUMAZENIL 0.1 MG/ML
0.2 INJECTION, SOLUTION INTRAVENOUS
Status: DISCONTINUED | OUTPATIENT
Start: 2020-07-31 | End: 2020-07-31 | Stop reason: HOSPADM

## 2020-07-31 RX ORDER — OXYCODONE HYDROCHLORIDE 5 MG/1
5-10 TABLET ORAL EVERY 6 HOURS PRN
Qty: 26 TABLET | Refills: 0 | Status: SHIPPED | OUTPATIENT
Start: 2020-07-31 | End: 2020-10-06

## 2020-07-31 RX ORDER — FENTANYL CITRATE 50 UG/ML
25-50 INJECTION, SOLUTION INTRAMUSCULAR; INTRAVENOUS
Status: DISCONTINUED | OUTPATIENT
Start: 2020-07-31 | End: 2020-07-31 | Stop reason: HOSPADM

## 2020-07-31 RX ORDER — CEFAZOLIN SODIUM 2 G/100ML
INJECTION, SOLUTION INTRAVENOUS PRN
Status: DISCONTINUED | OUTPATIENT
Start: 2020-07-31 | End: 2020-07-31

## 2020-07-31 RX ORDER — CEFAZOLIN SODIUM 2 G/100ML
2 INJECTION, SOLUTION INTRAVENOUS
Status: DISCONTINUED | OUTPATIENT
Start: 2020-07-31 | End: 2020-07-31 | Stop reason: HOSPADM

## 2020-07-31 RX ORDER — DEXAMETHASONE SODIUM PHOSPHATE 10 MG/ML
INJECTION, SOLUTION INTRAMUSCULAR; INTRAVENOUS PRN
Status: DISCONTINUED | OUTPATIENT
Start: 2020-07-31 | End: 2020-07-31

## 2020-07-31 RX ORDER — ACETAMINOPHEN 325 MG/1
650 TABLET ORAL
Status: DISCONTINUED | OUTPATIENT
Start: 2020-07-31 | End: 2020-07-31 | Stop reason: HOSPADM

## 2020-07-31 RX ORDER — AMOXICILLIN 250 MG
1-2 CAPSULE ORAL 2 TIMES DAILY
Qty: 30 TABLET | Refills: 0 | Status: SHIPPED | OUTPATIENT
Start: 2020-07-31 | End: 2020-10-06

## 2020-07-31 RX ADMIN — HYDROMORPHONE HYDROCHLORIDE 0.4 MG: 1 INJECTION, SOLUTION INTRAMUSCULAR; INTRAVENOUS; SUBCUTANEOUS at 17:36

## 2020-07-31 RX ADMIN — SODIUM CHLORIDE, POTASSIUM CHLORIDE, SODIUM LACTATE AND CALCIUM CHLORIDE: 600; 310; 30; 20 INJECTION, SOLUTION INTRAVENOUS at 15:10

## 2020-07-31 RX ADMIN — CEFAZOLIN 2 G: 10 INJECTION, POWDER, FOR SOLUTION INTRAVENOUS at 13:23

## 2020-07-31 RX ADMIN — PHENYLEPHRINE HYDROCHLORIDE 100 MCG: 10 INJECTION INTRAVENOUS at 15:34

## 2020-07-31 RX ADMIN — PHENYLEPHRINE HYDROCHLORIDE 100 MCG: 10 INJECTION INTRAVENOUS at 14:26

## 2020-07-31 RX ADMIN — HYDROMORPHONE HYDROCHLORIDE 0.3 MG: 1 INJECTION, SOLUTION INTRAMUSCULAR; INTRAVENOUS; SUBCUTANEOUS at 17:11

## 2020-07-31 RX ADMIN — PHENYLEPHRINE HYDROCHLORIDE 100 MCG: 10 INJECTION INTRAVENOUS at 14:23

## 2020-07-31 RX ADMIN — PHENYLEPHRINE HYDROCHLORIDE 100 MCG: 10 INJECTION INTRAVENOUS at 14:59

## 2020-07-31 RX ADMIN — DEXMEDETOMIDINE HYDROCHLORIDE 40 MCG: 100 INJECTION, SOLUTION INTRAVENOUS at 12:45

## 2020-07-31 RX ADMIN — SUGAMMADEX 200 MG: 100 INJECTION, SOLUTION INTRAVENOUS at 16:27

## 2020-07-31 RX ADMIN — LIDOCAINE HYDROCHLORIDE 100 MG: 20 INJECTION, SOLUTION INFILTRATION; PERINEURAL at 13:08

## 2020-07-31 RX ADMIN — ROCURONIUM BROMIDE 20 MG: 10 INJECTION INTRAVENOUS at 14:22

## 2020-07-31 RX ADMIN — SODIUM CHLORIDE, POTASSIUM CHLORIDE, SODIUM LACTATE AND CALCIUM CHLORIDE: 600; 310; 30; 20 INJECTION, SOLUTION INTRAVENOUS at 12:57

## 2020-07-31 RX ADMIN — BUPIVACAINE HYDROCHLORIDE AND EPINEPHRINE BITARTRATE 40 ML: 5; .005 INJECTION, SOLUTION EPIDURAL; INTRACAUDAL; PERINEURAL at 12:45

## 2020-07-31 RX ADMIN — CEFAZOLIN 1 G: 1 INJECTION, POWDER, FOR SOLUTION INTRAMUSCULAR; INTRAVENOUS at 15:30

## 2020-07-31 RX ADMIN — DEXAMETHASONE SODIUM PHOSPHATE 2 MG: 10 INJECTION, SOLUTION INTRAMUSCULAR; INTRAVENOUS at 12:45

## 2020-07-31 RX ADMIN — PHENYLEPHRINE HYDROCHLORIDE 200 MCG: 10 INJECTION INTRAVENOUS at 16:06

## 2020-07-31 RX ADMIN — BUPIVACAINE HYDROCHLORIDE 20 ML: 2.5 INJECTION, SOLUTION EPIDURAL; INFILTRATION; INTRACAUDAL; PERINEURAL at 12:45

## 2020-07-31 RX ADMIN — PHENYLEPHRINE HYDROCHLORIDE 200 MCG: 10 INJECTION INTRAVENOUS at 15:49

## 2020-07-31 RX ADMIN — ROCURONIUM BROMIDE 50 MG: 10 INJECTION INTRAVENOUS at 13:09

## 2020-07-31 RX ADMIN — FENTANYL CITRATE 50 MCG: 50 INJECTION INTRAMUSCULAR; INTRAVENOUS at 12:39

## 2020-07-31 RX ADMIN — ESMOLOL HYDROCHLORIDE 30 MG: 10 INJECTION, SOLUTION INTRAVENOUS at 14:08

## 2020-07-31 RX ADMIN — MIDAZOLAM 1 MG: 1 INJECTION INTRAMUSCULAR; INTRAVENOUS at 12:39

## 2020-07-31 RX ADMIN — DEXAMETHASONE SODIUM PHOSPHATE 6 MG: 4 INJECTION, SOLUTION INTRA-ARTICULAR; INTRALESIONAL; INTRAMUSCULAR; INTRAVENOUS; SOFT TISSUE at 13:57

## 2020-07-31 RX ADMIN — FENTANYL CITRATE 50 MCG: 50 INJECTION, SOLUTION INTRAMUSCULAR; INTRAVENOUS at 13:55

## 2020-07-31 RX ADMIN — PROPOFOL 100 MG: 10 INJECTION, EMULSION INTRAVENOUS at 13:09

## 2020-07-31 RX ADMIN — ONDANSETRON 4 MG: 2 INJECTION INTRAMUSCULAR; INTRAVENOUS at 16:13

## 2020-07-31 RX ADMIN — PHENYLEPHRINE HYDROCHLORIDE 100 MCG: 10 INJECTION INTRAVENOUS at 15:15

## 2020-07-31 RX ADMIN — FENTANYL CITRATE 200 MCG: 50 INJECTION, SOLUTION INTRAMUSCULAR; INTRAVENOUS at 13:08

## 2020-07-31 ASSESSMENT — MIFFLIN-ST. JEOR: SCORE: 1554.25

## 2020-07-31 NOTE — ANESTHESIA PREPROCEDURE EVALUATION
"Anesthesia Pre-Procedure Evaluation    Patient: Paxton Meredith   MRN:     [id number] Gender:   adult   Age:    19 year old :      2000        Preoperative Diagnosis: Gender dysphoria in adult [F64.0]   Procedure(s):  Bilateral mastectomy with free nipple grafting     LABS:  CBC:   Lab Results   Component Value Date    HGB 16.8 2019    HGB 16.1 (H) 11/15/2018    HCT 54.6 (H) 2019    HCT 51.5 (H) 11/15/2018     BMP:   Lab Results   Component Value Date    GLC 76.0 11/15/2018    GLC 96.0 2018     COAGS: No results found for: PTT, INR, FIBR  POC: No results found for: BGM, HCG, HCGS  OTHER:   Lab Results   Component Value Date    A1C 5.2 11/10/2016    PROTTOTAL 7.4 11/15/2018    ALT 9.5 11/15/2018    AST 13.0 11/15/2018    ALKPHOS 52.4 11/15/2018    BILITOTAL 0.5 11/15/2018        Preop Vitals    BP Readings from Last 3 Encounters:   20 109/70   19 112/69   19 118/75    Pulse Readings from Last 3 Encounters:   20 79   19 89   19 116      Resp Readings from Last 3 Encounters:   20 16   19 16   19 16    SpO2 Readings from Last 3 Encounters:   20 98%   19 98%   19 98%      Temp Readings from Last 1 Encounters:   20 36.8  C (98.3  F) (Oral)    Ht Readings from Last 1 Encounters:   20 1.575 m (5' 2\")      Wt Readings from Last 1 Encounters:   20 68 kg (150 lb)    Estimated body mass index is 27.44 kg/m  as calculated from the following:    Height as of 20: 1.575 m (5' 2\").    Weight as of 20: 68 kg (150 lb).     LDA:        Past Medical History:   Diagnosis Date     Anxiety      Depressive disorder       History reviewed. No pertinent surgical history.   No Known Allergies     Anesthesia Evaluation     .             ROS/MED HX    ENT/Pulmonary:  - neg pulmonary ROS     Neurologic:  - neg neurologic ROS     Cardiovascular:  - neg cardiovascular ROS       METS/Exercise Tolerance:     Hematologic: "  - neg hematologic  ROS       Musculoskeletal:  - neg musculoskeletal ROS       GI/Hepatic:  - neg GI/hepatic ROS       Renal/Genitourinary:  - ROS Renal section negative       Endo:  - neg endo ROS       Psychiatric:     (+) psychiatric history anxiety, depression and other (comment) (gender dysphoria; panic d/o)      Infectious Disease:  - neg infectious disease ROS       Malignancy:      - no malignancy   Other:                         PHYSICAL EXAM:   Mental Status/Neuro: A/A/O   Airway: Facies: Feasible  Mallampati: I  Mouth/Opening: Full  TM distance: > 6 cm  Neck ROM: Full   Respiratory: Auscultation: CTAB     Resp. Rate: Normal     Resp. Effort: Normal      CV: Rhythm: Regular  Rate: Age appropriate  Heart: Normal Sounds  Edema: None   Comments:      Dental: Normal Dentition                Assessment:   ASA SCORE: 2    H&P: History and physical reviewed and following examination; no interval change.   Smoking Status:  Non-Smoker/Unknown   NPO Status: NPO Appropriate     Plan:   Anes. Type:  General   Pre-Medication: None   Induction:  IV (Standard)   Airway: ETT; Oral   Access/Monitoring: PIV   Maintenance: Balanced     Postop Plan:   Postop Pain: Opioids  Postop Sedation/Airway: Not planned     PONV Management:   Adult Risk Factors:, Non-Smoker, Postop Opioids   Prevention: Ondansetron, Dexamethasone     CONSENT: Direct conversation   Plan and risks discussed with: Patient   Blood Products: Consent Deferred (Minimal Blood Loss)                   Fritz Guerra MD

## 2020-07-31 NOTE — ANESTHESIA CARE TRANSFER NOTE
Patient: Paxton Meredith    Procedure(s):  Bilateral mastectomy with free nipple grafting    Diagnosis: Gender dysphoria in adult [F64.0]  Diagnosis Additional Information: No value filed.    Anesthesia Type:   General     Note:  Airway :Nasal Cannula  Patient transferred to:PACU  Comments: To PACU with CRNA, RN. Pt lethargic, easily arousable, VSS on O2 per NC. Report and care to PACU RNHandoff Report: Identifed the Patient, Identified the Reponsible Provider, Reviewed the pertinent medical history, Discussed the surgical course, Reviewed Intra-OP anesthesia mangement and issues during anesthesia, Set expectations for post-procedure period and Allowed opportunity for questions and acknowledgement of understanding      Vitals: (Last set prior to Anesthesia Care Transfer)    CRNA VITALS  7/31/2020 1607 - 7/31/2020 1651      7/31/2020             NIBP:  105/57    Pulse:  98    NIBP Mean:  75    SpO2:  100 %    Resp Rate (observed):  (!) 4                Electronically Signed By: ZACHARY Hillman CRNA  July 31, 2020  4:51 PM

## 2020-07-31 NOTE — DISCHARGE INSTRUCTIONS
POST-OPERATIVE INSTRUCTIONS    Instructions    *Have someone drive you home after surgery and help you at home for 1-2     days.    *Get plenty of rest and follow balanced diet.      *Decreased activity may promote constipation, so you may want to add more raw             fruit to your diet, and be sure to increase fluid intake. Take pain medication as                  prescribed.    *Do not take aspirin or any products containing aspirin unless approved by your                surgeon.    *Do not drink alcohol when taking pain medications.    *Even when not taking pain medications, no alcohol for 3 weeks as it      causes fluid retention.    *If you are taking vitamins with iron, resume these as tolerated.    *Do not smoke, as smoking delays healing and increases the risk of     complications.    Activities   *Start walking as soon as possible, this helps to reduce swelling and     lowers the chance of blood clots.    *Do not drive until you are no longer taking any pain medications     (narcotics).   *No lifting more than a gallon of milk (5 lbs) for 3 weeks   *Do not drive until you have full range of motion with your arms.   *Refrain from vigorous activity for 4 weeks   *Restrict excessive use of arms for at least 5-7 days.   *Refrain from physical contact with breasts for 2 weeks.     *Body contact sports should be avoided for 6-8 weeks.   *Social and employment activities can be resumed in 3-10 days.    Incision Care   *You may shower in 48 hours, if you do not have drainage tubes   *Drainage tubes have been placed, you may not shower until 48 hours after        the drains are removed   *Keep surgical tape on until it peels off.   *Keep incisions clean and inspect daily for signs of infection.   *No tub soaking, bathing, or swimming while sutures or drains are in place.   *You may pad the incisions with gauze for comfort.   *Wear garments (ace wrap, taping) as directed by surgeon.   *Always use a strong  sunblock, if sun exposure is unavoidable (SPF 50 or     greater).    What to Expect   *Expect some drainage onto the surgical tape covering the incisions.   *You are likely to feel tired for a few days, but you should be up and      around in 4-5 days.   *Maximum discomfort will occur in the first few days after surgery.   *You may experience some numbness of nipples and operative areas.   *You may experience a burning sensation in your nipples for about 2     weeks.   *You may experience temporary soreness, tightness, swelling and bruising     as well as some discomfort in the incision area.      Appearance   *Most of the discoloration and swelling will subside in 4-6 weeks.   *Scars may be red and angry looking for 6 months. In time, these usually     soften and fade.    Follow-Up Care   *Sutures will be dissolvable. They are under your skin and released at the     end of each incision. They are clear in appearance and will be trimmed to     the skin line in 1-2 weeks.    Please note my office will call you 1-2 business days after your procedure to check up on how you're doing and to schedule your post-operative appointment.     When to Call:   * If you have increased swelling or bruising.   *If swelling and redness persist for a few days.   *If you have increased redness along the incision.  If you have severe or increased pain   not relieved by medication.   *If you have any side effects to medications; such as, rash, nausea,      headache, vomiting or constipation.   *If you have an oral temperature over 100.4 degrees.   *If you have any yellowish or greenish drainage from the incisions or      notice a foul odor.   *If you have bleeding from the incisions that is difficult to control with      light pressure.   *If you develop increased pain in your calves, shortness of breath, or chest     pain.    *If you develop any symptoms of concern.     For Medical Questions, Please Call:     [phone],  Monday -  Friday, 8 a.m. - 4:30 p.m.     After hours and on weekends, call Hospital Paging at [phone] and     ask for the Plastic Surgeon on call.    [facility], [place]  Same-Day Surgery   Adult Discharge Orders & Instructions     For 24 hours after surgery    1. Get plenty of rest.  A responsible adult must stay with you for at least 24 hours after you leave the hospital.   2. Do not drive or use heavy equipment.  If you have weakness or tingling, don't drive or use heavy equipment until this feeling goes away.  3. Do not drink alcohol.  4. Avoid strenuous or risky activities.  Ask for help when climbing stairs.   5. You may feel lightheaded.  IF so, sit for a few minutes before standing.  Have someone help you get up.   6. If you have nausea (feel sick to your stomach): Drink only clear liquids such as apple juice, ginger ale, broth or 7-Up.  Rest may also help.  Be sure to drink enough fluids.  Move to a regular diet as you feel able.  7. You may have a slight fever. Call the doctor if your fever is over 100 F (37.7 C) (taken under the tongue) or lasts longer than 24 hours.  8. You may have a dry mouth, a sore throat, muscle aches or trouble sleeping.  These should go away after 24 hours.  9. Do not make important or legal decisions.   Call your doctor for any of the followin.  Signs of infection (fever, growing tenderness at the surgery site, a large amount of drainage or bleeding, severe pain, foul-smelling drainage, redness, swelling).    2. It has been over 8 to 10 hours since surgery and you are still not able to urinate (pass water).    3.  Headache for over 24 hours.    4.  Numbness, tingling or weakness the day after surgery (if you had spinal anesthesia).  To contact a doctor, call Dr. Altamirano's Office at  [phone] or:        [phone] and ask for the resident on call for Plastic Surgery (answered 24 hours a day)      Emergency Department:    [facility]:  [phone]       (TTY for hearing impaired: [phone])    [facility]: [phone]       (TTY for hearing impaired: [phone])

## 2020-07-31 NOTE — BRIEF OP NOTE
[facility], [place]    Brief Operative Note    Pre-operative diagnosis: Gender dysphoria in adult [F64.0]  Post-operative diagnosis Same as pre-operative diagnosis    Procedure: Procedure(s):  Bilateral mastectomy with free nipple grafting  Surgeon: Surgeon(s) and Role:     * Mono Altamirano MD - Primary     * John Atkins MD - Resident - Assisting  Anesthesia: Combined General with Block   Estimated blood loss: Less than 50 ml  Drains: Von-Prattx2  Specimens:   ID Type Source Tests Collected by Time Destination   A : RIGHT Breast Tissue Tissue Breast, Right SURGICAL PATHOLOGY EXAM Mono Altamirano MD 7/31/2020  2:28 PM    B : LEFT breast tissue Tissue Breast, Left SURGICAL PATHOLOGY EXAM Mono Altamirano MD 7/31/2020  2:45 PM      Findings:  614 g removed from right breast. 730 g removed from left breast.  Complications: None.  Implants: * No implants in log *    Discharge to home    John Atkins MD  Plastic Surgery

## 2020-07-31 NOTE — ANESTHESIA POSTPROCEDURE EVALUATION
Anesthesia POST Procedure Evaluation    Patient: Paxton Meredith   MRN:     [id number] Gender:   adult   Age:    19 year old :      2000        Preoperative Diagnosis: Gender dysphoria in adult [F64.0]   Procedure(s):  Bilateral mastectomy with free nipple grafting   Postop Comments: No value filed.     Anesthesia Type: General, Peripheral Nerve Block       Disposition: Outpatient   Postop Pain Control: Uneventful            Sign Out: Well controlled pain   PONV: No   Neuro/Psych: Uneventful            Sign Out: Acceptable/Baseline neuro status   Airway/Respiratory: Uneventful            Sign Out: Acceptable/Baseline resp. status   CV/Hemodynamics: Uneventful            Sign Out: Acceptable CV status   Other NRE: NONE   DID A NON-ROUTINE EVENT OCCUR? No         Last Anesthesia Record Vitals:  CRNA VITALS  2020 1607 - 2020 1707      2020             NIBP:  105/57    Pulse:  98    NIBP Mean:  75    SpO2:  100 %    Resp Rate (observed):  (!) 4          Last PACU Vitals:  Vitals Value Taken Time   /66 2020  5:40 PM   Temp 36.5  C (97.7  F) 2020  5:15 PM   Pulse 102 2020  5:40 PM   Resp 7 2020  5:43 PM   SpO2 98 % 2020  5:43 PM   Temp src     NIBP     Pulse     SpO2     Resp     Temp     Ht Rate     Temp 2     Vitals shown include unvalidated device data.      Electronically Signed By: Pavel Kumari MD, 2020, 5:45 PM

## 2020-07-31 NOTE — ANESTHESIA PROCEDURE NOTES
Peripheral Nerve Block Procedure Note  Staff -   Anesthesiologist:  Andrea Koch DO  Resident/Fellow: Gordon Zamarripa MD    Performed By: anesthesiologist and with residents  Procedure performed by resident/CRNA in presence of a teaching physician.        Location: Pre-op  Procedure Start/Stop TImes:      7/31/2020 12:35 PM     7/31/2020 12:50 PM    patient identified, IV checked, site marked, risks and benefits discussed, informed consent, monitors and equipment checked, pre-op evaluation, at physician/surgeon's request and post-op pain management      Correct Patient: Yes      Correct Position: Yes      Correct Site: Yes      Correct Procedure: Yes      Correct Laterality:  Yes    Site Marked:  Yes  Procedure details:     Procedure:  Pectoralis    ASA:  2    Diagnosis:  Postoperative pain relief    Laterality:  Bilateral    Position:  Supine    Sterile Prep: chloraprep, patient draped, mask and sterile gloves      Local skin infiltration:  None    Needle:  Insulated    Needle gauge:  21    Needle length (mm):  110    Ultrasound: Yes      Ultrasound used to identify targeted nerve, plexus, or vascular structure and placed a needle adjacent to it      Permanent Image entered into patiient's record      Abnormal pain on injection: No      Blood Aspirated: No      Paresthesias:  No    Bleeding at site: No      Bolus via:  Needle    Infusion Method:  Single Shot    Complications:  None  Assessment/Narrative:     Injection made incrementally with aspirations every (mL):  5

## 2020-07-31 NOTE — OP NOTE
DATE OF OPERATION: July 31, 2020    PREOPERATIVE DIAGNOSIS: Gender dysphoria.    POSTOPERATIVE DIAGNOSIS: Gender dysphoria.    PROCEDURES PERFORMED:   1.  Bilateral simple complete mastectomy.  2.  Bilateral nipple reconstruction with free nipple grafts, size 2 x 2 cm each.    SURGEON: Mono Altamirano MD    ASSISTANT: John Atkins MD    ANESTHESIA: General with bilateral pectoralis blocks.    ESTIMATED BLOOD LOSS: 50 mL.    SPECIMENS: Bilateral breast tissue. Right breast tissue weight 614 g. Left breast tissue weight 730 g.    COMPLICATIONS: None.    DRAINS: Two 15 Khmer drains, one in each chest.    IMPLANTS: None.    INDICATIONS: Patient is a 19-year-old trans-man who prefers he him pronouns. Patient received a letter of support for top surgery. Patient transitioned 4 years ago. Imaging showed no evidence of breast malignancy. Patient met WPATH guidelines for top surgery. Risks, benefits, and alternatives were discussed regarding bilateral mastectomy with free nipple graft reconstruction as a form of top surgery to masculinize the chest. Patient accepted the associated risks and wished to proceed with surgery.    DESCRIPTION OF PROCEDURE: Informed consent was obtained in the preoperative holding area. The chest was then marked along the midline, bilateral mammary lines, inframammary folds, breast footprint, and the proposed superior breast incision. Patient then underwent bilateral pectoralis blocks with Anesthesia. Patient was then taken to the operating room and placed in a supine position. Preoperative antibiotics were given, bilateral lower leg SCD's were placed, and general anesthesia was obtained. All pressure points were padded and arms placed on arm boards. The chest was then prepped and draped in a sterile fashion. A time out was performed.    We started on the right side. 90 cc of 1:1,000,000 epinephrine solution was injected into the superior subcutaneous tissue for hemostasis and also to delineate the  breast capsule. A 2 x 2 cm nipple graft was excised sharply and stored in a saline moist gauze. The proposed incisions were made with a scalpel and carried down into the subcutaneous tissue with bovie cautery. From the inframammary fold incision, the breast tissue was lifted off the chest wall up to the preoperatively marked breast footprint using bovie cautery with care taken to leave the pectoralis fascia and prepectoral fat intact. Then through the superior incision the interval between the breast capsule and subcutaneous tissue was sharply dissected using facelift scissors. Dissection was carried medially to the sternum, superiorly to the clavicle, laterally to the axilla and lateral border of pectoralis, and inferiorly to the inframammary fold. The breast tissue was then removed in total to include its axillary tail of Roland and weighed. The inferior and lateral chest subcutaneous tissue and dermis was then tacked down in an interrupted fashion to the underlying rib periosteum using 2-0 PDS suture to create a masculine flat appearance to the side of the chest. The incision was then temporarily closed with staples. The exact same procedure was then performed on the left side.    Patient was then transitioned to a sitting position. Repeat excision was performed as needed. Contour was found to be flat and symmetric. New nipple locations were chosen just medial to the lateral border of the pectoralis muscles and oval shaped 2 x 1.5 cm nipples were marked. Symmetry of these were confirmed with notch to nipple and midline to nipple measurements. Patient was then placed back into a supine position. The new nipple markings were de epithelialized sharply. This created a 2 x 2 cm wound bed due to tension. The temporary staples were removed. 15 french drains were placed, one in each chest pocket, entering through the hair bearing region of the axillae. These were sewn in. Hemostasis was achieved and wounds were  irrigated. Hemostatic powder was applied to the wound beds. Incisions were closed in layers using Monocryl suture. Nipple grafts were aggressively thinned with a pair of scissors. These were then placed over the de epithelialized areas with 5-0 fast absorbing gut running suture. Xeroform and mineral oil soaked cotton ball bolsters were placed over these with 3-0 chromic sutures. All counts were correct at the end of the case. A compression dressing was applied to the chest. Patient was then extubated, awakened, and transferred to the postoperative area in stable condition.    DISPOSITION: Home.

## 2020-08-05 LAB — COPATH REPORT: NORMAL

## 2020-08-07 ENCOUNTER — ALLIED HEALTH/NURSE VISIT (OUTPATIENT)
Dept: SURGERY | Facility: CLINIC | Age: 20
End: 2020-08-07
Payer: COMMERCIAL

## 2020-08-07 DIAGNOSIS — Z98.890 POSTOPERATIVE STATE: Primary | ICD-10-CM

## 2020-08-07 NOTE — PATIENT INSTRUCTIONS
Care of Nipples and Chest Incisions    Change nipple dressings daily.  Take dressings off before or after showering. No direct shower spray on nipple grafts for 4 weeks from surgery.     Cut vaseline gauze to nipple size and place over nipple.  Place folded white gauze over vaseline gauze and cover with plastic dressing.  Do dressing changes for 2 more weeks from today. If you continue to have drainage, continue the dressings until drainage stops.     Keep compression on for 2 more weeks from today. No lifting greater than 5 lbs for 4 weeks from your surgery. Begin moisturizing your incisions with any non-scented, non-glittered lotion 3 weeks after your surgery date. Then you may peel off the incisional tape. Then apply silicone gel sheets to incisions.  These can be purchased on [employer] and at [employer].     At one month post op, baseline shoulder motion should return. You may start to exercise lightly at this time, gradually moving up to arm movement. Full shoulder motion should return by 8 weeks.      Schedule a post op appt with Dr. lAtamirano in 8 weeks out at the pod in front of office.     When to call:  Sudden increase of swelling or pain on one side  Uncontrolled pain despite pain medication  Worsening of chest swelling   Separation of nipple from chest skin  Redness and warmth in chest area  Fever > 101     Contact the RN between 8-3:30 Mon-Fri with questions or concerns through my chart message via your doctor's name (most efficient) or call at [phone].      For urgent medical issues that cannot wait, call [phone] Mon-Fri 8:-4:30.     After hours, weekends or holidays, call [phone] and ask to speak to the on call plastic surgeon fellow.

## 2020-08-07 NOTE — PROGRESS NOTES
Pt. comes into clinic today at the request of Dr. Mono Altamirano.    This service provided today was under the supervising provider of the day Dr. Scott, who was available if needed.    Reason for visit: Post op visit.  Pt returns one week after bilateral mastectomy with free nipple grafts.    Nipple grafts:  Nipple bolsters removed-good adherence to skin    Incisions:  Well approximated, no drainage, no redness  Pain:  Denies- using Tylenol occasionally  Drains: Bilateral CLOVIS drains < 20 ml for several days.  Both removed.  Instructions:  See AVS  Return to clinic:  See Dr. Altamirano in 8 weeks    Rosalind Kaplan, RN  Care Coordinator

## 2020-09-19 ENCOUNTER — MYC REFILL (OUTPATIENT)
Dept: FAMILY MEDICINE | Facility: CLINIC | Age: 20
End: 2020-09-19

## 2020-09-19 DIAGNOSIS — F64.0 GENDER DYSPHORIA IN ADOLESCENT AND ADULT: ICD-10-CM

## 2020-10-06 ENCOUNTER — OFFICE VISIT (OUTPATIENT)
Dept: PLASTIC SURGERY | Facility: CLINIC | Age: 20
End: 2020-10-06
Payer: COMMERCIAL

## 2020-10-06 VITALS
HEIGHT: 62 IN | HEART RATE: 83 BPM | OXYGEN SATURATION: 100 % | DIASTOLIC BLOOD PRESSURE: 76 MMHG | SYSTOLIC BLOOD PRESSURE: 118 MMHG | TEMPERATURE: 98.5 F | BODY MASS INDEX: 27.02 KG/M2

## 2020-10-06 DIAGNOSIS — F64.0 GENDER DYSPHORIA IN ADULT: Primary | ICD-10-CM

## 2020-10-06 PROCEDURE — 99024 POSTOP FOLLOW-UP VISIT: CPT | Performed by: PLASTIC SURGERY

## 2020-10-06 RX ORDER — SYRINGE WITH NEEDLE, 1 ML 25GX5/8"
SYRINGE, EMPTY DISPOSABLE MISCELLANEOUS
COMMUNITY
Start: 2020-09-21 | End: 2023-01-27

## 2020-10-06 ASSESSMENT — PAIN SCALES - GENERAL: PAINLEVEL: NO PAIN (0)

## 2020-10-06 NOTE — LETTER
"10/6/2020       RE: Paxton Meredith  [address]  [facility] [address]     Dear Colleague,    Thank you for referring your patient, Paxton Meredith, to the [facility] PLASTIC AND RECONSTRUCTIVE SURGERY CLINIC [place] at [facility]. Please see a copy of my visit note below.    Patient returns for a postoperative follow-up after undergoing top surgery for gender dysphoria.    INTERVAL HISTORY: Patient noticed bilateral lateral dogears, but are not affected by them.  He is happy that he had surgery.  Is doing some lotion and massage to the scars.    PHYSICAL EXAMINATION:  /76 (BP Location: Left arm, Patient Position: Chair, Cuff Size: Adult Regular)   Pulse 83   Temp 98.5  F (36.9  C) (Oral)   Ht 1.575 m (5' 2\")   SpO2 100%   BMI 27.02 kg/m    Chest examination was performed in the presence of a chaperone.  This revealed well-healed incisions and nipple grafts.  Scars are somewhat erythematous and thickened.  Otherwise, contour is flat and patient appears very masculine.    ASSESSMENT: 2.5 months status post bilateral mastectomy with free nipple graft reconstruction as a form of top surgery for gender dysphoria.    PLAN: Patient may continue scar management for another 2 months.  No activity restrictions other than avoiding prolonged stretching exercises.  Patient may return to see me in about a year as needed for follow-up visit.    Total time spent with patient was 15 min of which greater than 50% was in counseling.      Again, thank you for allowing me to participate in the care of your patient.      Sincerely,    Mono Altamirano MD      "

## 2020-10-06 NOTE — LETTER
"10/6/2020       RE: Paxton Meredith  [address]  [facility] [address]     Dear Colleague,    Thank you for referring your patient, Paxton Meredith, to the [facility] PLASTIC AND RECONSTRUCTIVE SURGERY CLINIC [place] at [facility]. Please see a copy of my visit note below.    Patient returns for a postoperative follow-up after undergoing top surgery for gender dysphoria.    INTERVAL HISTORY: Patient noticed bilateral lateral dogears, but are not affected by them.  He is happy that he had surgery.  Is doing some lotion and massage to the scars.    PHYSICAL EXAMINATION:  /76 (BP Location: Left arm, Patient Position: Chair, Cuff Size: Adult Regular)   Pulse 83   Temp 98.5  F (36.9  C) (Oral)   Ht 1.575 m (5' 2\")   SpO2 100%   BMI 27.02 kg/m    Chest examination was performed in the presence of a chaperone.  This revealed well-healed incisions and nipple grafts.  Scars are somewhat erythematous and thickened.  Otherwise, contour is flat and patient appears very masculine.    ASSESSMENT: 2.5 months status post bilateral mastectomy with free nipple graft reconstruction as a form of top surgery for gender dysphoria.    PLAN: Patient may continue scar management for another 2 months.  No activity restrictions other than avoiding prolonged stretching exercises.  Patient may return to see me in about a year as needed for follow-up visit.    Total time spent with patient was 15 min of which greater than 50% was in counseling.    Again, thank you for allowing me to participate in the care of your patient.  Sincerely,    Mono Altamirano MD  "

## 2020-10-06 NOTE — NURSING NOTE
"Chief Complaint   Patient presents with     RECHECK     8wk post op, prema mast DOS 7/31       Vitals:    10/06/20 1219   BP: 118/76   BP Location: Left arm   Patient Position: Chair   Cuff Size: Adult Regular   Pulse: 83   Temp: 98.5  F (36.9  C)   TempSrc: Oral   SpO2: 100%   Height: 1.575 m (5' 2\")       Body mass index is 27.02 kg/m .    Jeremy Luevano, EMT    "

## 2020-10-06 NOTE — PROGRESS NOTES
"Patient returns for a postoperative follow-up after undergoing top surgery for gender dysphoria.    INTERVAL HISTORY: Patient noticed bilateral lateral dogears, but are not affected by them.  He is happy that he had surgery.  Is doing some lotion and massage to the scars.    PHYSICAL EXAMINATION:  /76 (BP Location: Left arm, Patient Position: Chair, Cuff Size: Adult Regular)   Pulse 83   Temp 98.5  F (36.9  C) (Oral)   Ht 1.575 m (5' 2\")   SpO2 100%   BMI 27.02 kg/m    Chest examination was performed in the presence of a chaperone.  This revealed well-healed incisions and nipple grafts.  Scars are somewhat erythematous and thickened.  Otherwise, contour is flat and patient appears very masculine.    ASSESSMENT: 2.5 months status post bilateral mastectomy with free nipple graft reconstruction as a form of top surgery for gender dysphoria.    PLAN: Patient may continue scar management for another 2 months.  No activity restrictions other than avoiding prolonged stretching exercises.  Patient may return to see me in about a year as needed for follow-up visit.    Total time spent with patient was 15 min of which greater than 50% was in counseling.  "

## 2020-10-29 NOTE — TELEPHONE ENCOUNTER
received Fly isaac approval#[id number], [facility] insurance, for codes 36134/46770 with date span 10/29/19-10/28/20  
[FreeTextEntry1] : CT Chest 7/29/20: Mild global cardiomegaly. Lung parenchyma was clear. No effusions. \par \par PFT 9/3/20: Spirometry was normal. Moderate restriction. Mild response to bronchodilator noted.

## 2020-11-11 ENCOUNTER — MYC REFILL (OUTPATIENT)
Dept: FAMILY MEDICINE | Facility: CLINIC | Age: 20
End: 2020-11-11

## 2020-11-11 DIAGNOSIS — F64.0 GENDER DYSPHORIA IN ADULT: ICD-10-CM

## 2020-11-11 DIAGNOSIS — F41.0 PANIC DISORDER: ICD-10-CM

## 2020-11-11 RX ORDER — TESTOSTERONE CYPIONATE 200 MG/ML
60 INJECTION, SOLUTION INTRAMUSCULAR WEEKLY
Qty: 4 ML | Refills: 5 | Status: SHIPPED | OUTPATIENT
Start: 2020-11-11 | End: 2020-12-07

## 2020-11-11 NOTE — TELEPHONE ENCOUNTER
Medications refilled and MyChart message sent to patient.    We received a refill request from you for Testosterone.  It is very important that you take this medicine, but it is also very important that you be seen in clinic to monitor your health.  Please make an appointment to be seen for annual labs.  I will refill this medication for one more month but we need  to see you as soon as possible because we can't refill this again until you are seen.  Please call our clinic to make an appointment.  We look forward to seeing you soon!    Alison Boyd, DO   she/her/hers   PGY-1  p [phone]

## 2020-12-07 ENCOUNTER — OFFICE VISIT (OUTPATIENT)
Dept: FAMILY MEDICINE | Facility: CLINIC | Age: 20
End: 2020-12-07
Payer: COMMERCIAL

## 2020-12-07 VITALS
OXYGEN SATURATION: 95 % | RESPIRATION RATE: 16 BRPM | BODY MASS INDEX: 30.8 KG/M2 | HEART RATE: 98 BPM | HEIGHT: 62 IN | DIASTOLIC BLOOD PRESSURE: 73 MMHG | TEMPERATURE: 98.2 F | SYSTOLIC BLOOD PRESSURE: 111 MMHG | WEIGHT: 167.4 LBS

## 2020-12-07 DIAGNOSIS — Z90.13 STATUS POST BILATERAL MASTECTOMY: Primary | ICD-10-CM

## 2020-12-07 DIAGNOSIS — F64.0 GENDER DYSPHORIA IN ADULT: ICD-10-CM

## 2020-12-07 LAB
ALBUMIN SERPL-MCNC: 4.3 G/DL (ref 3.4–5)
ALP SERPL-CCNC: 61 U/L (ref 40–150)
ALT SERPL W P-5'-P-CCNC: 30 U/L (ref 0–70)
AST SERPL W P-5'-P-CCNC: 16 U/L (ref 0–45)
BILIRUB DIRECT SERPL-MCNC: <0.1 MG/DL (ref 0–0.2)
BILIRUB SERPL-MCNC: 0.8 MG/DL (ref 0.2–1.3)
CHOLEST SERPL-MCNC: 193 MG/DL
GLUCOSE SERPL-MCNC: 104 MG/DL (ref 70–99)
HDLC SERPL-MCNC: 51 MG/DL
HGB BLD-MCNC: 15.7 G/DL (ref 13.3–17.7)
LDLC SERPL CALC-MCNC: 102 MG/DL
NONHDLC SERPL-MCNC: 142 MG/DL
PROT SERPL-MCNC: 8.3 G/DL (ref 6.8–8.8)
TRIGL SERPL-MCNC: 202 MG/DL

## 2020-12-07 PROCEDURE — 84403 ASSAY OF TOTAL TESTOSTERONE: CPT | Performed by: FAMILY MEDICINE

## 2020-12-07 PROCEDURE — 80076 HEPATIC FUNCTION PANEL: CPT | Performed by: FAMILY MEDICINE

## 2020-12-07 PROCEDURE — 36415 COLL VENOUS BLD VENIPUNCTURE: CPT | Performed by: FAMILY MEDICINE

## 2020-12-07 PROCEDURE — 99214 OFFICE O/P EST MOD 30 MIN: CPT | Mod: GC | Performed by: STUDENT IN AN ORGANIZED HEALTH CARE EDUCATION/TRAINING PROGRAM

## 2020-12-07 PROCEDURE — 85018 HEMOGLOBIN: CPT | Performed by: FAMILY MEDICINE

## 2020-12-07 PROCEDURE — 80061 LIPID PANEL: CPT | Performed by: FAMILY MEDICINE

## 2020-12-07 PROCEDURE — 82947 ASSAY GLUCOSE BLOOD QUANT: CPT | Performed by: FAMILY MEDICINE

## 2020-12-07 PROCEDURE — 99000 SPECIMEN HANDLING OFFICE-LAB: CPT | Performed by: FAMILY MEDICINE

## 2020-12-07 RX ORDER — TESTOSTERONE CYPIONATE 200 MG/ML
60 INJECTION, SOLUTION INTRAMUSCULAR WEEKLY
Qty: 4 ML | Refills: 5 | Status: SHIPPED | OUTPATIENT
Start: 2020-12-07 | End: 2021-02-16

## 2020-12-07 ASSESSMENT — MIFFLIN-ST. JEOR: SCORE: 1638.57

## 2020-12-07 NOTE — PATIENT INSTRUCTIONS
Here is the plan from today's visit    1. Gender dysphoria in adult  Your annual labs were completed today and results will be sent via [employer]  Recommend sending a message to me if you continue to have spotting, as that is not typical for patients on Testosterone    - Hemoglobin (HGB) ([facility])  - Lipid Cascade ([facility])  - Glucose ([facility])  - Hepatic Panel ([facility])  - Testosterone total  - testosterone cypionate (DEPOTESTOSTERONE) 200 MG/ML injection; Inject 0.3 mLs (60 mg) into the muscle once a week  Dispense: 4 mL; Refill: 5      Please call or return to clinic if your symptoms don't go away.    Follow up plan  Please make a clinic appointment for follow up with me (PARKER JAMISON) in 1  year for annual labs.    Thank you for coming to [facility] today.  Lab Testing:  **If you had lab testing today and your results are reassuring or normal they will be mailed to you or sent through [employer] within 7 days.   **If the lab tests need quick action we will call you with the results.  The phone number we will call with results is # [phone] (home) . If this is not the best number please call our clinic and change the number.  Medication Refills:  If you need any refills please call your pharmacy and they will contact us.   If you need to  your refill at a new pharmacy, please contact the new pharmacy directly. The new pharmacy will help you get your medications transferred faster.   Scheduling:  If you have any concerns about today's visit or wish to schedule another appointment please call our office during normal business hours [phone] (8-5:00 M-F)  If a referral was made to a [facility] and you don't get a call from central scheduling please call [phone].  If a Mammogram was ordered for you at [facility] call [phone] to schedule or change your appointment.  If you had an XRay/CT/Ultrasound/MRI ordered the number is [phone] to schedule or  change your radiology appointment.   Medical Concerns:  If you have urgent medical concerns please call [phone] at any time of the day.    Alison Boyd, DO

## 2020-12-07 NOTE — PROGRESS NOTES
"       JIGAR Matta is a 20 year old individual that uses pronouns He/Him/His/Himself that presents today for follow up of:  masculinizing hormone therapy. He transitioned in 2016 and recently had top surgery July 2020. Last appt with plastic surgery was 10/2020 and they noted normal recovery, recommending yearly follow-up as needed.     Gender identity: male  No current sexual partners    Any special concerns today?  concerns about some numbness over the chest area s/p mastectomy    On hormones?  YES +++ Shot day of the week? Friday   Due for labs?  Yes      +++ Refills of meds needed?  Yes    Was interested in Testosterone pellets but was told that we don't carry them here    Gender affirmation is being sought in these other ways: changed license, plans for partial hysterectomy surgery in the next year or two, may need referral    Denied flu shot    ---    Past Surgical History:   Procedure Laterality Date     TRANSGENDER MASTECTOMY Bilateral 7/31/2020    Procedure: Bilateral mastectomy with free nipple grafting;  Surgeon: Mono Altamirano MD;  Location: [facility] OR     Patient Active Problem List   Diagnosis     Gender dysphoria in adolescent and adult     Anxiety     Panic disorder     Gender dysphoria in adult     Current Outpatient Medications   Medication Sig Dispense Refill     B-D LUER-JOAQUIM SYRINGE 25G X 5/8\" 3 ML MISC        hydrOXYzine (ATARAX) 25 MG tablet Take 1 tablet (25 mg) by mouth 3 times daily as needed for anxiety 30 tablet 1     needle, disp, 18G X 1\" MISC Use to draw up hormones once weekly 25 each 3     Needle, Disp, 25G X 5/8\" MISC Use to inject hormones weekly 25 each 2     sertraline (ZOLOFT) 25 MG tablet Take one tablet (25mg) by mouth daily with 50mg tablet to total 75mg daily. 90 tablet 3     sertraline (ZOLOFT) 50 MG tablet Take one tablet (50mg) by mouth daily with 25mg tablet to total 75mg daily. 90 tablet 3     syringe, disposable, 1 ML MISC Use to draw up hormones once weekly 25 each 3     " "testosterone cypionate (DEPOTESTOSTERONE) 200 MG/ML injection Inject 0.3 mLs (60 mg) into the muscle once a week 4 mL 5     History   Smoking Status     Never Smoker   Smokeless Tobacco     Never Used      No Known Allergies    Problem, Medication and Allergy Lists were reviewed and are current..         Review of Systems:        General    Fat redistribution: YES    Weight change: YES, gain HEENT    Voice change: YES     Cardiovascular (CV)    Chest Pains: no    Shortness of breath: no Chest    Decreased exercise tolerance:  no    Breast changes/development: YES, status post bilateral mastectomy July 2020     Gastrointestinal (GI)    Abdominal pain: YES, during menstruation last month    Change in appetite: no Skin    Acne or oily skin: YES    Change in hair: no     Genitourinary ()    Change in libido: no    New sexual partners: no Musculoskeletal    Leg pain or swelling: no     Psychiatric (Psych)    Depression: no    Anxiety/Panic: YES, takes sertraline 50 daily    Mood:  \"good\"      Has a therapist he sees every 3-4 weeks  Had regular menstruations before Testosterone, now spots monthly every month.   Denies any redness, discharge, heat over the chest; has had some numbness over nipple since his mastectomy            Physical Exam:     Vitals:    12/07/20 1508   BP: 111/73   Pulse: 98   Resp: 16   Temp: 98.2  F (36.8  C)   TempSrc: Oral   SpO2: 95%   Weight: 75.9 kg (167 lb 6.4 oz)   Height: 1.575 m (5' 2\")     BMI= Body mass index is 30.62 kg/m .   Wt Readings from Last 10 Encounters:   12/07/20 75.9 kg (167 lb 6.4 oz)   07/31/20 67 kg (147 lb 11.3 oz)   07/21/20 68 kg (150 lb)   07/17/20 68.9 kg (152 lb)   09/11/19 59.5 kg (131 lb 3.2 oz)   09/03/19 59.4 kg (130 lb 14.4 oz)   08/08/19 59.7 kg (131 lb 9.6 oz)   07/19/19 59.9 kg (132 lb)   05/13/19 64 kg (141 lb 3.2 oz)   04/10/19 63.5 kg (140 lb)     GENERAL:: healthy, alert and no distress  Affect: Appropriate/mood-congruent  Skin: Warm and dry,   Chest: " recent, well healing scars bilaterally from sternum to axilla; no discharge, scabbing, discoloration. Moderate numbness in area inferior to nipple above the scarring  Eyes: Extra-ocular muscles intact, pupils equal and reactive.  ENT: Speech intact, nasal passages open, no hearing impairment noted.  CV: RRR, no MGR; No cyanosis or pallor, warm and well perfused.  Respiratory: CAB; No respiratory distress, no accessory muscle use.  Neuro: Gait and station normal, comprehension intact. Gross and fine motor skills intact.   Psychiatric: Mood and affect appear normal.   Extremities: Warm, able to move all four extremities at will.         Labs:   Lab results pending    Assessment and Plan     Paxton is a 19yo transmale presenting today for annual labs and check-in.    1. Gender dysphoria in adult  He is status post bilateral mastectomy in July 2020 and is healing well. He has plans for a partial hysterectomy in the next year or two and would like a referral for a surgeon when the time comes. He did mention that even while on Testosterone, he has monthly spotting, but was worried when he did not spot last month. We discussed that this is not common for individuals on hormones and we may need to adjust his T level or do additonal lab testing. He also has been experiencing  He seems a therapist every 3-4 weeks, lives with family, and was in a pleasant mood.    Plan: Hemoglobin (HGB) ([facility]), Lipid Cascade ([facility]), Glucose ([facility]), Hepatic Panel ([facility]), Testosterone total, testosterone cypionate (DEPOTESTOSTERONE) 200 MG/ML injection    2. Status post bilateral mastectomy  Surgery was 7/31/2020 with Dr. Mono Altamirano. Had postoperative follow-up on 10/6; was recommended to continue scar management for the next two months and return in 1 year for follow-up. Today, the scars are healing well but Paxton is still experiencing numbness inferiolaterally to the nipple bilaterally, which appears to be normal for the  healing process. Advised him to make an appointment with Dr. Altamirano if sensation does not continue to return by the end of January.        Contraception:  Has not been sexually active in the last 6 months. Is not currently on contraception so discussed the possibility of pregnancy when he has a uterus/ovaries.    Follow up:  Follow up in 1 year.  Results by carleen  Questions were elicited and answered.     Alison Boyd, DO   she/her/hers   PGY-1  p [phone]

## 2020-12-07 NOTE — PROGRESS NOTES
Preceptor Attestation:    Patient seen and evaluated in person. I discussed the patient with the resident. I have verified the content of the note, which accurately reflects my assessment of the patient and the plan of care.   Supervising Physician:  Amee Holguin MD.

## 2020-12-09 LAB — TESTOST SERPL-MCNC: 226 NG/DL (ref 240–950)

## 2020-12-14 ENCOUNTER — MYC MEDICAL ADVICE (OUTPATIENT)
Dept: FAMILY MEDICINE | Facility: CLINIC | Age: 20
End: 2020-12-14

## 2020-12-21 DIAGNOSIS — F64.0 GENDER DYSPHORIA IN ADULT: Primary | ICD-10-CM

## 2020-12-27 ENCOUNTER — HEALTH MAINTENANCE LETTER (OUTPATIENT)
Age: 20
End: 2020-12-27

## 2021-02-04 DIAGNOSIS — F41.0 PANIC DISORDER: ICD-10-CM

## 2021-02-04 RX ORDER — SERTRALINE HYDROCHLORIDE 25 MG/1
TABLET, FILM COATED ORAL
Qty: 90 TABLET | Refills: 3 | Status: SHIPPED | OUTPATIENT
Start: 2021-02-04 | End: 2022-01-28

## 2021-02-08 DIAGNOSIS — F64.0 GENDER DYSPHORIA IN ADOLESCENT AND ADULT: ICD-10-CM

## 2021-02-08 DIAGNOSIS — F64.0 GENDER DYSPHORIA IN ADULT: ICD-10-CM

## 2021-02-08 NOTE — TELEPHONE ENCOUNTER
Fill history unknown -- not filled at \[facility]\"" in recent times.      Thank you,    Hina Mike, PharmD  [facility]  [phone]

## 2021-02-16 DIAGNOSIS — F64.0 GENDER DYSPHORIA IN ADULT: ICD-10-CM

## 2021-02-16 DIAGNOSIS — F64.0 GENDER DYSPHORIA IN ADOLESCENT AND ADULT: ICD-10-CM

## 2021-02-16 RX ORDER — TESTOSTERONE CYPIONATE 200 MG/ML
60 INJECTION, SOLUTION INTRAMUSCULAR WEEKLY
Qty: 4 ML | Refills: 5 | Status: SHIPPED | OUTPATIENT
Start: 2021-02-16 | End: 2022-01-20

## 2021-02-16 NOTE — TELEPHONE ENCOUNTER
"Per pt \"\"I need my supplies refilled at my [facility] pharmacy. I'm unable to get my supplies at your pharmacy because I'm always working late. I just need my 18G needle. And then if you could fill my 25 5/8 needle, testosterone and my syringes at the [facility] in [place], the [facility], that would be great! Thank you.    RN refilled supplies, routing T refill to PCP    Jessica Metz RN    "

## 2021-10-09 ENCOUNTER — HEALTH MAINTENANCE LETTER (OUTPATIENT)
Age: 21
End: 2021-10-09

## 2021-11-26 DIAGNOSIS — F41.0 PANIC DISORDER: ICD-10-CM

## 2021-12-02 DIAGNOSIS — F64.0 GENDER DYSPHORIA IN ADULT: ICD-10-CM

## 2021-12-02 NOTE — TELEPHONE ENCOUNTER

## 2022-01-20 DIAGNOSIS — F64.0 GENDER DYSPHORIA IN ADULT: ICD-10-CM

## 2022-01-20 NOTE — LETTER
January 23, 2022      Paxton  [address]  [place] [facility] [address]    [id number]      Dear Paxton,      A request for a refill on your Testosterone prescription was sent to us from your pharmacy. I have notified the pharmacy to allow you one more refill.     It is time for your recheck at the clinic so we can monitor the medication and continue to prescribe it safely. Please make a lab only appointment to have your labs checked mid-cycle and an appointment with me or another provider at least one week following. This should be in the next month.    Thank you for choosing us as your healthcare provider.      Sincerely,        Alison Boyd, DO

## 2022-01-23 NOTE — TELEPHONE ENCOUNTER
Dear Paxton,      A request for a refill on your Testosterone prescription was sent to us from your pharmacy. I have notified the pharmacy to allow you one more refill.     It is time for your recheck at the clinic so we can monitor the medication and continue to prescribe it safely. Please make clinic appointment with me or another provider at [facility] in the next one month. This visit could be virtual or in-person.    Thank you for choosing us as your healthcare provider.      Sincerely,    Alison Boyd, DO

## 2022-01-24 DIAGNOSIS — F41.0 PANIC DISORDER: ICD-10-CM

## 2022-01-24 DIAGNOSIS — F64.0 GENDER DYSPHORIA IN ADULT: ICD-10-CM

## 2022-01-24 RX ORDER — TESTOSTERONE CYPIONATE 200 MG/ML
60 INJECTION, SOLUTION INTRAMUSCULAR WEEKLY
Qty: 4 ML | Refills: 0 | Status: SHIPPED | OUTPATIENT
Start: 2022-01-24 | End: 2022-01-28

## 2022-01-27 NOTE — TELEPHONE ENCOUNTER
"Request for medication refill:  sertraline (ZOLOFT) 25 MG tablet  Providers if patient needs an appointment and you are willing to give a one month supply please refill for one month and  send a letter/MyChart using \".SMILLIMITEDREFILL\" .smillimited and route chart to \"P [facility] \" (Giving one month refill in non controlled medications is strongly recommended before denial)    If refill has been denied, meaning absolutely no refills without visit, please complete the smart phrase \".smirxrefuse\" and route it to the \"P [facility] MED REFILLS\"  pool to inform the patient and the pharmacy.    Catherine Mendoza        "

## 2022-01-28 ENCOUNTER — MYC REFILL (OUTPATIENT)
Dept: FAMILY MEDICINE | Facility: CLINIC | Age: 22
End: 2022-01-28
Payer: COMMERCIAL

## 2022-01-28 DIAGNOSIS — F64.0 GENDER DYSPHORIA IN ADULT: Primary | ICD-10-CM

## 2022-01-28 RX ORDER — TESTOSTERONE CYPIONATE 200 MG/ML
60 INJECTION, SOLUTION INTRAMUSCULAR WEEKLY
Qty: 4 ML | Refills: 0 | Status: SHIPPED | OUTPATIENT
Start: 2022-01-28 | End: 2022-05-13

## 2022-01-28 RX ORDER — SERTRALINE HYDROCHLORIDE 25 MG/1
TABLET, FILM COATED ORAL
Qty: 90 TABLET | Refills: 3 | Status: SHIPPED | OUTPATIENT
Start: 2022-01-28 | End: 2022-05-13

## 2022-01-29 ENCOUNTER — HEALTH MAINTENANCE LETTER (OUTPATIENT)
Age: 22
End: 2022-01-29

## 2022-02-22 DIAGNOSIS — F41.0 PANIC DISORDER: ICD-10-CM

## 2022-02-22 RX ORDER — HYDROXYZINE HYDROCHLORIDE 25 MG/1
25 TABLET, FILM COATED ORAL 3 TIMES DAILY PRN
Qty: 90 TABLET | Refills: 3 | Status: SHIPPED | OUTPATIENT
Start: 2022-02-22 | End: 2022-03-21

## 2022-02-22 NOTE — TELEPHONE ENCOUNTER
"Request for medication refill:    hydrOXYzine (ATARAX) 25 MG tablet    Providers if patient needs an appointment and you are willing to give a one month supply please refill for one month and  send a letter/MyChart using \".SMILLIMITEDREFILL\" .smillimited and route chart to \"P [facility] \" (Giving one month refill in non controlled medications is strongly recommended before denial)    If refill has been denied, meaning absolutely no refills without visit, please complete the smart phrase \".smirxrefuse\" and route it to the \"P [facility] MED REFILLS\"  pool to inform the patient and the pharmacy.    Mishel Mott, CMA        "

## 2022-03-21 ENCOUNTER — TELEPHONE (OUTPATIENT)
Dept: FAMILY MEDICINE | Facility: CLINIC | Age: 22
End: 2022-03-21
Payer: COMMERCIAL

## 2022-03-21 DIAGNOSIS — F41.0 PANIC DISORDER: ICD-10-CM

## 2022-03-21 RX ORDER — HYDROXYZINE HYDROCHLORIDE 25 MG/1
25 TABLET, FILM COATED ORAL 3 TIMES DAILY PRN
Qty: 90 TABLET | Refills: 0 | Status: SHIPPED | OUTPATIENT
Start: 2022-03-21 | End: 2022-04-19

## 2022-03-21 NOTE — TELEPHONE ENCOUNTER
"Request for medication refill:  hydrOXYzine (ATARAX) 25 MG tablet  Providers if patient needs an appointment and you are willing to give a one month supply please refill for one month and  send a letter/MyChart using \".SMILLIMITEDREFILL\" .smillimited and route chart to \"P [facility] \" (Giving one month refill in non controlled medications is strongly recommended before denial)    If refill has been denied, meaning absolutely no refills without visit, please complete the smart phrase \".smirxrefuse\" and route it to the \"P [facility] MED REFILLS\"  pool to inform the patient and the pharmacy.    Catherine Mendoza        "

## 2022-04-19 DIAGNOSIS — F41.0 PANIC DISORDER: ICD-10-CM

## 2022-04-19 RX ORDER — HYDROXYZINE HYDROCHLORIDE 25 MG/1
25 TABLET, FILM COATED ORAL 3 TIMES DAILY PRN
Qty: 45 TABLET | Refills: 0 | Status: SHIPPED | OUTPATIENT
Start: 2022-04-19 | End: 2022-05-13

## 2022-04-19 NOTE — TELEPHONE ENCOUNTER
"Request for medication refill:  hydrOXYzine (ATARAX) 25 MG tablet    Providers if patient needs an appointment and you are willing to give a one month supply please refill for one month and  send a letter/MyChart using \".SMILLIMITEDREFILL\" .smillimited and route chart to \"P [facility] \" (Giving one month refill in non controlled medications is strongly recommended before denial)    If refill has been denied, meaning absolutely no refills without visit, please complete the smart phrase \".smirxrefuse\" and route it to the \"P [facility] MED REFILLS\"  pool to inform the patient and the pharmacy.    Amee Garcia MA        "

## 2022-05-13 ENCOUNTER — VIRTUAL VISIT (OUTPATIENT)
Dept: FAMILY MEDICINE | Facility: CLINIC | Age: 22
End: 2022-05-13
Payer: COMMERCIAL

## 2022-05-13 DIAGNOSIS — R73.9 HYPERGLYCEMIA: ICD-10-CM

## 2022-05-13 DIAGNOSIS — Z79.890 HORMONE REPLACEMENT THERAPY: Primary | ICD-10-CM

## 2022-05-13 DIAGNOSIS — F64.0 GENDER DYSPHORIA IN ADOLESCENT AND ADULT: ICD-10-CM

## 2022-05-13 DIAGNOSIS — F41.0 PANIC DISORDER: ICD-10-CM

## 2022-05-13 DIAGNOSIS — F64.0 GENDER DYSPHORIA IN ADULT: ICD-10-CM

## 2022-05-13 DIAGNOSIS — F41.9 ANXIETY: ICD-10-CM

## 2022-05-13 PROCEDURE — 99214 OFFICE O/P EST MOD 30 MIN: CPT | Mod: GT | Performed by: STUDENT IN AN ORGANIZED HEALTH CARE EDUCATION/TRAINING PROGRAM

## 2022-05-13 RX ORDER — SERTRALINE HYDROCHLORIDE 25 MG/1
TABLET, FILM COATED ORAL
Qty: 90 TABLET | Refills: 3 | Status: SHIPPED | OUTPATIENT
Start: 2022-05-13 | End: 2023-01-27

## 2022-05-13 RX ORDER — TESTOSTERONE CYPIONATE 200 MG/ML
60 INJECTION, SOLUTION INTRAMUSCULAR WEEKLY
Qty: 10 ML | Refills: 1 | Status: SHIPPED | OUTPATIENT
Start: 2022-05-13 | End: 2023-01-27 | Stop reason: ALTCHOICE

## 2022-05-13 RX ORDER — HYDROXYZINE HYDROCHLORIDE 25 MG/1
25 TABLET, FILM COATED ORAL 3 TIMES DAILY PRN
Qty: 45 TABLET | Refills: 0 | Status: SHIPPED | OUTPATIENT
Start: 2022-05-13 | End: 2022-05-26

## 2022-05-13 ASSESSMENT — PATIENT HEALTH QUESTIONNAIRE - PHQ9: SUM OF ALL RESPONSES TO PHQ QUESTIONS 1-9: 2

## 2022-05-13 NOTE — PROGRESS NOTES
Preceptor Attestation:   Patient seen and evaluated via video visit. I discussed the patient with the resident. I have verified the content of the note, which accurately reflects my assessment of the patient and the plan of care.   Supervising Physician:  Anahi Miguel DO

## 2022-05-13 NOTE — PATIENT INSTRUCTIONS
Patient Education   Here is the plan from today's visit    1. Hormone replacement therapy  2. Gender dysphoria in adult  As we discussed, you are due for a pap next year. If you would like to make your next visit in person, we can discuss lab results, hormone treatment, and complete the pap in the same visit. If you would like Ativan to help with anxiety (someone would need to drive you home if you choose this) or topical estrogen, please let me know prior to the visit.  - Testosterone total; Future  - Glucose; Future  - Lipid panel; Future  - Hemoglobin; Future    3. Anxiety  5. Panic disorder  - hydrOXYzine (ATARAX) 25 MG tablet; Take 1 tablet (25 mg) by mouth 3 times daily as needed for anxiety  Dispense: 45 tablet; Refill: 0  - sertraline (ZOLOFT) 25 MG tablet; Take one tablet (25mg) by mouth daily with 50mg tablet to total 75mg daily.  Dispense: 90 tablet; Refill: 3  - sertraline (ZOLOFT) 50 MG tablet; Take one tablet (50mg) by mouth daily with 25mg tablet to total 75mg daily.  Dispense: 90 tablet; Refill: 3    4. Hyperglycemia  The last two glucose levels have been mildly elevated. Please have your labs next year fasting so that we can accurately assess your sugar.            Please call or return to clinic if your symptoms don't go away.    Follow up plan  No follow-ups on file.    Thank you for coming to [facility] today.  Lab Testing:  **If you had lab testing today and your results are reassuring or normal they will be mailed to you or sent through [employer] within 7 days.   **If the lab tests need quick action we will call you with the results.  **If you are having labs done on a different day, please call [phone] to schedule at [facility] or [phone] for other [facility] locations. Labs do not offer walk-in appointments.  The phone number we will call with results is # [phone] (home) . If this is not the best number please call our clinic and change the  number.  Medication Refills:  If you need any refills please call your pharmacy and they will contact us.   If you need to  your refill at a new pharmacy, please contact the new pharmacy directly. The new pharmacy will help you get your medications transferred faster.   Scheduling:  If you have any concerns about today's visit or wish to schedule another appointment please call our office during normal business hours [phone] (8-5:00 M-F)  If a referral was made to an [facility] specialty provider and you do not get a call from central scheduling, please refer to directions on your visit summary or call our office during normal business hours for assistance.   If a Mammogram was ordered for you at [facility] call [phone] to schedule or change your appointment.  If you had an XRay/CT/Ultrasound/MRI ordered the number is [phone] to schedule or change your radiology appointment.   [facility] has limited ultrasound appointments available on Wednesdays, if you would like your ultrasound at [facility], please call [phone] to schedule.   Medical Concerns:  If you have urgent medical concerns please call [phone] at any time of the day.    Alison Boyd, DO

## 2022-05-13 NOTE — PROGRESS NOTES
Family Medicine Video Visit Note  Paxton is being evaluated via a billable video visit.           Video Visit Consent     Chief Complaint   Patient presents with     Recheck Medication     Med F/U and lab results            HPI     Video Start Time: 1:28 PM    Paxton is a 21 year old individual that uses pronouns He/Him/His/Himself that presents today for follow up of:  masculinizing hormone therapy.     On hormones? Yes Testosterone 0.3mLs weekly  Labs: TT low, figured it would be because missed shots (shot aversion), menstruated last month.     PHQ 8/8/2019 7/17/2020 5/13/2022   PHQ-9 Total Score 15 11 2   Q9: Thoughts of better off dead/self-harm past 2 weeks Not at all Not at all Not at all       Lab on 04/15/2022   Component Date Value Ref Range Status     Testosterone Total 04/16/2022 158  8 - 950 ng/dL Final     Hemoglobin 04/16/2022 15.6  11.7 - 17.7 g/dL Final    Sex Specific Reference Ranges:    Female  11.7-15.7  g/dL  Male    13.3-17.7   g/dL       Cholesterol 04/16/2022 175  <200 mg/dL Final     Triglycerides 04/16/2022 150 (A) <150 mg/dL Final     Direct Measure HDL 04/16/2022 48  >=40 mg/dL Final     LDL Cholesterol Calculated 04/16/2022 97  <=100 mg/dL Final     Non HDL Cholesterol 04/16/2022 127  <130 mg/dL Final     Patient Fasting > 8hrs? 04/16/2022 No   Final     Bilirubin Total 04/16/2022 0.4  0.2 - 1.3 mg/dL Final     Bilirubin Direct 04/16/2022 0.1  0.0 - 0.2 mg/dL Final     Protein Total 04/16/2022 7.8  6.8 - 8.8 g/dL Final     Albumin 04/16/2022 4.0  3.4 - 5.0 g/dL Final     Alkaline Phosphatase 04/16/2022 61  40 - 150 U/L Final    Female:    0-3 years  110-320 U/L  4-9 years  150-420 U/L  10-11 years  130-560 U/L  12-13 years  105-420 U/L  14-15 years   U/L  16 years and older   U/L      Male:  0-3 years  110-320 U/L  4-9 years  150-420 U/L  10-15 years  130-530 U/L  16-19 years   U/L  20 years and older   U/L       AST 04/16/2022 28  0 - 45 U/L Final     ALT  "04/16/2022 52  0 - 70 U/L Final    Female    All ages 0-50 U/L    Male    0 - 19 years       0-50 U/L  20 years and older 0-70 U/L            Glucose 04/16/2022 122 (A) 70 - 99 mg/dL Final     Patient Fasting > 8hrs? 04/16/2022 No   Final     ---    Past Surgical History:   Procedure Laterality Date     TRANSGENDER MASTECTOMY Bilateral 7/31/2020    Procedure: Bilateral mastectomy with free nipple grafting;  Surgeon: Mono Altamirano MD;  Location: [facility] OR       Patient Active Problem List   Diagnosis     Anxiety     Panic disorder     Gender dysphoria in adult     Hormone replacement therapy       Current Outpatient Medications   Medication Sig Dispense Refill     hydrOXYzine (ATARAX) 25 MG tablet Take 1 tablet (25 mg) by mouth 3 times daily as needed for anxiety 45 tablet 0     sertraline (ZOLOFT) 25 MG tablet Take one tablet (25mg) by mouth daily with 50mg tablet to total 75mg daily. 90 tablet 3     sertraline (ZOLOFT) 50 MG tablet Take one tablet (50mg) by mouth daily with 25mg tablet to total 75mg daily. 90 tablet 3     B-D LUER-JOAQUIM SYRINGE 25G X 5/8\" 3 ML MISC        needle, disp, 18G X 1\" MISC Use to draw up hormones once weekly 25 each 3     Needle, Disp, 25G X 5/8\" MISC Use to inject hormones weekly 25 each 2     syringe, disposable, 1 ML MISC Use to draw up hormones once weekly 25 each 3     testosterone cypionate (DEPOTESTOSTERONE) 200 MG/ML injection Inject 0.3 mLs (60 mg) into the muscle once a week NEEDS LAB AND APPT 4 mL 0       History   Smoking Status     Never Smoker   Smokeless Tobacco     Never Used          Review of Systems:        General    Fat redistribution: no    Weight change: YES, gained HEENT    Voice change: YES     Cardiovascular (CV)    Chest Pains: no    Shortness of breath: no Chest    Decreased exercise tolerance:  no    Breast changes/development: no     Gastrointestinal (GI)    Abdominal pain: no    Change in appetite: no Skin    Acne or oily skin: no    Change in hair: YES, " "receeding hair on head     Genitourinary ()    Abnormal vaginal bleeding: YES menses last month    Change in libido: no    New sexual partners: no Musculoskeletal    Leg pain or swelling: no     Psychiatric (Psych)    Depression: no    Anxiety/Panic: no    Mood:  \"good\"    Sexually active: not currently, \"good about being pretty careful\"            Physical Exam:     There were no vitals taken for this visit.  Estimated body mass index is 30.62 kg/m  as calculated from the following:    Height as of 12/7/20: 1.575 m (5' 2\").    Weight as of 12/7/20: 75.9 kg (167 lb 6.4 oz).    GENERAL: Healthy, alert and no distress  EYES: Eyes grossly normal to inspection.  No discharge or erythema, or obvious scleral/conjunctival abnormalities.  RESP: No audible wheeze, cough, or visible cyanosis.  No visible retractions or increased work of breathing.    SKIN: Visible skin clear. No significant rash, abnormal pigmentation or lesions.  NEURO: Cranial nerves grossly intact.  Mentation and speech appropriate for age.  PSYCH: Mentation appears normal, affect normal/bright, judgement and insight intact, normal speech and appearance well-groomed.      Assessment and Plan     Hormone replacement therapy  Gender dysphoria in adult   Stable on current dose, but has a needle aversion and has missed some injections. Continued prior discussion about pellet therapy. Planning on hysterectomy 2023, when he finishes with school and offered referral/letters of support when the time comes. Discussed pregnancy prevention.  - due for pap Sept 2022, discussed options for pre-treatment and Paxton will Theorem message if he needs Ativan or Estrogen cream prior  - will send the names of local providers that to pellet therapy via [employer]  - refill Testosterone today    Next labs and exam      Recommended laboratory follow up: 1 year, ordered today    Recommend in person visit next year for pap    Hyperglycemia  Labs not fasting, likely contributing to " hyperglycemia- were elevated last year and this year but not prior.  - recheck fasting in 1 year; if elevated, will check A1C    Anxiety  Panic disorder  PHQ9 today 2, reports good symptom control. Last panic attack 2 days ago but reports infrequent (specific anxiety triggers like thunderstorm Wednesday); uses Atarax in those situations. Continues on Sertraline 75mg  - refill Atarax 25mg TID prn for panic attacks  - refill Sertraline 75mg daily    Video-Visit Details    Type of service:  Video Visit    Video End Time (time video stopped): 1:46 PM    Originating Location (pt. Location): work    Distant Location (provider location):  [facility]     Platform used for Video Visit: Maksim Boyd, DO   she/her  PGY-2  Family Medicine

## 2022-05-21 ENCOUNTER — OFFICE VISIT (OUTPATIENT)
Dept: URGENT CARE | Facility: URGENT CARE | Age: 22
End: 2022-05-21
Payer: COMMERCIAL

## 2022-05-21 VITALS
BODY MASS INDEX: 32.37 KG/M2 | WEIGHT: 177 LBS | DIASTOLIC BLOOD PRESSURE: 72 MMHG | OXYGEN SATURATION: 100 % | TEMPERATURE: 97.9 F | SYSTOLIC BLOOD PRESSURE: 122 MMHG | RESPIRATION RATE: 18 BRPM | HEART RATE: 85 BPM

## 2022-05-21 DIAGNOSIS — J02.0 STREP THROAT: ICD-10-CM

## 2022-05-21 DIAGNOSIS — R07.0 THROAT PAIN: Primary | ICD-10-CM

## 2022-05-21 LAB — DEPRECATED S PYO AG THROAT QL EIA: POSITIVE

## 2022-05-21 PROCEDURE — 99213 OFFICE O/P EST LOW 20 MIN: CPT | Performed by: FAMILY MEDICINE

## 2022-05-21 PROCEDURE — 87880 STREP A ASSAY W/OPTIC: CPT | Performed by: FAMILY MEDICINE

## 2022-05-21 RX ORDER — AMOXICILLIN 875 MG
875 TABLET ORAL 2 TIMES DAILY
Qty: 20 TABLET | Refills: 0 | Status: SHIPPED | OUTPATIENT
Start: 2022-05-21 | End: 2023-01-27

## 2022-05-21 NOTE — PROGRESS NOTES
"    ASSESSMENT/  PLAN:  Throat pain     - Streptococcus A Rapid Screen w/Reflex to PCR - Clinic Collect    Strep throat     - amoxicillin (AMOXIL) 875 MG tablet; Take 1 tablet (875 mg) by mouth 2 times daily     Patient was counseled that to prevent spreading the strep infection that he should stay out of public places, work or school until he has completed 24 hours of antibiotic treatment     Symptomatic treat with gargles, lozenges, and OTC analgesic as needed. Follow-up with primary clinic if not improving.  ------------------------------------------------------------------------------------------------------------------------------------    SUBJECTIVE:  Chief Complaint   Patient presents with     Urgent Care     Sore throat onset on wednsday, took a covid test at home Thursday and one today and result was negative      Paxton Meredith is a 21 year old adult with a chief complaint of sore throat.  Onset of symptoms was 3 day(s) ago.    Course of illness: gradual onset, still present and constant.  Severity moderate  Current and Associated symptoms: sore throat  Treatment measures tried include Tylenol/Ibuprofen.  Predisposing factors include None.    Past Medical History:   Diagnosis Date     Anxiety      Depressive disorder      Patient Active Problem List   Diagnosis     Anxiety     Panic disorder     Gender dysphoria in adult     Hormone replacement therapy         ALLERGIES:  Patient has no known allergies.    B-D LUER-JOAQUIM SYRINGE 25G X 5/8\" 3 ML MISC,   hydrOXYzine (ATARAX) 25 MG tablet, Take 1 tablet (25 mg) by mouth 3 times daily as needed for anxiety  needle, disp, 18G X 1\" MISC, Use to draw up hormones once weekly  Needle, Disp, 25G X 5/8\" MISC, Use to inject hormones weekly  sertraline (ZOLOFT) 25 MG tablet, Take one tablet (25mg) by mouth daily with 50mg tablet to total 75mg daily.  sertraline (ZOLOFT) 50 MG tablet, Take one tablet (50mg) by mouth daily with 25mg tablet to total 75mg " daily.  syringe, disposable, 1 ML MISC, Use to draw up hormones once weekly  testosterone cypionate (DEPOTESTOSTERONE) 200 MG/ML injection, Inject 0.3 mLs (60 mg) into the muscle once a week    No current facility-administered medications on file prior to visit.      Social History     Tobacco Use     Smoking status: Never Smoker     Smokeless tobacco: Never Used   Substance Use Topics     Alcohol use: No       Family History   Problem Relation Age of Onset     Arthritis Father      Leukemia Paternal Grandmother      Hypertension Paternal Grandfather      Skin Cancer Other      Melanoma Other      Diabetes Maternal Grandmother          ROS:  CONSTITUTIONAL:NEGATIVE for fever, chills,   INTEGUMENTARY/SKIN: NEGATIVE for worrisome rashes,  or lesions  EYES: NEGATIVE for vision changes or irritation  RESP:NEGATIVE for significant cough or SOB    OBJECTIVE:   /72   Pulse 85   Temp 97.9  F (36.6  C)   Resp 18   Wt 80.3 kg (177 lb)   SpO2 100%   BMI 32.37 kg/m    GENERAL APPEARANCE: alert, mild distress and cooperative  EYES: EOMI,  PERRL, conjunctiva clear  HENT: ear canals and TM's normal.  Nose normal.  Pharynx erythematous with some exudate noted.  NECK: supple, non-tender to palpation, no adenopathy noted  RESP: lungs clear to auscultation - no rales, rhonchi or wheezes  CV: regular rates and rhythm, normal S1 S2, no murmur noted  ABDOMEN:  soft, nontender, no HSM or masses and bowel sounds normal  SKIN: no suspicious lesions or rashes    Rapid Strep test is positive

## 2022-05-26 DIAGNOSIS — F41.9 ANXIETY: ICD-10-CM

## 2022-05-26 RX ORDER — HYDROXYZINE HYDROCHLORIDE 25 MG/1
25 TABLET, FILM COATED ORAL 3 TIMES DAILY PRN
Qty: 90 TABLET | Refills: 3 | Status: SHIPPED | OUTPATIENT
Start: 2022-05-26 | End: 2022-10-05

## 2022-05-26 NOTE — TELEPHONE ENCOUNTER
"Request for medication refill:  hydrOXYzine (ATARAX) 25 MG tablet    Providers if patient needs an appointment and you are willing to give a one month supply please refill for one month and  send a letter/MyChart using \".SMILLIMITEDREFILL\" .smillimited and route chart to \"[facility] \" (Giving one month refill in non controlled medications is strongly recommended before denial)    If refill has been denied, meaning absolutely no refills without visit, please complete the smart phrase \".smirxrefuse\" and route it to the \"[facility] MED REFILLS\"  pool to inform the patient and the pharmacy.    Amee Garcia MA        "

## 2022-09-11 ENCOUNTER — HEALTH MAINTENANCE LETTER (OUTPATIENT)
Age: 22
End: 2022-09-11

## 2023-01-27 ENCOUNTER — VIRTUAL VISIT (OUTPATIENT)
Dept: FAMILY MEDICINE | Facility: CLINIC | Age: 23
End: 2023-01-27
Payer: COMMERCIAL

## 2023-01-27 DIAGNOSIS — F41.0 PANIC DISORDER: ICD-10-CM

## 2023-01-27 DIAGNOSIS — F41.9 ANXIETY: ICD-10-CM

## 2023-01-27 DIAGNOSIS — Z79.890 HORMONE REPLACEMENT THERAPY: ICD-10-CM

## 2023-01-27 DIAGNOSIS — R73.9 HYPERGLYCEMIA: Primary | ICD-10-CM

## 2023-01-27 DIAGNOSIS — F64.0 GENDER DYSPHORIA IN ADULT: ICD-10-CM

## 2023-01-27 DIAGNOSIS — Z90.13 S/P BILATERAL MASTECTOMY: ICD-10-CM

## 2023-01-27 PROCEDURE — 99214 OFFICE O/P EST MOD 30 MIN: CPT | Mod: GT | Performed by: STUDENT IN AN ORGANIZED HEALTH CARE EDUCATION/TRAINING PROGRAM

## 2023-01-27 RX ORDER — TESTOSTERONE 1.62 MG/G
2 GEL TRANSDERMAL DAILY
Qty: 75 G | Refills: 1 | Status: SHIPPED | OUTPATIENT
Start: 2023-01-27 | End: 2023-01-30

## 2023-01-27 RX ORDER — SERTRALINE HYDROCHLORIDE 25 MG/1
TABLET, FILM COATED ORAL
Qty: 90 TABLET | Refills: 3 | Status: SHIPPED | OUTPATIENT
Start: 2023-01-27 | End: 2023-11-26

## 2023-01-27 NOTE — PROGRESS NOTES
Paxton is a 22 year old who is being evaluated via a billable video visit.      How would you like to obtain your AVS? MyChart  If the video visit is dropped, the invitation should be resent by: Text to cell phone: [phone]  Will anyone else be joining your video visit? No    Assessment & Plan     Hyperglycemia  To prior elevated blood glucose more than 100 so we will check hemoglobin A1c for prediabetes or diabetes.  - Hemoglobin A1c; Future    Anxiety  Symptoms controlled with therapy and sertraline 75mg daily.  Enjoys work as a  and is living with dad, who is very supportive of him.  - Continue therapy  - refill sertraline 75 mg daily  - Atarax 25mg TID prn    Gender dysphoria in adult  Hormone replacement therapy  S/P bilateral mastectomy  Has been on testosterone since 2016, currently 60 mg weekly.  Feels that mood symptoms are well controlled with sertraline and therapy has been struggling with the needles and is interested in alternative methods of testosterone.  Discussed gel versus patches and will start on 2 pumps a day.  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  Questions asked and answered regarding safe disposal of testosterone and injection supplies  -Recheck labs 6 weeks after starting androgen; virtual or in person visit 10 to 14 days later  - testosterone (ANDROGEL 1.62 % PUMP) 20.25 MG/ACT gel; Place 2 Pump onto the skin daily Apply from dispenser to clean, dry, intact skin of the upper arms and shoulders.    No follow-ups on file.    DO NOEL Lujan [facility] SMIROSEMARYS    Subjective   Paxton is a 22 year old presenting for the following health issues:  hormones    HPI          HPI   Paxotn is a 22 year old individual that uses pronouns He/Him/His/Himself that presents today for follow up of:  masculinizing hormone therapy.     Gender identity:  "male    Any special concerns today?  Pellets or gel    On hormones?  YES +++ Shot day of the week? Tuesday/Wednesday      Due for labs?  Yes      +++ Refills of meds needed?  Yes  Still having shot aversion and missing maybe one dose every few weeks    Gender affirmation is being sought in these other ways: hysterectomy this year hopefully  Has a therapist  Still working as an   ---    Past Surgical History:   Procedure Laterality Date     TRANSGENDER MASTECTOMY Bilateral 7/31/2020    Procedure: Bilateral mastectomy with free nipple grafting;  Surgeon: Mono Altamirano MD;  Location: [facility] OR       Patient Active Problem List   Diagnosis     Anxiety     Panic disorder     Gender dysphoria in adult     Hormone replacement therapy       Current Outpatient Medications   Medication Sig Dispense Refill     amoxicillin (AMOXIL) 875 MG tablet Take 1 tablet (875 mg) by mouth 2 times daily 20 tablet 0     B-D LUER-JOAQUIM SYRINGE 25G X 5/8\" 3 ML MISC        hydrOXYzine (ATARAX) 25 MG tablet TAKE 1 TABLET BY MOUTH 3 TIMES DAILY AS NEEDED FOR ANXIETY. 270 tablet 1     needle, disp, 18G X 1\" MISC Use to draw up hormones once weekly 25 each 3     Needle, Disp, 25G X 5/8\" MISC Use to inject hormones weekly 25 each 2     sertraline (ZOLOFT) 25 MG tablet Take one tablet (25mg) by mouth daily with 50mg tablet to total 75mg daily. 90 tablet 3     sertraline (ZOLOFT) 50 MG tablet Take one tablet (50mg) by mouth daily with 25mg tablet to total 75mg daily. 90 tablet 3     syringe, disposable, 1 ML MISC Use to draw up hormones once weekly 25 each 3     testosterone cypionate (DEPOTESTOSTERONE) 200 MG/ML injection Inject 0.3 mLs (60 mg) into the muscle once a week 10 mL 1       History   Smoking Status     Never   Smokeless Tobacco     Never          Review of Systems:        General    Fat redistribution: No    Weight change: YES, some weight gain HEENT    Voice change: No     Cardiovascular (CV)    Chest Pains: No    Shortness " "of breath: No Chest    Decreased exercise tolerance:  No    Breast changes/development: N/A     Gastrointestinal (GI)    Abdominal pain: No    Change in appetite: No Skin    Acne or oily skin: No    Change in hair: No     Genitourinary ()    Abnormal vaginal bleeding: N/A does not menstruate    Change in libido: No    New sexual partners: N/A Musculoskeletal    Leg pain or swelling: No     Psychiatric (Psych)    Depression: YES    Anxiety/Panic: YES    Mood:  \"pretty good\"           Review of Systems   Constitutional, HEENT, cardiovascular, pulmonary, gi and gu systems are negative, except as otherwise noted.      Objective           Vitals:  No vitals were obtained today due to virtual visit.    Physical Exam   GENERAL: Healthy, alert and no distress  EYES: Eyes grossly normal to inspection.  No discharge or erythema, or obvious scleral/conjunctival abnormalities.  RESP: No audible wheeze, cough, or visible cyanosis.  No visible retractions or increased work of breathing.    SKIN: Visible skin clear. No significant rash, abnormal pigmentation or lesions.  NEURO: Cranial nerves grossly intact.  Mentation and speech appropriate for age.  PSYCH: Mentation appears normal, affect normal/bright, judgement and insight intact, normal speech and appearance well-groomed.      Video-Visit Details    Type of service:  Video Visit   Video Start Time: 1:42  Video End Time:2:07 PM    Originating Location (pt. Location): Other work  Distant Location (provider location):  On-site  Platform used for Video Visit: Maksim"

## 2023-01-27 NOTE — PATIENT INSTRUCTIONS
[facility] [place]  Lab and Imaging Center  Floor 1  [address] [place] [address]  Hours:   Monday-Friday: 6:30AM - 5:00PM  Saturday: 7:00AM - 12:00PM  Appointment needed  Phone: [phone]    [facility]  Outpatient Lab  [facility], First Floor  [address] [place] [address]  Hours:   Monday - Friday 7:30a-5:30p  No appointment needed - Walk-Ins available    [facility]  First Floor Draw Station  [address] [address]  Hours:   Monday-Friday 7:30a-5:30p  Appointment needed  Phone: [phone]       Patient Education   Here is the plan from today's visit    1. Hyperglycemia  We will check for prediabetes or diabetes when you get labs checked in 6 weeks  - Hemoglobin A1c; Future    2. Gender dysphoria in adult  3. Hormone replacement therapy  4. S/P bilateral mastectomy  As we discussed, we will switch from testosterone injections to AndroGel.  Once you  the gel, bring your leftover testosterone and injection supplies to any medication drop-off site, commonly found at pharmacies or fire stations.  When transitioning from injectable to gel testosterone, we make our best estimate at equivocal dosing however there is no perfect conversion.  As we discussed, we will recheck your labs in 6 weeks (ordered) and have a follow-up visit 2 weeks later  If you notice any significant vaginal bleeding or it feels as though you have completely stopped testosterone, send me a [employer] message and I can try to figure that out      - testosterone (ANDROGEL 1.62 % PUMP) 20.25 MG/ACT gel; Place 2 Pump onto the skin daily Apply from dispenser to clean, dry, intact skin of the upper arms and shoulders.  Dispense: 75 g; Refill: 1      5. Anxiety  - sertraline (ZOLOFT) 25 MG tablet; Take one tablet (25mg) by mouth daily with 50mg tablet to total 75mg daily.  Dispense: 90 tablet; Refill: 3  -  sertraline (ZOLOFT) 50 MG tablet; Take one tablet (50mg) by mouth daily with 25mg tablet to total 75mg daily.  Dispense: 90 tablet; Refill: 3  - atarax prn              Please call or return to clinic if your symptoms don't go away.    Follow up plan  Return in about 2 months (around 3/27/2023) for Lab Work, Gender Affirming Hormone Therapy.    Thank you for coming to [facility] today.  Lab Testing:  **If you had lab testing today and your results are reassuring or normal they will be mailed to you or sent through ControlScan within 7 days.   **If the lab tests need quick action we will call you with the results.  **If you are having labs done on a different day, please call [phone] to schedule at [facility] or [phone] for other [facility] Outpatient Lab locations. Labs do not offer walk-in appointments.  The phone number we will call with results is # [phone] (home) . If this is not the best number please call our clinic and change the number.  Medication Refills:  If you need any refills please call your pharmacy and they will contact us.   If you need to  your refill at a new pharmacy, please contact the new pharmacy directly. The new pharmacy will help you get your medications transferred faster.   Scheduling:  If you have any concerns about today's visit or wish to schedule another appointment please call our office during normal business hours [phone] (8-5:00 M-F). If you can no longer make a scheduled visit, please cancel via ControlScan or call us to cancel.   If a referral was made to an [facility] specialty provider and you do not get a call from central scheduling, please refer to directions on your visit summary or call our office during normal business hours for assistance.   If a Mammogram was ordered for you at the Breast Center call [phone] to schedule or change your appointment.  If you had an XRay/CT/Ultrasound/MRI ordered the number is [phone] to  schedule or change your radiology appointment.   [facility] has limited ultrasound appointments available on Wednesdays, if you would like your ultrasound at [facility], please call [phone] to schedule.   Medical Concerns:  If you have urgent medical concerns please call [phone] at any time of the day.    Alison Boyd, DO

## 2023-01-30 ENCOUNTER — TELEPHONE (OUTPATIENT)
Dept: FAMILY MEDICINE | Facility: CLINIC | Age: 23
End: 2023-01-30
Payer: COMMERCIAL

## 2023-01-30 DIAGNOSIS — Z79.890 HORMONE REPLACEMENT THERAPY: ICD-10-CM

## 2023-01-30 DIAGNOSIS — F64.0 GENDER DYSPHORIA IN ADULT: ICD-10-CM

## 2023-01-30 RX ORDER — TESTOSTERONE 1.62 MG/G
2 GEL TRANSDERMAL DAILY
Qty: 75 G | Refills: 1 | Status: SHIPPED | OUTPATIENT
Start: 2023-01-30 | End: 2023-04-07

## 2023-01-30 NOTE — TELEPHONE ENCOUNTER
Received a note from [facility] that Dr. Boyd is not authorized to prescribe controlled meds, new script needs to be sent for the Androgel.    Sending the Dr. Boyd faculty    Marissa Clement RN

## 2023-02-02 ENCOUNTER — TELEPHONE (OUTPATIENT)
Dept: FAMILY MEDICINE | Facility: CLINIC | Age: 23
End: 2023-02-02
Payer: COMMERCIAL

## 2023-02-02 NOTE — TELEPHONE ENCOUNTER
Prior Authorization Specialty Medication Request    Medication/Dose: testosterone (ANDROGEL 1.62 % PUMP) 20.25 MG/ACT gel  ICD code (if different than what is on RX):    Previously Tried and Failed:      Important Lab Values:   Rationale:     Insurance Name: [facility]  Insurance ID: [id number]  Insurance Phone Number:     Pharmacy Information (if different than what is on RX)  Name:  [facility] [address] [place], [place] - [facility]  Phone: [phone]    Marissa Clement RN

## 2023-02-07 NOTE — TELEPHONE ENCOUNTER
Central Prior Authorization Team   Phone: [phone]      PA Initiation    Medication: Testosterone  Insurance Company: [employer] - Phone [phone] Fax [phone]  Pharmacy Filling the Rx: [facility] [id number] [place] [facility] [place], [place] - [address]  Filling Pharmacy Phone: [phone]  Filling Pharmacy Fax:    Start Date: 2/7/2023

## 2023-02-07 NOTE — TELEPHONE ENCOUNTER
Prior Authorization Approval    Authorization Effective Date: 1/8/2023  Authorization Expiration Date: 2/6/2028  Medication: Testosterone-APPROVED  Approved Dose/Quantity:   Reference #:     Insurance Company: [employer] - Phone [phone] Fax [phone]  Expected CoPay:       CoPay Card Available:      Foundation Assistance Needed:    Which Pharmacy is filling the prescription (Not needed for infusion/clinic administered): [facility] [address] [place] [address]  Pharmacy Notified: Yes  Patient Notified: No  **Instructed pharmacy to notify patient when script is ready to /ship.**

## 2023-04-08 ENCOUNTER — LAB (OUTPATIENT)
Dept: LAB | Facility: CLINIC | Age: 23
End: 2023-04-08
Payer: COMMERCIAL

## 2023-04-08 DIAGNOSIS — Z79.890 HORMONE REPLACEMENT THERAPY: ICD-10-CM

## 2023-04-08 DIAGNOSIS — R73.9 HYPERGLYCEMIA: ICD-10-CM

## 2023-04-08 LAB
CHOLEST SERPL-MCNC: 179 MG/DL
FASTING STATUS PATIENT QL REPORTED: ABNORMAL
GLUCOSE SERPL-MCNC: 102 MG/DL (ref 70–99)
HBA1C MFR BLD: 5.6 %
HDLC SERPL-MCNC: 49 MG/DL
HGB BLD-MCNC: 15.9 G/DL (ref 11.7–17.7)
LDLC SERPL CALC-MCNC: 112 MG/DL
NONHDLC SERPL-MCNC: 130 MG/DL
TRIGL SERPL-MCNC: 91 MG/DL

## 2023-04-08 PROCEDURE — 80061 LIPID PANEL: CPT | Performed by: PATHOLOGY

## 2023-04-08 PROCEDURE — 82947 ASSAY GLUCOSE BLOOD QUANT: CPT | Performed by: PATHOLOGY

## 2023-04-08 PROCEDURE — 85018 HEMOGLOBIN: CPT | Performed by: PATHOLOGY

## 2023-04-08 PROCEDURE — 83036 HEMOGLOBIN GLYCOSYLATED A1C: CPT | Mod: 90 | Performed by: PATHOLOGY

## 2023-04-08 PROCEDURE — 99000 SPECIMEN HANDLING OFFICE-LAB: CPT | Performed by: PATHOLOGY

## 2023-04-08 PROCEDURE — 84403 ASSAY OF TOTAL TESTOSTERONE: CPT | Mod: 90 | Performed by: PATHOLOGY

## 2023-04-08 PROCEDURE — 36415 COLL VENOUS BLD VENIPUNCTURE: CPT | Performed by: PATHOLOGY

## 2023-04-11 LAB — TESTOST SERPL-MCNC: 345 NG/DL (ref 8–950)

## 2023-04-13 DIAGNOSIS — Z79.890 HORMONE REPLACEMENT THERAPY: ICD-10-CM

## 2023-04-13 DIAGNOSIS — F64.0 GENDER DYSPHORIA IN ADULT: Primary | ICD-10-CM

## 2023-04-13 RX ORDER — TESTOSTERONE 1.62 MG/G
3 GEL TRANSDERMAL DAILY
Qty: 75 G | Refills: 0 | Status: SHIPPED | OUTPATIENT
Start: 2023-04-13 | End: 2023-05-11

## 2023-05-06 ENCOUNTER — HEALTH MAINTENANCE LETTER (OUTPATIENT)
Age: 23
End: 2023-05-06

## 2023-07-15 ENCOUNTER — LAB (OUTPATIENT)
Dept: LAB | Facility: CLINIC | Age: 23
End: 2023-07-15
Payer: COMMERCIAL

## 2023-07-15 DIAGNOSIS — Z79.890 HORMONE REPLACEMENT THERAPY: ICD-10-CM

## 2023-07-15 DIAGNOSIS — F64.0 GENDER DYSPHORIA IN ADULT: ICD-10-CM

## 2023-07-15 LAB
CHOLEST SERPL-MCNC: 191 MG/DL
FASTING STATUS PATIENT QL REPORTED: YES
GLUCOSE SERPL-MCNC: 89 MG/DL (ref 70–99)
HDLC SERPL-MCNC: 50 MG/DL
HGB BLD-MCNC: 15.8 G/DL (ref 11.7–17.7)
LDLC SERPL CALC-MCNC: 119 MG/DL
NONHDLC SERPL-MCNC: 141 MG/DL
TRIGL SERPL-MCNC: 112 MG/DL

## 2023-07-15 PROCEDURE — 82947 ASSAY GLUCOSE BLOOD QUANT: CPT | Performed by: PATHOLOGY

## 2023-07-15 PROCEDURE — 80061 LIPID PANEL: CPT | Performed by: PATHOLOGY

## 2023-07-15 PROCEDURE — 84403 ASSAY OF TOTAL TESTOSTERONE: CPT | Performed by: FAMILY MEDICINE

## 2023-07-15 PROCEDURE — 85018 HEMOGLOBIN: CPT | Performed by: PATHOLOGY

## 2023-07-15 PROCEDURE — 99000 SPECIMEN HANDLING OFFICE-LAB: CPT | Performed by: PATHOLOGY

## 2023-07-15 PROCEDURE — 36415 COLL VENOUS BLD VENIPUNCTURE: CPT | Performed by: PATHOLOGY

## 2023-07-20 LAB — TESTOST SERPL-MCNC: 537 NG/DL (ref 8–950)

## 2023-08-07 ENCOUNTER — VIRTUAL VISIT (OUTPATIENT)
Dept: FAMILY MEDICINE | Facility: CLINIC | Age: 23
End: 2023-08-07
Payer: COMMERCIAL

## 2023-08-07 DIAGNOSIS — Z79.890 HORMONE REPLACEMENT THERAPY: ICD-10-CM

## 2023-08-07 DIAGNOSIS — F41.0 PANIC DISORDER: ICD-10-CM

## 2023-08-07 DIAGNOSIS — F41.9 ANXIETY: ICD-10-CM

## 2023-08-07 DIAGNOSIS — F64.0 GENDER DYSPHORIA IN ADULT: Primary | ICD-10-CM

## 2023-08-07 DIAGNOSIS — N95.2 ATROPHY OF VAGINA: ICD-10-CM

## 2023-08-07 PROCEDURE — 99214 OFFICE O/P EST MOD 30 MIN: CPT | Mod: VID

## 2023-08-07 RX ORDER — HYDROXYZINE HYDROCHLORIDE 25 MG/1
25 TABLET, FILM COATED ORAL 3 TIMES DAILY PRN
Qty: 180 TABLET | Refills: 3 | Status: SHIPPED | OUTPATIENT
Start: 2023-08-07

## 2023-08-07 RX ORDER — ESTRADIOL 10 UG/1
20 INSERT VAGINAL DAILY
Qty: 14 TABLET | Refills: 0 | Status: SHIPPED | OUTPATIENT
Start: 2023-08-07 | End: 2023-08-14

## 2023-08-07 RX ORDER — BNT162B2 ORIGINAL AND OMICRON BA.4/BA.5 .1125; .1125 MG/2.25ML; MG/2.25ML
INJECTION, SUSPENSION INTRAMUSCULAR
COMMUNITY
Start: 2022-10-21 | End: 2024-03-27

## 2023-08-07 RX ORDER — TESTOSTERONE 1.62 MG/G
3 GEL TRANSDERMAL DAILY
Qty: 150 G | Refills: 0 | Status: CANCELLED | OUTPATIENT
Start: 2023-10-06

## 2023-08-07 RX ORDER — TESTOSTERONE 1.62 MG/G
3 GEL TRANSDERMAL DAILY
Qty: 150 G | Refills: 0 | Status: CANCELLED | OUTPATIENT
Start: 2023-09-06

## 2023-08-07 RX ORDER — SERTRALINE HYDROCHLORIDE 25 MG/1
75 TABLET, FILM COATED ORAL DAILY
Qty: 270 TABLET | Refills: 3 | Status: SHIPPED | OUTPATIENT
Start: 2023-08-07 | End: 2023-11-20

## 2023-08-07 RX ORDER — TESTOSTERONE 1.62 MG/G
3 GEL TRANSDERMAL DAILY
Qty: 150 G | Refills: 12 | Status: SHIPPED | OUTPATIENT
Start: 2023-08-07 | End: 2024-03-01

## 2023-08-07 ASSESSMENT — ANXIETY QUESTIONNAIRES
GAD7 TOTAL SCORE: 2
4. TROUBLE RELAXING: SEVERAL DAYS
2. NOT BEING ABLE TO STOP OR CONTROL WORRYING: NOT AT ALL
7. FEELING AFRAID AS IF SOMETHING AWFUL MIGHT HAPPEN: NOT AT ALL
IF YOU CHECKED OFF ANY PROBLEMS ON THIS QUESTIONNAIRE, HOW DIFFICULT HAVE THESE PROBLEMS MADE IT FOR YOU TO DO YOUR WORK, TAKE CARE OF THINGS AT HOME, OR GET ALONG WITH OTHER PEOPLE: NOT DIFFICULT AT ALL
GAD7 TOTAL SCORE: 2
1. FEELING NERVOUS, ANXIOUS, OR ON EDGE: NOT AT ALL
3. WORRYING TOO MUCH ABOUT DIFFERENT THINGS: SEVERAL DAYS
6. BECOMING EASILY ANNOYED OR IRRITABLE: NOT AT ALL
5. BEING SO RESTLESS THAT IT IS HARD TO SIT STILL: NOT AT ALL

## 2023-08-07 ASSESSMENT — PATIENT HEALTH QUESTIONNAIRE - PHQ9: SUM OF ALL RESPONSES TO PHQ QUESTIONS 1-9: 3

## 2023-08-07 NOTE — PROGRESS NOTES
Paxton Meredith is being evaluated via a billable video visit.      Video-Visit Details  Type of service:  Video Visit  Video Start Time: 1:33 PM  Video End Time (time video stopped): 2:02 PM    Originating Location (pt. Location): Work, [facility]  Distant Location (provider location):  [facility]   Mode of Communication:  Video Conference via [facility]    Assessment & Plan     Paxton is a 23 year old patient who presents for the following issues:    Anxiety  Panic disorder  Chronic. Well controlled on sertraline 75mg daily and hydroxyzine 25mg PRN for panic attacks. Goes to therapy PRN. No changes made today. Follow up sooner than 12 months if symptoms worsening.   - hydrOXYzine (ATARAX) 25 MG tablet  Dispense: 180 tablet; Refill: 3  - sertraline (ZOLOFT) 25 MG tablet  Dispense: 270 tablet; Refill: 3    Gender dysphoria in adult  Hormone replacement therapy  Doing well, no changes made today. Started injected testosterone in 2016, switched to gel 1/27/23, currently on androgel 60mg (3 pumps) / day since 5/11/23. S/p top surgery 2020, no upcoming surgeries planned but desires hysterectomy, not sexually active.   Plan:  - Continue androgel 1.62%, 20.25mg/ACT gel 3 pumps daily (60mg)  - Lab only appointment in 12 months, then shortly after to discuss results  - Will write letter of support for hysterectomy when needed  - Due for pap, premedicate with vaginal estrogen x7 days, prescribe 1 time dose of benzodiazepine for procedure. Patient will send [place] message when scheduled, plan to prescribe at that time.     Recommended health maintenance   Reccomended health maintenance, masc HRT: On masculinizing HRT, so the following are recommended for routine health maintenance:   Cervix present, follow reccomendations for all people with cervixes regardless of gender or HRT history.   Premedicate with vaginal estrogens for 1-2 weeks prior to the exam   Requisition should  indicate any testosterone use as well as the presence of amenorrhea, to allow the pathologist can accurately interpret cell morphology.  Next due: now  Post top surgery: no routine screening needed    Options for treatment and follow-up care were reviewed with the patient.Pxaton engaged in the decision making process and verbalized understanding of the options discussed and agreed with the final plan. Counselled patient about controlled substances, never share. Contraception discussed. Educated about testosterone as absolute contraindication in pregnancy. Discussed routine health maintenance for people on HRT. Understanding of risks and benefits of HRT. Doing well overall.     No orders of the defined types were placed in this encounter.    Post Medication Reconciliation Status: medications reconciled and changed, per note/orders    Return in about 1 year (around 8/7/2024) for Follow up, with me.    Karuna Talley MD  [facility]    Subjective   HPI   This is a 23 year old patient, presenting for the following health issues:    No chief complaint on file.    #Gender affirming care  Overall, things are going well  Enjoying being on the gel  No chest pain, no SOB  No acne  Masculinizing changes have been stable - no new changes, no regression of changes  Due for pap  Not sexually active  Upcoming surgery? No, desires hysterectomy in the future    #Mental health  Mood has been good. Takes sertraline and hydroxyzine. No panic attacks  Has therapist, sees PRN        7/17/2020     4:49 PM 5/13/2022     1:18 PM 8/7/2023     1:00 PM   PHQ   PHQ-9 Total Score 11 2 3   Q9: Thoughts of better off dead/self-harm past 2 weeks Not at all Not at all Not at all         3/25/2017     8:52 AM 8/8/2019     1:07 PM 8/7/2023     1:00 PM   ADRIA-7 SCORE   Total Score 17 18 2       Review of Systems   Constitutional, HEENT, cardiovascular, pulmonary, gi and gu systems are negative, except as otherwise noted.      Objective     There were no vitals taken for this visit.  Wt Readings from Last 4 Encounters:   05/21/22 80.3 kg (177 lb)   12/07/20 75.9 kg (167 lb 6.4 oz)   07/31/20 67 kg (147 lb 11.3 oz)   07/21/20 68 kg (150 lb)       Physical Exam    General: Alert and oriented, in no acute distress.  CV: No cyanosis or pallor, warm and well perfused.  Respiratory: No respiratory distress, no accessory muscle use.  Psychiatric: Mood and affect appear normal.       Results from last visit:  Lab on 07/15/2023   Component Date Value Ref Range Status    Hemoglobin 07/15/2023 15.8  11.7 - 17.7 g/dL Final    Sex Specific Reference Ranges:    Female  11.7-15.7  g/dL  Male    13.3-17.7   g/dL      Cholesterol 07/15/2023 191  <200 mg/dL Final    Triglycerides 07/15/2023 112  <150 mg/dL Final    Direct Measure HDL 07/15/2023 50  >=40 mg/dL Final    LDL Cholesterol Calculated 07/15/2023 119 (H)  <=100 mg/dL Final    Non HDL Cholesterol 07/15/2023 141 (H)  <130 mg/dL Final    Glucose 07/15/2023 89  70 - 99 mg/dL Final    Patient Fasting > 8hrs? 07/15/2023 Yes   Final    Testosterone Total 07/15/2023 537  8 - 950 ng/dL Final       Karuna Talley MD on 8/7/2023 at 1:26 PM    ----- Service Performed and Documented by Resident or Fellow ------            .

## 2023-08-07 NOTE — PATIENT INSTRUCTIONS
Thank you for coming to [facility] today.  Here is the plan from today's visit  Continue 75mg sertraline daily  Looks like insurance doesn't cover the 50mg tablets now, so I prescribed the 25mg tablets. Take 3 / day  Continue 3 pumps of the 1.62% androgel daily  Noted to pharmacy that they should give you 2 of the 75mg bottles to cover the 30 days  Schedule pap  Pretreatment with vaginal estrogen: place 2 tablets of 10 mcg estradiol once/night for 7 nights, stop 2 days before pap   Message me when scheduled so I can prescribe the anxiety medication   Lab Testing:  **If you had lab testing today and your results are reassuring or normal they will be mailed to you or sent through nGame within 7 days.   **If the lab tests need quick action we will call you with the results.  **If you are having labs done on a different day, please call [phone] to schedule at [facility] or [phone] for other [facility] locations. Labs do not offer walk-in appointments.  The phone number we will call with results is # [phone] (home) . If this is not the best number please call our clinic and change the number.  Medication Refills:  If you need any refills please call your pharmacy and they will contact us.   If you need to  your refill at a new pharmacy, please contact the new pharmacy directly. The new pharmacy will help you get your medications transferred faster.   Scheduling:  If a referral was made to an [facility] specialty provider and you do not get a call from central scheduling, please refer to directions on your visit summary or call our office during normal business hours for assistance: [phone] (8-5:00 M-F)  If you have any concerns about today's visit or wish to schedule another appointment please call our office during normal business hours [phone] (8-5:00 M-F)  If a Mammogram was ordered for you at [facility] call [phone] to schedule or change  your appointment.  If you had an XRay/CT/Ultrasound/MRI ordered the number is [phone] to schedule or change your radiology appointment.   [facility] has limited ultrasound appointments available on Wednesdays, if you would like your ultrasound at [facility], please call [phone] to schedule.   Medical Concerns:  If you have urgent medical concerns please call [phone] at any time of the day.    Karuna Talley MD       [facility]: [facility]    Masculinizing HRT Timeline          *if you have a uterus, you can get pregnant while on masculinizing hormones. Masculinizing hormones are not birth control, and they can affect the development of the fetus. If you are trying to get pregnant, you should stop masculinizing hormones and can choose to start therapy again at a later time. The effect of masculinizing hormone therapy on ovaries varies from person to person and is unknown.  **it may be possible to prevent and treat scalp hair loss

## 2023-08-07 NOTE — PROGRESS NOTES
Preceptor Attestation:   Patient seen, evaluated and discussed with the resident. I have verified the content of the note, which accurately reflects my assessment of the patient and the plan of care.   Supervising Physician:  Lelo Caro, DO

## 2023-11-20 ENCOUNTER — OFFICE VISIT (OUTPATIENT)
Dept: MIDWIFE SERVICES | Facility: CLINIC | Age: 23
End: 2023-11-20
Payer: COMMERCIAL

## 2023-11-20 VITALS
SYSTOLIC BLOOD PRESSURE: 112 MMHG | DIASTOLIC BLOOD PRESSURE: 78 MMHG | HEIGHT: 62 IN | BODY MASS INDEX: 33.49 KG/M2 | WEIGHT: 182 LBS

## 2023-11-20 DIAGNOSIS — Z11.3 ROUTINE SCREENING FOR STI (SEXUALLY TRANSMITTED INFECTION): ICD-10-CM

## 2023-11-20 DIAGNOSIS — N93.9 EPISODE OF HEAVY VAGINAL BLEEDING: ICD-10-CM

## 2023-11-20 DIAGNOSIS — Z12.4 ROUTINE PAPANICOLAOU SMEAR: Primary | ICD-10-CM

## 2023-11-20 LAB
ERYTHROCYTE [DISTWIDTH] IN BLOOD BY AUTOMATED COUNT: 11.7 % (ref 10–15)
HCT VFR BLD AUTO: 45 % (ref 35–53)
HGB BLD-MCNC: 14.9 G/DL (ref 11.7–17.7)
MCH RBC QN AUTO: 30.6 PG (ref 26.5–33)
MCHC RBC AUTO-ENTMCNC: 33.1 G/DL (ref 31.5–36.5)
MCV RBC AUTO: 92 FL (ref 78–100)
PLATELET # BLD AUTO: 270 10E3/UL (ref 150–450)
RBC # BLD AUTO: 4.87 10E6/UL (ref 3.8–5.9)
WBC # BLD AUTO: 6.5 10E3/UL (ref 4–11)

## 2023-11-20 PROCEDURE — G0145 SCR C/V CYTO,THINLAYER,RESCR: HCPCS | Performed by: ADVANCED PRACTICE MIDWIFE

## 2023-11-20 PROCEDURE — 86780 TREPONEMA PALLIDUM: CPT | Performed by: ADVANCED PRACTICE MIDWIFE

## 2023-11-20 PROCEDURE — 87389 HIV-1 AG W/HIV-1&-2 AB AG IA: CPT | Performed by: ADVANCED PRACTICE MIDWIFE

## 2023-11-20 PROCEDURE — 99203 OFFICE O/P NEW LOW 30 MIN: CPT | Performed by: ADVANCED PRACTICE MIDWIFE

## 2023-11-20 PROCEDURE — 87491 CHLMYD TRACH DNA AMP PROBE: CPT | Performed by: ADVANCED PRACTICE MIDWIFE

## 2023-11-20 PROCEDURE — 36415 COLL VENOUS BLD VENIPUNCTURE: CPT | Performed by: ADVANCED PRACTICE MIDWIFE

## 2023-11-20 PROCEDURE — 87591 N.GONORRHOEAE DNA AMP PROB: CPT | Performed by: ADVANCED PRACTICE MIDWIFE

## 2023-11-20 PROCEDURE — 85027 COMPLETE CBC AUTOMATED: CPT | Performed by: ADVANCED PRACTICE MIDWIFE

## 2023-11-20 ASSESSMENT — ANXIETY QUESTIONNAIRES
2. NOT BEING ABLE TO STOP OR CONTROL WORRYING: SEVERAL DAYS
IF YOU CHECKED OFF ANY PROBLEMS ON THIS QUESTIONNAIRE, HOW DIFFICULT HAVE THESE PROBLEMS MADE IT FOR YOU TO DO YOUR WORK, TAKE CARE OF THINGS AT HOME, OR GET ALONG WITH OTHER PEOPLE: SOMEWHAT DIFFICULT
1. FEELING NERVOUS, ANXIOUS, OR ON EDGE: MORE THAN HALF THE DAYS
6. BECOMING EASILY ANNOYED OR IRRITABLE: NOT AT ALL
5. BEING SO RESTLESS THAT IT IS HARD TO SIT STILL: NOT AT ALL
7. FEELING AFRAID AS IF SOMETHING AWFUL MIGHT HAPPEN: NOT AT ALL
GAD7 TOTAL SCORE: 5
3. WORRYING TOO MUCH ABOUT DIFFERENT THINGS: SEVERAL DAYS
GAD7 TOTAL SCORE: 5

## 2023-11-20 ASSESSMENT — PATIENT HEALTH QUESTIONNAIRE - PHQ9
SUM OF ALL RESPONSES TO PHQ QUESTIONS 1-9: 4
5. POOR APPETITE OR OVEREATING: SEVERAL DAYS

## 2023-11-20 NOTE — PROGRESS NOTES
SUBJECTIVE:                                                   Paxton Meredith is a 23 year old who presents to clinic today for the following health issue(s):  Patient presents with:  Physical      HPI:  Paxton is here for a pap smear. His last pap was in 9/2019 and NILM.     Patient's last menstrual period was 11/13/2023 (approximate).  Menstrual History: missed some doses of Testosterone and had a period this month which was very heavy for 4 days. Agreeable to CBC to check for anemia.    No obstetric history on file..  Using none for contraception.   Health maintenance updated:  yes  STI infx testing offered:  Accepted    Last PHQ-9 score on record =       11/20/2023     1:33 PM   PHQ-9 SCORE   PHQ-9 Total Score 4     Last GAD7 score on record =       11/20/2023     1:33 PM   ADRIA-7 SCORE   Total Score 5       Problem list and histories reviewed & adjusted, as indicated.  Additional history: as documented.    Patient Active Problem List   Diagnosis    Anxiety    Panic disorder    Gender dysphoria in adult    Hormone replacement therapy    S/P bilateral mastectomy     Past Surgical History:   Procedure Laterality Date    TRANSGENDER MASTECTOMY Bilateral 7/31/2020    Procedure: Bilateral mastectomy with free nipple grafting;  Surgeon: Mono Altamirano MD;  Location:  OR      Social History     Tobacco Use    Smoking status: Never    Smokeless tobacco: Never   Substance Use Topics    Alcohol use: No      Problem (# of Occurrences) Relation (Name,Age of Onset)    Arthritis (1) Father    Diabetes (1) Maternal Grandmother    Hypertension (1) Paternal Grandfather    Melanoma (1) Other    Leukemia (1) Paternal Grandmother    Skin Cancer (1) Other              Current Outpatient Medications   Medication Sig    hydrOXYzine (ATARAX) 25 MG tablet Take 1 tablet (25 mg) by mouth 3 times daily as needed for anxiety    PFIZER COVID-19 VAC BIVALENT 30 MCG/0.3ML injection     sertraline (ZOLOFT) 25 MG tablet Take one tablet  "(25mg) by mouth daily with 50mg tablet to total 75mg daily.    sertraline (ZOLOFT) 50 MG tablet Take one tablet (50mg) by mouth daily with 25mg tablet to total 75mg daily.    testosterone (ANDROGEL 1.62 % PUMP) 20.25 MG/ACT gel Place 3 Pump onto the skin daily Apply from dispenser to clean, dry, intact skin of the upper arms and shoulders.     No current facility-administered medications for this visit.     No Known Allergies    ROS:  CONSTITUTIONAL: NEGATIVE for fever, chills, change in weight  : NEGATIVE for unusual urinary or vaginal symptoms. Periods are irregular with the use of testosterone.  PSYCHIATRIC: NEGATIVE for changes in mood or affect    OBJECTIVE:     /78   Ht 1.575 m (5' 2\")   Wt 82.6 kg (182 lb)   LMP 11/13/2023 (Approximate)   BMI 33.29 kg/m    Body mass index is 33.29 kg/m .    PHYSICAL EXAM:  Constitutional:  Appearance: Well nourished, well developed alert, in no acute distress  Pelvic Exam:  External Genitalia:     Normal appearance for age, no discharge present, no tenderness present, no inflammatory lesions present, color normal  Vagina:     Normal vaginal vault without central or paravaginal defects, no discharge present, no inflammatory lesions present, no masses present. GC/Chlam swab collected.   Bladder:     Nontender to palpation  Urethra:   Urethral Body:  Urethra palpation normal, urethra structural support normal   Urethral Meatus:  No erythema or lesions present  Cervix:     Appearance healthy, no lesions present, nontender to palpation, no bleeding present. Pap smear collected.   Uterus:     Uterus: firm, normal sized and non tender  Adnexa:     No adnexal tenderness present, no adnexal masses present  Perineum:     Perineum within normal limits, no evidence of trauma, no rashes or skin lesions present  Anus:     Anus within normal limits, no hemorrhoids present  In-Clinic Test Results:  No results found for this or any previous visit (from the past 24 " hour(s)).    ASSESSMENT/PLAN:                                                        ICD-10-CM    1. Routine Papanicolaou smear  Z12.4       2. Routine screening for STI (sexually transmitted infection)  Z11.3 NEISSERIA GONORRHOEA PCR     CHLAMYDIA TRACHOMATIS PCR     HIV Antigen Antibody Combo     Treponema Abs w Reflex to RPR and Titer      3. Episode of heavy vaginal bleeding  N93.9 Ob/Gyn  Referral     CBC with platelets          PLAN:  Pap smear and STI testing today. Consultation to OB/GYN for hysterectomy per Paxton's request.     Mishel Alexander CNM

## 2023-11-20 NOTE — NURSING NOTE
"Chief Complaint   Patient presents with    Physical       Initial /78   Ht 1.575 m (5' 2\")   Wt 82.6 kg (182 lb)   LMP 11/13/2023 (Approximate)   BMI 33.29 kg/m   Estimated body mass index is 33.29 kg/m  as calculated from the following:    Height as of this encounter: 1.575 m (5' 2\").    Weight as of this encounter: 82.6 kg (182 lb).  BP completed using cuff size: regular    Questioned patient about current smoking habits.  Pt. has never smoked.      No obstetric history on file.    Kirti Diggs LPN on 11/20/2023 at 1:11 PM                "

## 2023-11-21 LAB
C TRACH DNA SPEC QL NAA+PROBE: NEGATIVE
HIV 1+2 AB+HIV1 P24 AG SERPL QL IA: NONREACTIVE
N GONORRHOEA DNA SPEC QL NAA+PROBE: NEGATIVE
T PALLIDUM AB SER QL: NONREACTIVE

## 2023-11-24 LAB
BKR LAB AP GYN ADEQUACY: NORMAL
BKR LAB AP GYN INTERPRETATION: NORMAL
BKR LAB AP HPV REFLEX: NORMAL
BKR LAB AP LMP: NORMAL
BKR LAB AP PREVIOUS ABNORMAL: NORMAL
PATH REPORT.COMMENTS IMP SPEC: NORMAL
PATH REPORT.COMMENTS IMP SPEC: NORMAL
PATH REPORT.RELEVANT HX SPEC: NORMAL

## 2023-12-05 NOTE — PATIENT INSTRUCTIONS
Here is the plan from today's visit    1. Anxiety disorder, unspecified type    - increase escitalopram (LEXAPRO) 10 MG tablet; Take 2 tablets (20 mg) by mouth daily Take one half  Tablet for 4 days then whole tablet daily  Dispense: 30 tablet; Refill: 1  - discussed therapy, no desired at this point      Please call or return to clinic if your symptoms don't go away.    Follow up plan  Please make a clinic appointment for follow up with me (KWAN ABREU) in 2-3  weeks for depression and anxiety follow up as well as for HRT.    Thank you for coming to [facility] today.  Lab Testing:  **If you had lab testing today and your results are reassuring or normal they will be mailed to you or sent through [employer] within 7 days.   **If the lab tests need quick action we will call you with the results.  The phone number we will call with results is # [phone] (home) . If this is not the best number please call our clinic and change the number.  Medication Refills:  If you need any refills please call your pharmacy and they will contact us.   If you need to  your refill at a new pharmacy, please contact the new pharmacy directly. The new pharmacy will help you get your medications transferred faster.   Scheduling:  If you have any concerns about today's visit or wish to schedule another appointment please call our office during normal business hours [phone] (8-5:00 M-F)  If a referral was made to a [facility] and you don't get a call from central scheduling please call [phone].  If a Mammogram was ordered for you at [facility] call [phone] to schedule or change your appointment.  If you had an XRay/CT/Ultrasound/MRI ordered the number is [phone] to schedule or change your radiology appointment.   Medical Concerns:  If you have urgent medical concerns please call [phone] at any time of the day.    
[employer]

## 2023-12-11 NOTE — MR AVS SNAPSHOT
After Visit Summary   3/20/2017    Varsha Meredith    MRN: [id number]           Patient Information     Date Of Birth          2000        Visit Information        Provider Department      3/20/2017 3:40 PM Kwan Holguin MD [facility]        Today's Diagnoses     Anxiety disorder, unspecified type          Care Instructions    Here is the plan from today's visit    1. Anxiety disorder, unspecified type    - increase escitalopram (LEXAPRO) 10 MG tablet; Take 2 tablets (20 mg) by mouth daily Take one half  Tablet for 4 days then whole tablet daily  Dispense: 30 tablet; Refill: 1  - discussed therapy, no desired at this point      Please call or return to clinic if your symptoms don't go away.    Follow up plan  Please make a clinic appointment for follow up with me (KWAN HOLGUIN) in 2-3  weeks for depression and anxiety follow up as well as for HRT.    Thank you for coming to [facility] today.  Lab Testing:  **If you had lab testing today and your results are reassuring or normal they will be mailed to you or sent through [employer] within 7 days.   **If the lab tests need quick action we will call you with the results.  The phone number we will call with results is # [phone] (home) . If this is not the best number please call our clinic and change the number.  Medication Refills:  If you need any refills please call your pharmacy and they will contact us.   If you need to  your refill at a new pharmacy, please contact the new pharmacy directly. The new pharmacy will help you get your medications transferred faster.   Scheduling:  If you have any concerns about today's visit or wish to schedule another appointment please call our office during normal business hours [phone] (8-5:00 M-F)  If a referral was made to a [facility] and you don't get a call from central scheduling please call [phone].  If a Mammogram was  First interaction with patient. Assumed care at this time. Agree with triage assessment.     Pt tearful. Skin wwp. Yellow drainage from R ear, gauze in place. Per mom, pt had ear tubes placed at age 1, but they fell out around age 2.5. Pt has only had about 2 ear infx since.     Gown provided, patient instructed to changed prior to ERP evaluation.  NPO status explained by this RN.  Call light provided.  Chart up for ERP.     ordered for you at [facility] call [phone] to schedule or change your appointment.  If you had an XRay/CT/Ultrasound/MRI ordered the number is [phone] to schedule or change your radiology appointment.   Medical Concerns:  If you have urgent medical concerns please call [phone] at any time of the day.          Follow-ups after your visit        Who to contact     Please call your clinic at [phone] to:    Ask questions about your health    Make or cancel appointments    Discuss your medicines    Learn about your test results    Speak to your doctor   If you have compliments or concerns about an experience at your clinic, or if you wish to file a complaint, please contact [facility] Patient Relations at [phone] or email us at [email or web address]         Additional Information About Your Visit        TopDeej[employer]hart Information     [email or web address] gives you secure access to your electronic health record. If you see a primary care provider, you can also send messages to your care team and make appointments. If you have questions, please call your primary care clinic.  If you do not have a primary care provider, please call [phone] and they will assist you.      [email or web address] is an electronic gateway that provides easy, online access to your medical records. With [email or web address], you can request a clinic appointment, read your test results, renew a prescription or communicate with your care team.     To access your existing account, please contact your [facility] or call [phone] for assistance.        Care EveryWhere ID     This is your Care EveryWhere ID. This could be used by other organizations to access your [facility] medical records  [id number]        Your Vitals Were     Pulse Temperature Respirations Pulse Oximetry Breastfeeding? BMI (Body Mass Index)    81 98.4  F (36.9  C) (Oral) 18 97% No 24.33 kg/m2       Blood Pressure from Last  3 Encounters:   03/20/17 112/69   01/03/17 97/62   11/30/16 109/68    Weight from Last 3 Encounters:   03/20/17 133 lb (60.3 kg) (64 %)*   01/03/17 135 lb (61.2 kg) (67 %)*   11/30/16 142 lb 9.6 oz (64.7 kg) (77 %)*     * Growth percentiles are based on [facility] 2-20 Years data.              Today, you had the following     No orders found for display         Today's Medication Changes          These changes are accurate as of: 3/20/17  4:46 PM.  If you have any questions, ask your nurse or doctor.               These medicines have changed or have updated prescriptions.        Dose/Directions    escitalopram 10 MG tablet   Commonly known as:  LEXAPRO   This may have changed:  how much to take   Used for:  Anxiety disorder, unspecified type   Changed by:  Amee Holguin MD        Dose:  20 mg   Take 2 tablets (20 mg) by mouth daily Take one half  Tablet for 4 days then whole tablet daily   Quantity:  30 tablet   Refills:  1            Where to get your medicines      These medications were sent to [email or web address] [address] [place] [address] [address] AT [facility] [address]  [address], [facility] [address]    Hours:  24-hours Phone:  [phone]     escitalopram 10 MG tablet                Primary Care Provider Office Phone # Fax #    Amee Holguin [phone]       [facility] [address] [facility] [address]        Thank you!     Thank you for choosing [facility]  for your care. Our goal is always to provide you with excellent care. Hearing back from our patients is one way we can continue to improve our services. Please take a few minutes to complete the written survey that you may receive in the mail after your visit with us. Thank you!             Your Updated Medication List - Protect others around you: Learn how to safely use, store and throw away your medicines at www.disposemymeds.org.          This list is accurate as  of: 3/20/17  4:46 PM.  Always use your most recent med list.                   Brand Name Dispense Instructions for use    escitalopram 10 MG tablet    LEXAPRO    30 tablet    Take 2 tablets (20 mg) by mouth daily Take one half  Tablet for 4 days then whole tablet daily

## 2024-03-01 ENCOUNTER — MYC REFILL (OUTPATIENT)
Dept: FAMILY MEDICINE | Facility: CLINIC | Age: 24
End: 2024-03-01
Payer: COMMERCIAL

## 2024-03-01 DIAGNOSIS — F41.0 PANIC DISORDER: ICD-10-CM

## 2024-03-01 DIAGNOSIS — F41.9 ANXIETY: ICD-10-CM

## 2024-03-01 DIAGNOSIS — F64.0 GENDER DYSPHORIA IN ADULT: ICD-10-CM

## 2024-03-01 DIAGNOSIS — Z79.890 HORMONE REPLACEMENT THERAPY: ICD-10-CM

## 2024-03-01 NOTE — TELEPHONE ENCOUNTER
"Request for medication refill:  sertraline (ZOLOFT) 25 MG tablet     Providers if patient needs an appointment and you are willing to give a one month supply please refill for one month and  send a letter/MyChart using \".SMILLIMITEDREFILL\" .smillimited and route chart to \"P [facility] \" (Giving one month refill in non controlled medications is strongly recommended before denial)    If refill has been denied, meaning absolutely no refills without visit, please complete the smart phrase \".smirxrefuse\" and route it to the \"P [facility] MED REFILLS\"  pool to inform the patient and the pharmacy.    Addis Henriquez, [facility]      "

## 2024-03-04 RX ORDER — TESTOSTERONE 1.62 MG/G
3 GEL TRANSDERMAL DAILY
Qty: 150 G | Refills: 12 | Status: SHIPPED | OUTPATIENT
Start: 2024-03-04 | End: 2024-03-27 | Stop reason: ALTCHOICE

## 2024-03-04 RX ORDER — SERTRALINE HYDROCHLORIDE 25 MG/1
TABLET, FILM COATED ORAL
Qty: 90 TABLET | Refills: 3 | Status: SHIPPED | OUTPATIENT
Start: 2024-03-04

## 2024-03-04 NOTE — TELEPHONE ENCOUNTER
"Request for medication refill:  testosterone (ANDROGEL 1.62 % PUMP) 20.25 MG/ACT gel     Providers if patient needs an appointment and you are willing to give a one month supply please refill for one month and  send a letter/MyChart using \".SMILLIMITEDREFILL\" .smillimited and route chart to \"P [facility] \" (Giving one month refill in non controlled medications is strongly recommended before denial)    If refill has been denied, meaning absolutely no refills without visit, please complete the smart phrase \".smirxrefuse\" and route it to the \"P [facility] MED REFILLS\"  pool to inform the patient and the pharmacy.    Addis Henriquez CMA      "

## 2024-03-21 SDOH — HEALTH STABILITY: PHYSICAL HEALTH: ON AVERAGE, HOW MANY MINUTES DO YOU ENGAGE IN EXERCISE AT THIS LEVEL?: 20 MIN

## 2024-03-21 SDOH — HEALTH STABILITY: PHYSICAL HEALTH: ON AVERAGE, HOW MANY DAYS PER WEEK DO YOU ENGAGE IN MODERATE TO STRENUOUS EXERCISE (LIKE A BRISK WALK)?: 2 DAYS

## 2024-03-21 ASSESSMENT — SOCIAL DETERMINANTS OF HEALTH (SDOH): HOW OFTEN DO YOU GET TOGETHER WITH FRIENDS OR RELATIVES?: ONCE A WEEK

## 2024-03-21 NOTE — COMMUNITY RESOURCES LIST (ENGLISH)
March 21, 2024           YOUR PERSONALIZED LIST OF SERVICES & PROGRAMS           & SHELTER    Case Management      Living - Housing Stabilization Services  [address] [place] [address] (Distance: 7.1 miles)  Phone: [phone]  Website: https://www.Wellocities  Language: Mongolian, English, Congolese  Fee: Insurance, Self pay      [employer] - Housing Stabilization Services  Phone: [phone]  Website: https://homebasemn.com/  Language: English  Hours: Mon 8:00 AM - 4:00 PM Tue 8:00 AM - 4:00 PM Wed 8:00 AM - 4:00 PM Thu 8:00 AM - 4:00 PM Fri 8:00 AM - 4:00 PM  Fee: Free  Accessibility: Blind accommodation, Deaf or hard of hearing  Transportation Options: Free transportation      [facility] Housing Stabilization Services (HSS)  Phone: [phone]  Website: https://www.[employer].net/resources  Language: English, Arabic  Hours: Mon 8:00 AM - 4:00 PM Tue 8:00 AM - 4:00 PM Wed 8:00 AM - 4:00 PM Thu 8:00 AM - 4:00 PM Fri 8:00 AM - 4:00 PM  Fee: Free, Insurance  Accessibility: Ada accessible, Blind accommodation, Deaf or hard of hearing, Translation services    Payment Assistance      [facility] - Rent and mortgage payment assistance  [address] [place] [address] (Distance: 2.5 miles)  Phone: [phone]  Website: https://www.Risk Ident/  Language: English  Fee: Free      [employer] - Keep the Key  Phone: (857) 483-9777  Website: https://www.rxu53-njbeidcksswurw.org/programs.html  Language: English  Hours: Mon 7:00 AM - 7:00 PM Tue 7:00 AM - 7:00 PM Wed 7:00 AM - 7:00 PM Thu 7:00 AM - 7:00 PM Fri 7:00 AM - 7:00 PM  Fee: Free      [employer] - HOMEOWNER ASSISTANCE FUND (HAF) PROGRAM  Phone: (967) [phone]  Website: http://www.mayraetribe.org    Mediation & Eviction Prevention      [employer] - Foreclosure Prevention  [address] [place] [address] (Distance: 11.2 miles)  Phone: [phone]) [phone]   Website: http://www.nwhomepartners.org  Language: English, Maltese, Hmong  Fee: Free, Sliding scale  Accessibility: Blind accommodation, Ada accessible      Living - Housing Stabilization Services  [address] [place] [address] (Distance: 7.1 miles)  Phone: [phone]) [phone]  Website: https://ClickSquared  Language: Portuguese, English, Paraguayan  Fee: Insurance, Self pay      [facility] [employer] / Eviction Prevention  Website: https://homelinemn.org/y-mtzc-qq-/  Language: English, Maltese               IMPORTANT NUMBERS & WEBSITES        Emergency Services  911  .   United Way  211 http://211unitedway.org  .   Poison Control  (961) 919-4960 http://[place]poison.org http://wisconsinpoison.org  .     Suicide and Crisis Lifeline  988 http://988Gorbline.org  .   Childhelp National Child Abuse Hotline  460.348.6560 http://Childhelphotline.org   .   National Sexual Assault Hotline  (252) 998-4710 (HOPE) http://Evargrah Entertainment Groupn.org   .     National Runaway Safeline  (663) 292-5324 (RUNAWAY) http://2heuresavantruBiomoti.org  .   Pregnancy & Postpartum Support  Call/text 145-424-8572  [place]: http://ppsupportmn.org  [place]: http://Arista Power.com/wi  .   Substance Abuse National Helpline ([facility])  782-577-HELP (8210) http://Findtreatment.gov   .                DISCLAIMER: [employer] does not endorse any service providers mentioned in this resource list. [employer] does not guarantee that the services mentioned in this resource list will be available to you or will improve your health or wellness.    [facility]

## 2024-03-27 ENCOUNTER — OFFICE VISIT (OUTPATIENT)
Dept: FAMILY MEDICINE | Facility: CLINIC | Age: 24
End: 2024-03-27
Payer: COMMERCIAL

## 2024-03-27 ENCOUNTER — OFFICE VISIT (OUTPATIENT)
Dept: OBGYN | Facility: CLINIC | Age: 24
End: 2024-03-27
Payer: COMMERCIAL

## 2024-03-27 VITALS — HEART RATE: 80 BPM | SYSTOLIC BLOOD PRESSURE: 113 MMHG | OXYGEN SATURATION: 100 % | DIASTOLIC BLOOD PRESSURE: 71 MMHG

## 2024-03-27 VITALS
DIASTOLIC BLOOD PRESSURE: 71 MMHG | HEART RATE: 79 BPM | SYSTOLIC BLOOD PRESSURE: 111 MMHG | BODY MASS INDEX: 33.71 KG/M2 | HEIGHT: 62 IN | WEIGHT: 183.2 LBS | OXYGEN SATURATION: 99 % | TEMPERATURE: 98.8 F | RESPIRATION RATE: 14 BRPM

## 2024-03-27 DIAGNOSIS — Z00.00 ROUTINE GENERAL MEDICAL EXAMINATION AT A HEALTH CARE FACILITY: Primary | ICD-10-CM

## 2024-03-27 DIAGNOSIS — L64.9 ANDROGENIC ALOPECIA: ICD-10-CM

## 2024-03-27 DIAGNOSIS — F64.0 GENDER DYSPHORIA IN ADULT: ICD-10-CM

## 2024-03-27 DIAGNOSIS — Z90.13 S/P BILATERAL MASTECTOMY: ICD-10-CM

## 2024-03-27 DIAGNOSIS — N94.6 DYSMENORRHEA: Primary | ICD-10-CM

## 2024-03-27 DIAGNOSIS — Z30.011 ENCOUNTER FOR INITIAL PRESCRIPTION OF CONTRACEPTIVE PILLS: ICD-10-CM

## 2024-03-27 PROBLEM — Z79.890 HORMONE REPLACEMENT THERAPY: Status: RESOLVED | Noted: 2022-05-13 | Resolved: 2024-03-27

## 2024-03-27 PROCEDURE — 99213 OFFICE O/P EST LOW 20 MIN: CPT | Mod: 25

## 2024-03-27 PROCEDURE — 99213 OFFICE O/P EST LOW 20 MIN: CPT | Performed by: OBSTETRICS & GYNECOLOGY

## 2024-03-27 PROCEDURE — 99395 PREV VISIT EST AGE 18-39: CPT | Mod: GC

## 2024-03-27 RX ORDER — BUPROPION HYDROCHLORIDE 150 MG/1
TABLET ORAL
COMMUNITY
Start: 2024-03-14

## 2024-03-27 RX ORDER — BUPROPION HYDROCHLORIDE 150 MG/1
150 TABLET, EXTENDED RELEASE ORAL DAILY
COMMUNITY
End: 2024-03-27 | Stop reason: ALTCHOICE

## 2024-03-27 RX ORDER — FLUTICASONE PROPIONATE 50 MCG
SPRAY, SUSPENSION (ML) NASAL
COMMUNITY
Start: 2024-01-23

## 2024-03-27 NOTE — PATIENT INSTRUCTIONS
Preventive Care Advice   This is general advice given by our system to help you stay healthy. However, your care team may have specific advice just for you. Please talk to your care team about your preventive care needs.  Nutrition  Eat 5 or more servings of fruits and vegetables each day.  Try wheat bread, brown rice and whole grain pasta (instead of white bread, rice, and pasta).  Get enough calcium and vitamin D. Check the label on foods and aim for 100% of the RDA (recommended daily allowance).  Lifestyle  Exercise at least 150 minutes each week   (30 minutes a day, 5 days a week).  Do muscle strengthening activities 2 days a week. These help control your weight and prevent disease.  No smoking.  Wear sunscreen to prevent skin cancer.  Have a dental exam and cleaning every 6 months.  Yearly exams  See your health care team every year to talk about:  Any changes in your health.  Any medicines your care team has prescribed.  Preventive care, family planning, and ways to prevent chronic diseases.  Shots (vaccines)   HPV shots (up to age 26), if you've never had them before.  Hepatitis B shots (up to age 59), if you've never had them before.  COVID-19 shot: Get this shot when it's due.  Flu shot: Get a flu shot every year.  Tetanus shot: Get a tetanus shot every 10 years.  Pneumococcal, hepatitis A, and RSV shots: Ask your care team if you need these based on your risk.  Shingles shot (for age 50 and up).  General health tests  Diabetes screening:  Starting at age 35, Get screened for diabetes at least every 3 years.  If you are younger than age 35, ask your care team if you should be screened for diabetes.  Cholesterol test: At age 39, start having a cholesterol test every 5 years, or more often if advised.  Bone density scan (DEXA): At age 50, ask your care team if you should have this scan for osteoporosis (brittle bones).  Hepatitis C: Get tested at least once in your life.  STIs (sexually transmitted  infections)  Before age 24: Ask your care team if you should be screened for STIs.  After age 24: Get screened for STIs if you're at risk. You are at risk for STIs (including HIV) if:  You are sexually active with more than one person.  You don't use condoms every time.  You or a partner was diagnosed with a sexually transmitted infection.  If you are at risk for HIV, ask about PrEP medicine to prevent HIV.  Get tested for HIV at least once in your life, whether you are at risk for HIV or not.  Cancer screening tests  Cervical cancer screening: If you have a cervix, begin getting regular cervical cancer screening tests at age 21. Most people who have regular screenings with normal results can stop after age 65. Talk about this with your provider.  Breast cancer scan (mammogram): If you've ever had breasts, begin having regular mammograms starting at age 40. This is a scan to check for breast cancer.  Colon cancer screening: It is important to start screening for colon cancer at age 45.  Have a colonoscopy test every 10 years (or more often if you're at risk) Or, ask your provider about stool tests like a FIT test every year or Cologuard test every 3 years.  To learn more about your testing options, visit: https://www.Cupid-Labs/003236.pdf.  For help making a decision, visit: https://bit.ly/jl25817.  Prostate cancer screening test: If you have a prostate and are age 55 to 69, ask your provider if you would benefit from a yearly prostate cancer screening test.  Lung cancer screening: If you are a current or former smoker age 50 to 80, ask your care team if ongoing lung cancer screenings are right for you.  For informational purposes only. Not to replace the advice of your health care provider. Copyright   2023 [employer]. All rights reserved. Clinically reviewed by the [facility] Transitions Program. [employer] [id number] - REV 01/24.    Learning About Stress  What is stress?     Stress is your  body's response to a hard situation. Your body can have a physical, emotional, or mental response. Stress is a fact of life for most people, and it affects everyone differently. What causes stress for you may not be stressful for someone else.  A lot of things can cause stress. You may feel stress when you go on a job interview, take a test, or run a race. This kind of short-term stress is normal and even useful. It can help you if you need to work hard or react quickly. For example, stress can help you finish an important job on time.  Long-term stress is caused by ongoing stressful situations or events. Examples of long-term stress include long-term health problems, ongoing problems at work, or conflicts in your family. Long-term stress can harm your health.  How does stress affect your health?  When you are stressed, your body responds as though you are in danger. It makes hormones that speed up your heart, make you breathe faster, and give you a burst of energy. This is called the fight-or-flight stress response. If the stress is over quickly, your body goes back to normal and no harm is done.  But if stress happens too often or lasts too long, it can have bad effects. Long-term stress can make you more likely to get sick, and it can make symptoms of some diseases worse. If you tense up when you are stressed, you may develop neck, shoulder, or low back pain. Stress is linked to high blood pressure and heart disease.  Stress also harms your emotional health. It can make you king, tense, or depressed. Your relationships may suffer, and you may not do well at work or school.  What can you do to manage stress?  You can try these things to help manage stress:   Do something active. Exercise or activity can help reduce stress. Walking is a great way to get started. Even everyday activities such as housecleaning or yard work can help.  Try yoga or lyn chi. These techniques combine exercise and meditation. You may need  some training at first to learn them.  Do something you enjoy. For example, listen to music or go to a movie. Practice your hobby or do volunteer work.  Meditate. This can help you relax, because you are not worrying about what happened before or what may happen in the future.  Do guided imagery. Imagine yourself in any setting that helps you feel calm. You can use online videos, books, or a teacher to guide you.  Do breathing exercises. For example:  From a standing position, bend forward from the waist with your knees slightly bent. Let your arms dangle close to the floor.  Breathe in slowly and deeply as you return to a standing position. Roll up slowly and lift your head last.  Hold your breath for just a few seconds in the standing position.  Breathe out slowly and bend forward from the waist.  Let your feelings out. Talk, laugh, cry, and express anger when you need to. Talking with supportive friends or family, a counselor, or a sakshi leader about your feelings is a healthy way to relieve stress. Avoid discussing your feelings with people who make you feel worse.  Write. It may help to write about things that are bothering you. This helps you find out how much stress you feel and what is causing it. When you know this, you can find better ways to cope.  What can you do to prevent stress?  You might try some of these things to help prevent stress:  Manage your time. This helps you find time to do the things you want and need to do.  Get enough sleep. Your body recovers from the stresses of the day while you are sleeping.  Get support. Your family, friends, and community can make a difference in how you experience stress.  Limit your news feed. Avoid or limit time on social media or news that may make you feel stressed.  Do something active. Exercise or activity can help reduce stress. Walking is a great way to get started.  Where can you learn more?  Go to https://www.healthwise.net/patiented  Enter N032 in the  "search box to learn more about \"Learning About Stress.\"  Current as of: October 24, 2023               Content Version: 14.0    9469-7029 [employer].   Care instructions adapted under license by your healthcare professional. If you have questions about a medical condition or this instruction, always ask your healthcare professional. [employer] disclaims any warranty or liability for your use of this information.      "

## 2024-03-27 NOTE — PROGRESS NOTES
"GYN Clinic Note  Date: 3/27/2024  CC: hysterectomy    HPI:  Paxton Meredith \"he/him\" is a 23 year old adult  presents for evaluation of abnormal uterine bleeding as a referral from Talley, Karuna Crabtree.      On testosterone. Not sure if he'll be on it forever.   Before T, menses were regular but sometimes random  Heavy bleeding for 1-2days, then light. Last 7d  When younger he had to miss a day of school due to extreme pain during menses sometimes.   Last menses was  or Feb, only gets bleeding if misses testosterone  Last menses was heavy for a few days, then got lighter, stopped on day 5. Pain was bad the first 3d.  Menses exacerbate gender dysphoria.   Desires hysterectomy. Unsure about ovaries.     OBSTETRIC HISTORY:   OB History    Para Term  AB Living   0 0 0 0 0 0   SAB IAB Ectopic Multiple Live Births   0 0 0 0 0       GYN HISTORY:  No LMP recorded.  STI history: none  Last Pap: 23 NIL  The patient is sexually active with trans male partner. No chance of pregnancy    Past Medical History:   Diagnosis Date     Anxiety      Depressive disorder        Past Surgical History:   Procedure Laterality Date     TRANSGENDER MASTECTOMY Bilateral 2020    Procedure: Bilateral mastectomy with free nipple grafting;  Surgeon: Mono Altamirano MD;  Location:  OR       SOCIAL HISTORY:  Lives with partner.  Works as a teacher in childcare, toddler room  Tobacco: no  Alcohol: no (sober)  Drugs: no   History of abuse: no  Feels safe    Family History   Problem Relation Age of Onset     Arthritis Father      Leukemia Paternal Grandmother      Hypertension Paternal Grandfather      Skin Cancer Other      Melanoma Other      Diabetes Maternal Grandmother        Current Outpatient Medications   Medication Sig Dispense Refill     hydrOXYzine (ATARAX) 25 MG tablet Take 1 tablet (25 mg) by mouth 3 times daily as needed for anxiety 180 tablet 3     PFIZER COVID-19 VAC BIVALENT 30 MCG/0.3ML " injection        sertraline (ZOLOFT) 25 MG tablet Take one tablet (25mg) by mouth daily with 50mg tablet to total 75mg daily. 90 tablet 3     sertraline (ZOLOFT) 50 MG tablet Take one tablet (50mg) by mouth daily with 25mg tablet to total 75mg daily. 90 tablet 3     testosterone (ANDROGEL 1.62 % PUMP) 20.25 MG/ACT gel Place 3 Pump (60.75 mg) onto the skin daily Apply from dispenser to clean, dry, intact skin of the upper arms and shoulders. 150 g 12       Allergies: Patient has no known allergies.    ROS:  C: NEGATIVE for fever, chills, change in weight  I: NEGATIVE for worrisome rashes, moles or lesions  E: NEGATIVE for vision changes or irritation  E/M: NEGATIVE for ear, mouth and throat problems  R: NEGATIVE for significant cough or SOB  CV: NEGATIVE for chest pain, palpitations or peripheral edema  GI: NEGATIVE for nausea, abdominal pain, heartburn, or change in bowel habits  : NEGATIVE for frequency, dysuria, hematuria, vaginal discharge, or irregular bleeding  M: NEGATIVE for significant arthralgias or myalgia  N: NEGATIVE for weakness, dizziness or paresthesias  E: NEGATIVE for temperature intolerance, skin/hair changes  P: NEGATIVE for changes in mood or affect    EXAM:  Blood pressure 113/71, pulse 80, SpO2 100%, not currently breastfeeding.   BMI= There is no height or weight on file to calculate BMI.  General - pleasant, no acute distress.        ASSESSMENT:  Paxton Merdeith is a 23 year old  trans male who presents to discuss hysterectomy for dysmenorrhea and gender dysphoria.      Discussed laparoscopic hysterectomy bilateral salpingectomy +/- oophorectomy.  Recommend ultrasound prior to surgery to make sure ovaries look normal and confirm uterine size.  Recommend keeping ovaries if he is not planning on being on continuous testosterone. Discussed he needs exogenous testosterone or endogenous estrogen for things like bone health.   Plan vaginal estrogen for few weeks prior to surgery to  help with healing.  Discussed outpatient procedure but plan for 6week recovery.  Thinking about scheduling end of summer/early fall.     PLAN:  1. Dysmenorrhea    - US Transvaginal Pelvic Non-OB; Future  - Case Request: HYSTERECTOMY, TOTAL, LAPAROSCOPIC, WITH BILATERAL SALPINGECTOMY    2. Gender dysphoria in adult    - Case Request: HYSTERECTOMY, TOTAL, LAPAROSCOPIC, WITH BILATERAL SALPINGECTOMY      Alison Morales MD

## 2024-03-27 NOTE — PROGRESS NOTES
Preventive Care Visit  [facility] PRAFUL Talley MD, Family Medicine  Mar 27, 2024      Assessment & Plan     Routine general medical examination at a health care facility  Appropriate preventive services were discussed with this patient, including applicable screening as appropriate for fall prevention, nutrition, physical activity, Tobacco-use cessation, and cognition.  Checklist reviewing preventive services available has been given to the patient. Declined labs today    Androgenic alopecia  Gender dysphoria in adult  Has been on gender affirming testosterone HRT since 2016 and achieved all desired embodiment goals, most importantly deepening of voice. Would now like to stop testosterone due to androgenic alopecia. Counseled on how muscle mass may decrease and body fat may redistribute, and how if he would like to re-start testosterone we could use minoxidil and finasteride to minimize further hair loss. Also counseled that stopping testosterone will likely not make hair grow back. Understanding, and will proceed with cycle suppression with KALI as below and return if he would like to discuss restarting HRT.   - Discontinue tesosterone  - Follow up PRN    Encounter for initial prescription of contraceptive pills  For cycle suppression. Getting hysterectomy in August, not having oophorectomy.  - norethindrone-ethinyl estradiol (OVCON) 0.4-35 MG-MCG tablet  Dispense: 90 tablet; Refill: 3      Return in about 1 year (around 3/28/2025) for Preventive Visit.    Love Matta is a 23 year old, presenting for the following:  Physical (No concerns)        3/27/2024     2:48 PM   Additional Questions   Roomed by reinaldo   Accompanied by self         3/27/2024    Information    services provided? No        Health Care Directive  Patient does not have a Health Care Directive or Living Will: Discussed advance care planning with patient; however, patient declined at this  time.    HPI          3/21/2024   General Health   How would you rate your overall physical health? (!) FAIR   Feel stress (tense, anxious, or unable to sleep) To some extent   (!) STRESS CONCERN      3/21/2024   Nutrition   Three or more servings of calcium each day? Yes   Diet: Regular (no restrictions)   How many servings of fruit and vegetables per day? (!) 0-1   How many sweetened beverages each day? 0-1         3/21/2024   Exercise   Days per week of moderate/strenous exercise 2 days   Average minutes spent exercising at this level 20 min   (!) EXERCISE CONCERN      3/21/2024   Social Factors   Frequency of gathering with friends or relatives Once a week   Worry food won't last until get money to buy more No   Food not last or not have enough money for food? No   Do you have housing?  Yes   Are you worried about losing your housing? Yes   Lack of transportation? No   Unable to get utilities (heat,electricity)? No   Want help with housing or utility concern? (!) YES   (!) HOUSING CONCERN PRESENT  - just moved out of [facility] house  - current apartment is expensive. Hard to afford rent        3/21/2024   Dental   Dentist two times every year? Yes         3/21/2024   TB Screening   Were you born outside of the [place]? No     Today's PHQ-2 Score:       3/27/2024     1:26 PM   PHQ-2 ( 1999 [employer])   Q1: Little interest or pleasure in doing things 0   Q2: Feeling down, depressed or hopeless 0   PHQ-2 Score 0   Q1: Little interest or pleasure in doing things Not at all   Q2: Feeling down, depressed or hopeless Not at all   PHQ-2 Score 0         3/21/2024   Substance Use   Alcohol more than 3/day or more than 7/wk No   Do you use any other substances recreationally? (!) CANNABIS PRODUCTS  - once or twice per month     Social History     Tobacco Use    Smoking status: Never    Smokeless tobacco: Never   Vaping Use    Vaping Use: Never used   Substance Use Topics    Alcohol use: No    Drug use: No          Mammogram Screening -  "does not have chest tissue, s/p bilateral mastectomy for gender affirmation        3/21/2024   STI Screening   New sexual partner(s) since last STI/HIV test? (!) YES declined sti testing today     History of abnormal Pap smear: NO - age 21-29 PAP every 3 years recommended        11/20/2023     1:49 PM 9/11/2019     2:06 PM   PAP / HPV   PAP Negative for Intraepithelial Lesion or Malignancy (NILM)     PAP (Historical)  NIL            3/21/2024   Contraception/Family Planning   Questions about contraception or family planning (!) YES         Reviewed and updated as needed this visit by Provider                         Objective    Exam  /71 (BP Location: Left arm, Patient Position: Sitting, Cuff Size: Adult Large)   Pulse 79   Temp 98.8  F (37.1  C) (Oral)   Resp 14   Ht 1.575 m (5' 2\")   Wt 83.1 kg (183 lb 3.2 oz)   SpO2 99%   BMI 33.51 kg/m     Estimated body mass index is 33.51 kg/m  as calculated from the following:    Height as of this encounter: 1.575 m (5' 2\").    Weight as of this encounter: 83.1 kg (183 lb 3.2 oz).    Physical Exam  GENERAL: alert and no distress  NECK: no adenopathy, no asymmetry, masses, or scars  RESP: lungs clear to auscultation - no rales, rhonchi or wheezes  CV: regular rate and rhythm, normal S1 S2, no S3 or S4, no murmur, click or rub, no peripheral edema  ABDOMEN: soft, nontender, no hepatosplenomegaly, no masses and bowel sounds normal  MS: no gross musculoskeletal defects noted, no edema      Signed Electronically by: Karuna Talley MD    "

## 2024-03-27 NOTE — Clinical Note
Patient having a hard time making rent. Would you be able to call him and see if he qualifies for any subsidized housing?

## 2024-03-28 ENCOUNTER — TELEPHONE (OUTPATIENT)
Dept: FAMILY MEDICINE | Facility: CLINIC | Age: 24
End: 2024-03-28
Payer: COMMERCIAL

## 2024-03-28 ENCOUNTER — TELEPHONE (OUTPATIENT)
Dept: OBGYN | Facility: CLINIC | Age: 24
End: 2024-03-28
Payer: COMMERCIAL

## 2024-03-28 NOTE — TELEPHONE ENCOUNTER
called patient' per PCP request to discuss housing resources, as patient reports financial strain related to rent.     Voicemail was left asking for a call back when able.    TAD French

## 2024-04-02 ENCOUNTER — ANCILLARY PROCEDURE (OUTPATIENT)
Dept: ULTRASOUND IMAGING | Facility: CLINIC | Age: 24
End: 2024-04-02
Attending: OBSTETRICS & GYNECOLOGY
Payer: COMMERCIAL

## 2024-04-02 DIAGNOSIS — N94.6 DYSMENORRHEA: ICD-10-CM

## 2024-04-02 PROCEDURE — 76830 TRANSVAGINAL US NON-OB: CPT | Mod: KX | Performed by: RADIOLOGY

## 2024-04-02 PROCEDURE — 76856 US EXAM PELVIC COMPLETE: CPT | Mod: KX | Performed by: RADIOLOGY

## 2024-04-22 ENCOUNTER — HOSPITAL ENCOUNTER (EMERGENCY)
Facility: CLINIC | Age: 24
Discharge: HOME | End: 2024-04-23
Attending: EMERGENCY MEDICINE | Admitting: EMERGENCY MEDICINE
Payer: COMMERCIAL

## 2024-04-22 DIAGNOSIS — J02.9 PHARYNGITIS, UNSPECIFIED ETIOLOGY: ICD-10-CM

## 2024-04-22 DIAGNOSIS — R05.9 COUGH, UNSPECIFIED TYPE: ICD-10-CM

## 2024-04-22 LAB
FLUAV RNA SPEC QL NAA+PROBE: NEGATIVE
FLUBV RNA RESP QL NAA+PROBE: NEGATIVE
GROUP A STREP BY PCR: NOT DETECTED
RSV RNA SPEC NAA+PROBE: NEGATIVE
SARS-COV-2 RNA RESP QL NAA+PROBE: NEGATIVE

## 2024-04-22 PROCEDURE — 87651 STREP A DNA AMP PROBE: CPT | Performed by: EMERGENCY MEDICINE

## 2024-04-22 PROCEDURE — 87637 SARSCOV2&INF A&B&RSV AMP PRB: CPT | Performed by: EMERGENCY MEDICINE

## 2024-04-22 PROCEDURE — 99283 EMERGENCY DEPT VISIT LOW MDM: CPT

## 2024-04-22 ASSESSMENT — COLUMBIA-SUICIDE SEVERITY RATING SCALE - C-SSRS
6. HAVE YOU EVER DONE ANYTHING, STARTED TO DO ANYTHING, OR PREPARED TO DO ANYTHING TO END YOUR LIFE?: NO
1. IN THE PAST MONTH, HAVE YOU WISHED YOU WERE DEAD OR WISHED YOU COULD GO TO SLEEP AND NOT WAKE UP?: NO
2. HAVE YOU ACTUALLY HAD ANY THOUGHTS OF KILLING YOURSELF IN THE PAST MONTH?: NO

## 2024-04-22 ASSESSMENT — ACTIVITIES OF DAILY LIVING (ADL): ADLS_ACUITY_SCORE: 33

## 2024-04-23 VITALS
HEART RATE: 90 BPM | BODY MASS INDEX: 31.28 KG/M2 | SYSTOLIC BLOOD PRESSURE: 125 MMHG | WEIGHT: 170 LBS | DIASTOLIC BLOOD PRESSURE: 69 MMHG | OXYGEN SATURATION: 100 % | HEIGHT: 62 IN | TEMPERATURE: 97.1 F | RESPIRATION RATE: 16 BRPM

## 2024-04-23 PROBLEM — F90.9 ADHD (ATTENTION DEFICIT HYPERACTIVITY DISORDER): Status: ACTIVE | Noted: 2024-03-07

## 2024-04-23 PROCEDURE — 250N000009 HC RX 250: Performed by: EMERGENCY MEDICINE

## 2024-04-23 PROCEDURE — 250N000013 HC RX MED GY IP 250 OP 250 PS 637: Performed by: EMERGENCY MEDICINE

## 2024-04-23 RX ORDER — BENZONATATE 100 MG/1
100 CAPSULE ORAL 3 TIMES DAILY PRN
Qty: 30 CAPSULE | Refills: 0 | Status: SHIPPED | OUTPATIENT
Start: 2024-04-23 | End: 2024-05-03

## 2024-04-23 RX ORDER — DEXAMETHASONE SODIUM PHOSPHATE 10 MG/ML
10 INJECTION, SOLUTION INTRAMUSCULAR; INTRAVENOUS ONCE
Status: COMPLETED | OUTPATIENT
Start: 2024-04-23 | End: 2024-04-23

## 2024-04-23 RX ORDER — BENZONATATE 100 MG/1
100 CAPSULE ORAL 3 TIMES DAILY PRN
Status: DISCONTINUED | OUTPATIENT
Start: 2024-04-23 | End: 2024-04-23 | Stop reason: HOSPADM

## 2024-04-23 RX ADMIN — BENZONATATE 100 MG: 100 CAPSULE ORAL at 01:02

## 2024-04-23 RX ADMIN — DEXAMETHASONE SODIUM PHOSPHATE 10 MG: 10 INJECTION INTRAMUSCULAR; INTRAVENOUS at 01:02

## 2024-04-23 ASSESSMENT — ACTIVITIES OF DAILY LIVING (ADL): ADLS_ACUITY_SCORE: 35

## 2024-04-23 NOTE — DISCHARGE INSTRUCTIONS
You likely are experiencing symptoms from an upper respiratory infection, that is a virus.  You do not have strep throat any longer.  We are giving you a prescription for Tessalon, which can help with cough symptoms.  We gave you a dose of steroids here tonight, which can help with inflammation and swelling.  If your symptoms do not resolve by later this week, we recommend that you follow-up with your primary care provider.  Please return the emergency department if you develop any new or concerning symptoms.

## 2024-04-23 NOTE — ED PROVIDER NOTES
"    History     Chief Complaint:  URI       HPI   Paxton Meredith is a 23 year old adult presenting with sore throat and cough.  He reports he is taken to courses of antibiotics for strep throat.  Symptoms initially started with a sore throat 3 weeks ago.  He reports that those symptoms improved, but then the sore throat returned.  He is now experiencing a worsening cough.  No fever, chills, chest pain, shortness of breath, nausea, vomiting.  No difficulty swallowing, breathing or eating.      Independent Historian:    Patient only    Review of External Notes:  3/27/24: Clinic note reviewed      Medications:    benzonatate (TESSALON) 100 MG capsule  buPROPion (WELLBUTRIN XL) 150 MG 24 hr tablet  fluticasone (FLONASE) 50 MCG/ACT nasal spray  hydrOXYzine (ATARAX) 25 MG tablet  norethindrone-ethinyl estradiol (OVCON) 0.4-35 MG-MCG tablet  sertraline (ZOLOFT) 25 MG tablet  sertraline (ZOLOFT) 50 MG tablet        Past Medical History:    Past Medical History:   Diagnosis Date    Anxiety     Depressive disorder        Past Surgical History:    Past Surgical History:   Procedure Laterality Date    TRANSGENDER MASTECTOMY Bilateral 7/31/2020    Procedure: Bilateral mastectomy with free nipple grafting;  Surgeon: Mono Altamirano MD;  Location: [facility] OR          Physical Exam   Patient Vitals for the past 24 hrs:   BP Temp Temp src Pulse Resp SpO2 Height Weight   04/22/24 2147 125/69 97.1  F (36.2  C) Temporal 96 16 100 % 1.575 m (5' 2\") 77.1 kg (170 lb)        Physical Exam  General: No acute distress  Head: No obvious trauma to head.  Ears, Nose, Throat:  External ears normal.  Nose normal.  No pharyngeal erythema, swelling or exudate.  Midline uvula. Moist mucus membranes.  Eyes:  Conjunctivae clear.   Neck: Normal range of motion.  Neck supple.   CV: Regular rate and rhythm.  No murmurs.      Respiratory: Effort normal and breath sounds normal.  No wheezing or crackles.   Gastrointestinal: Soft.  No distension. There is " no tenderness.  There is no rigidity, no rebound and no guarding.   Musculoskeletal: Normal range of motion.  Non tender extremities to palpations. No lower extremity edema  Neuro: Alert. Moving all extremities appropriately.  Normal speech.    Skin: Skin is warm and dry.  No rash noted.   Psych: Normal mood and affect. Behavior is normal.       Emergency Department Course         Laboratory:  Labs Ordered and Resulted from Time of ED Arrival to Time of ED Departure   INFLUENZA A/B, RSV, & SARS-COV2 PCR - Normal       Result Value    Influenza A PCR Negative      Influenza B PCR Negative      RSV PCR Negative      SARS CoV2 PCR Negative     GROUP A STREPTOCOCCUS PCR THROAT SWAB - Normal    Group A strep by PCR Not Detected          Procedures   NA    Emergency Department Course & Assessments:    Interventions:  Medications   dexAMETHasone (PF) (DECADRON) injectable solution used ORALLY 10 mg (has no administration in time range)   benzonatate (TESSALON) capsule 100 mg (has no administration in time range)        Assessments:  0030 initial evaluation and assessment of patient    Independent Interpretation (X-rays, CTs, rhythm strip):  None    Consultations/Discussion of Management or Tests:  None       Social Determinants of Health affecting care:  None     Disposition:  The patient was discharged.    Impression & Plan    CMS Diagnoses: None       Medical Decision Making:  Patient presents with a sore throat and cough.  Group A strep is negative.  Influenza A, B, RSV and COVID are also negative.  Vital signs are stable.  The patient does not appear toxic, and is breathing comfortably on room air.  The patient is given a dose of Decadron and Tessalon.  He is given a prescription for Tessalon to be filled as needed.  Return precautions are given and he verbalizes understanding.  He is discharged home in stable condition.        Diagnosis:    ICD-10-CM    1. Pharyngitis, unspecified etiology  J02.9       2. Cough,  unspecified type  R05.9            Discharge Medications:  New Prescriptions    BENZONATATE (TESSALON) 100 MG CAPSULE    Take 1 capsule (100 mg) by mouth 3 times daily as needed for cough          4/23/2024   Apollo Acosta MD Peery, Stephen, MD  04/23/24 0123

## 2024-04-23 NOTE — ED TRIAGE NOTES
Pt on second antibiotic for strep, feels like tonsils are swollen and continues to have a cough.  Has had URI symptoms for 3 weeks.     Triage Assessment (Adult)       Row Name 04/22/24 7120          Triage Assessment    Airway WDL WDL        Respiratory WDL    Respiratory WDL cough        Cardiac WDL    Cardiac WDL WDL        Peripheral/Neurovascular WDL    Peripheral Neurovascular WDL WDL        Cognitive/Neuro/Behavioral WDL    Cognitive/Neuro/Behavioral WDL WDL

## 2024-04-25 ENCOUNTER — TELEPHONE (OUTPATIENT)
Dept: OBGYN | Facility: CLINIC | Age: 24
End: 2024-04-25
Payer: COMMERCIAL

## 2024-04-25 NOTE — TELEPHONE ENCOUNTER
[facility] Call Center    Phone Message    May a detailed message be left on voicemail: yes     Reason for Call: Other: Pt Is calling to request Prior Authorization for an upcoming Hysterectomy. Procedure is scheduled in August 2024. Please call pt with any questions or concerns.      Action Taken: Other: OBGYN    Travel Screening: Not Applicable

## 2024-04-27 ENCOUNTER — MYC REFILL (OUTPATIENT)
Dept: FAMILY MEDICINE | Facility: CLINIC | Age: 24
End: 2024-04-27
Payer: COMMERCIAL

## 2024-04-27 DIAGNOSIS — Z30.011 ENCOUNTER FOR INITIAL PRESCRIPTION OF CONTRACEPTIVE PILLS: ICD-10-CM

## 2024-04-29 NOTE — TELEPHONE ENCOUNTER
Kenia message sent to patient with CPT codes and explanation of Prior Auth initiation.     Nataliya  She/her/hers  [facility]

## 2024-05-22 NOTE — TELEPHONE ENCOUNTER
Type of surgery: gyn  Location of surgery: [facility]  Date and time of surgery: 08/23/2024 7:30AM  Surgeon: Dr. Alison Morales, Dr. Lola Wright assisting  Pre-Op Appt Date: 07/29/2024  Post-Op Appt Date: 1 and 6 weeks, waiting for Sept/Oct schedules   Packet sent out: Yes  Pre-cert/Authorization completed:  Not Applicable  Date: 05/22/2024

## 2024-05-25 DIAGNOSIS — F41.0 PANIC DISORDER: ICD-10-CM

## 2024-05-25 DIAGNOSIS — F41.9 ANXIETY: ICD-10-CM

## 2024-05-28 NOTE — TELEPHONE ENCOUNTER
"Request for medication refill:  sertraline (ZOLOFT) 50 MG tablet     Providers if patient needs an appointment and you are willing to give a one month supply please refill for one month and  send a letter/MyChart using \".SMILLIMITEDREFILL\" .smillimited and route chart to \"[facility] \" (Giving one month refill in non controlled medications is strongly recommended before denial)    If refill has been denied, meaning absolutely no refills without visit, please complete the smart phrase \".smirxrefuse\" and route it to the \"[facility] MED REFILLS\"  pool to inform the patient and the pharmacy.    Addis Henriquez, [facility]      "

## 2024-07-09 NOTE — TELEPHONE ENCOUNTER
MOVED TO DEPOT    Patient requesting to move to Feb 2025. Will call patient once schedules are available

## 2024-10-07 NOTE — TELEPHONE ENCOUNTER
Patient called, LVM. Wanting to schedule in January vs February.     LVM for patient to inform that January schedules aren't available yet but I've made a note that they're now wanting January and that we'll call them when schedules become available.       Nataliya Hairston/her/hers  [facility]       Received Choice form at [phone]  Agency/Facility Name: [facility]  Referral sent per Choice form @ [phone]

## 2024-10-28 ENCOUNTER — TELEPHONE (OUTPATIENT)
Dept: OBGYN | Facility: CLINIC | Age: 24
End: 2024-10-28
Payer: COMMERCIAL

## 2024-10-28 DIAGNOSIS — N95.2 VAGINAL ATROPHY: Primary | ICD-10-CM

## 2024-10-28 NOTE — TELEPHONE ENCOUNTER
Called patient to schedule TLH /w BS for DOS 01/15/25. Patient wondering if they need to redo any US imaging prior to DOS? Anything else he needs done/redone prior to DOS?     Routing to  for consideration.       Nataliya Hairston/her/hers  [facility]

## 2024-10-29 RX ORDER — ESTRADIOL 0.1 MG/G
2 CREAM VAGINAL
Qty: 42.5 G | Refills: 1 | Status: CANCELLED | OUTPATIENT
Start: 2024-10-30

## 2024-10-29 NOTE — TELEPHONE ENCOUNTER
No need to repeat ultrasound but would be good to do a phone or video visit with me sometime before surgery to go over what to expect on day of, recovery, etc.  I do recommend he use vaginal estrogen for 1-2 months prior to surgery to help with healing, I can send a prescription - please ask which pharmacy he prefers.  Thanks  Alison Morales MD

## 2024-10-29 NOTE — TELEPHONE ENCOUNTER
LVMTCB to update and asked for patient to call back to discuss.     Confirm if this is his preferred pharmacy? [id number] - [place], [place] - [address] [facility]  She/her/hers  [facility]

## 2024-10-30 NOTE — TELEPHONE ENCOUNTER
Patient scheduled for consult on 11/13 (aware of jury duty). Pharmacy is [facility] in [place].    Pt would like someone to call and go over how to use vaginal estrogen if possible. Thanks!

## 2024-11-07 ENCOUNTER — TELEPHONE (OUTPATIENT)
Dept: OBGYN | Facility: CLINIC | Age: 24
End: 2024-11-07
Payer: COMMERCIAL

## 2024-11-07 NOTE — TELEPHONE ENCOUNTER
Pt has in notes that he would want an afternoon time if one became available. Today, the afternoon schedule opened up. LVM 11/7 SH

## 2024-11-13 ENCOUNTER — OFFICE VISIT (OUTPATIENT)
Dept: OBGYN | Facility: CLINIC | Age: 24
End: 2024-11-13
Payer: COMMERCIAL

## 2024-11-13 VITALS
SYSTOLIC BLOOD PRESSURE: 127 MMHG | DIASTOLIC BLOOD PRESSURE: 78 MMHG | TEMPERATURE: 98.1 F | WEIGHT: 173.1 LBS | BODY MASS INDEX: 31.86 KG/M2 | HEIGHT: 62 IN | HEART RATE: 98 BPM | OXYGEN SATURATION: 100 %

## 2024-11-13 DIAGNOSIS — K59.00 CONSTIPATION, UNSPECIFIED CONSTIPATION TYPE: ICD-10-CM

## 2024-11-13 DIAGNOSIS — F64.0 GENDER DYSPHORIA IN ADULT: Primary | ICD-10-CM

## 2024-11-13 DIAGNOSIS — N94.6 DYSMENORRHEA: ICD-10-CM

## 2024-11-13 DIAGNOSIS — N95.2 VAGINAL ATROPHY: ICD-10-CM

## 2024-11-13 DIAGNOSIS — N95.2 VAGINAL ATROPHY: Primary | ICD-10-CM

## 2024-11-13 RX ORDER — ESTRADIOL 10 UG/1
10 INSERT VAGINAL DAILY
Qty: 30 TABLET | Refills: 0 | Status: SHIPPED | OUTPATIENT
Start: 2024-11-13 | End: 2024-11-14

## 2024-11-13 RX ORDER — SENNA AND DOCUSATE SODIUM 50; 8.6 MG/1; MG/1
1 TABLET, FILM COATED ORAL
Qty: 30 TABLET | Refills: 0 | Status: SHIPPED | OUTPATIENT
Start: 2024-11-13

## 2024-11-13 NOTE — PATIENT INSTRUCTIONS
[facility]  Professional Building  [address]  [place] [address]    Phone: [phone]  Fax: [phone]

## 2024-11-13 NOTE — PROGRESS NOTES
"GYN Clinic Note  Date: 2024  CC: hysterectomy discussion      HPI:  Paxton Meredith is a 24 year old adult  presents for evaluation of abnormal uterine bleeding as a referral from Karuna Talley. Her last menses was ***.     She describes *** bleeding on ***.     Her work-up thus far for her abnormal bleeding has included:  Hgb: ***  Endometrial biopsy: ***  Pap smear: ***  Thyroid function: ***  Urine pregnancy test: ***  Is the patient sexually active: ***   Ultrasound: ***    Treatment thus far has included:   Hormonal treatment: {Yes (Details)/No:025962398}***  Mirena IUD: {Yes (Details)/No:[id number]}***  Ablation: {Yes (Details)/No:[id number]}  ***     Patient is still experiencing *** after all these treatments and her current symptoms are unsatisfactory.     Patient's last menstrual period was 10/01/2024 (approximate).  [email or web address]  ***     Patient would additionally like to address the following today:   ***    OBSTETRIC HISTORY:   OB History    Para Term  AB Living   0 0 0 0 0 0   SAB IAB Ectopic Multiple Live Births   0 0 0 0 0     Obstetric history significant for:   ***  ***  Patient denies history of *** gestational diabetes, *** gestational hypertension, ***  delivery, ***     GYN HISTORY:  Menarche: ***  Menses: occur every *** days lasting *** - *** days in duration.   Patient's last menstrual period was 10/01/2024 (approximate).  STI history: ***{STD AGENTS:838262::\"No STD history\"}  Last Pap: ***  History of abnormal pap: *** {pap history:726}  History of cervical procedures: ***   Contraceptive History: ***  The patient is sexually active with fe***male partners.   She has the following concerns about sexual function: ***   She reports she is un***satisfied in her relationship.     Past Medical History:   Diagnosis Date    Anxiety     Depressive disorder        Past Surgical History:   Procedure Laterality Date    TRANSGENDER MASTECTOMY Bilateral 2020    " "Procedure: Bilateral mastectomy with free nipple grafting;  Surgeon: Mono Altamirano MD;  Location:  OR       SOCIAL HISTORY:  Lives ***.  Works as ***.  Tobacco: ***  Alcohol: ***  Drugs: ***  History of abuse: ***    Family History   Problem Relation Age of Onset    Arthritis Father     Diabetes Maternal Grandmother     Leukemia Paternal Grandmother     Hypertension Paternal Grandfather     Skin Cancer Other     Melanoma Other     Ovarian Cancer Paternal Aunt      There is *** family history of uterine, ovarian, breast, colon cancer.     Current Outpatient Medications   Medication Sig Dispense Refill    buPROPion (WELLBUTRIN XL) 150 MG 24 hr tablet       hydrOXYzine (ATARAX) 25 MG tablet Take 1 tablet (25 mg) by mouth 3 times daily as needed for anxiety 180 tablet 3    sertraline (ZOLOFT) 25 MG tablet Take one tablet (25mg) by mouth daily with 50mg tablet to total 75mg daily. 90 tablet 3       Allergies: Patient has no known allergies.    ROS:  C: NEGATIVE for fever, chills, change in weight  I: NEGATIVE for worrisome rashes, moles or lesions  E: NEGATIVE for vision changes or irritation  E/M: NEGATIVE for ear, mouth and throat problems  R: NEGATIVE for significant cough or SOB  CV: NEGATIVE for chest pain, palpitations or peripheral edema  GI: NEGATIVE for nausea, abdominal pain, heartburn, or change in bowel habits  : NEGATIVE for frequency, dysuria, hematuria, vaginal discharge, or irregular bleeding  M: NEGATIVE for significant arthralgias or myalgia  N: NEGATIVE for weakness, dizziness or paresthesias  E: NEGATIVE for temperature intolerance, skin/hair changes  P: NEGATIVE for changes in mood or affect    EXAM:  Blood pressure 127/78, pulse 98, temperature 98.1  F (36.7  C), height 1.575 m (5' 2\"), weight 78.5 kg (173 lb 1.6 oz), last menstrual period 10/01/2024, SpO2 100%, not currently breastfeeding.   BMI= Body mass index is 31.66 kg/m .  General - pleasant female in no acute distress.  Abdomen - soft, " nontender, nondistended, no hepatosplenomegaly.  Pelvic - external genitalia: normal adult female without lesions or abnormalities; BUS: within normal limits; Vagina: well rugated, no discharge, no lesions; Cervix: no lesions or CMT; Uterus: ***verted, *** week sized, mobile and nontender; Adnexae: no masses or tenderness.  Rectovaginal - deferred.  Musculoskeletal - no gross deformities.  Neurological - normal strength, sensation, and mental status.      ASSESSMENT:  Paxton Meredith is a 24 year old  who presents with abnormal uterine bleeding secondary to ***.     PLAN:  There are no diagnoses linked to this encounter.    Discussed postmenopausal bleeding. ***   Endometrial biopsy was *** today.  Cervical cancer screening:     Alison Morales MD

## 2024-11-14 ENCOUNTER — TELEPHONE (OUTPATIENT)
Dept: OBGYN | Facility: CLINIC | Age: 24
End: 2024-11-14
Payer: COMMERCIAL

## 2024-11-14 ENCOUNTER — MYC MEDICAL ADVICE (OUTPATIENT)
Dept: OBGYN | Facility: CLINIC | Age: 24
End: 2024-11-14
Payer: COMMERCIAL

## 2024-11-14 DIAGNOSIS — N95.2 VAGINAL ATROPHY: ICD-10-CM

## 2024-11-14 RX ORDER — ESTRADIOL 10 UG/1
10 INSERT VAGINAL
Qty: 24 TABLET | Refills: 0 | Status: SHIPPED | OUTPATIENT
Start: 2024-11-14

## 2024-11-14 NOTE — TELEPHONE ENCOUNTER
estradiol (VAGIFEM) 10 MCG TABS vaginal tablet  10 mcg, DAILY           Summary: Place 1 tablet (10 mcg) vaginally shiv          Pharmacy wanting to verify pt is going to use daily ( I assume vs twice weekly?)  If going to do daily will need PA  Let RN know if you want to keep this daily and will initiate PA    Routing to provider to advise.  Bere Richter RN on 11/14/2024 at 11:48 AM

## 2024-11-14 NOTE — TELEPHONE ENCOUNTER
Misti sent to pt to give an FYI that script at pharmacy might look different than MD instructions and reviewed to still do daily vagifem plan 1 month before surgery.   Bere Richter RN on 11/14/2024 at 3:12 PM

## 2024-11-14 NOTE — TELEPHONE ENCOUNTER
I adjusted the rx for twice weekly and gave 3 mo supply so he will have enough to use it daily or every other day for month prior to surgery.  Alison Morales MD

## 2024-11-14 NOTE — TELEPHONE ENCOUNTER
I was going to have him do it daily for the 1month prior to surgery but 2-3 times per week would be fine.  Alison Morales MD

## 2024-11-23 ENCOUNTER — MYC MEDICAL ADVICE (OUTPATIENT)
Dept: FAMILY MEDICINE | Facility: CLINIC | Age: 24
End: 2024-11-23
Payer: COMMERCIAL

## 2024-11-23 DIAGNOSIS — F41.9 ANXIETY: ICD-10-CM

## 2024-11-23 DIAGNOSIS — F41.0 PANIC DISORDER: ICD-10-CM

## 2024-12-05 ENCOUNTER — TELEPHONE (OUTPATIENT)
Dept: OBGYN | Facility: CLINIC | Age: 24
End: 2024-12-05
Payer: COMMERCIAL

## 2024-12-05 NOTE — TELEPHONE ENCOUNTER
----- Message from Nataliya PADILLA sent at 10/28/2024 10:59 AM CDT -----  Regardin & 6 wk post op  01/15/25 7:30AM gyn SL, CLOVIS assisting  ERAS      Call patient to schedule 1 & 6 wk Post op appts w/ BALTAZAR once February schedules are posted

## 2024-12-05 NOTE — TELEPHONE ENCOUNTER
LVMTCB to help patient schedule 6 wk post op with Andrew, KEN 01/15/25 (1 wk post op already scheduled).     Nataliya Hairston/her/hers  [facility]

## 2024-12-10 ENCOUNTER — TELEPHONE (OUTPATIENT)
Dept: OBGYN | Facility: CLINIC | Age: 24
End: 2024-12-10
Payer: COMMERCIAL

## 2024-12-19 ASSESSMENT — PATIENT HEALTH QUESTIONNAIRE - PHQ9
SUM OF ALL RESPONSES TO PHQ QUESTIONS 1-9: 11
SUM OF ALL RESPONSES TO PHQ QUESTIONS 1-9: 11
10. IF YOU CHECKED OFF ANY PROBLEMS, HOW DIFFICULT HAVE THESE PROBLEMS MADE IT FOR YOU TO DO YOUR WORK, TAKE CARE OF THINGS AT HOME, OR GET ALONG WITH OTHER PEOPLE: VERY DIFFICULT

## 2024-12-20 ENCOUNTER — OFFICE VISIT (OUTPATIENT)
Dept: FAMILY MEDICINE | Facility: CLINIC | Age: 24
End: 2024-12-20
Payer: COMMERCIAL

## 2024-12-20 VITALS
DIASTOLIC BLOOD PRESSURE: 75 MMHG | SYSTOLIC BLOOD PRESSURE: 112 MMHG | WEIGHT: 175.3 LBS | BODY MASS INDEX: 32.26 KG/M2 | RESPIRATION RATE: 14 BRPM | HEART RATE: 97 BPM | HEIGHT: 62 IN | TEMPERATURE: 98 F | OXYGEN SATURATION: 99 %

## 2024-12-20 DIAGNOSIS — Z01.818 PREOP GENERAL PHYSICAL EXAM: Primary | ICD-10-CM

## 2024-12-20 DIAGNOSIS — F64.0 GENDER DYSPHORIA IN ADULT: ICD-10-CM

## 2024-12-20 DIAGNOSIS — R06.83 SNORING: ICD-10-CM

## 2024-12-20 NOTE — PROGRESS NOTES
Preoperative Evaluation  [address]  [address]  [facility] [address]  Phone: [phone]  Fax: [phone]  Primary Provider: Karuna Talley MD  Pre-op Performing Provider: Karuna Talley MD  Dec 20, 2024             12/16/2024   Surgical Information   What procedure is being done? Hysterectomy   Facility or Hospital where procedure/surgery will be performed: [facility]   Who is doing the procedure / surgery? Dr. Alison Bingham   Date of surgery / procedure: 01/15/2025   Time of surgery / procedure: 7am   Where do you plan to recover after surgery? at home with family     Fax number for surgical facility: Note does not need to be faxed, will be available electronically in Epic.    Assessment & Plan     The proposed surgical procedure is considered INTERMEDIATE risk.    Preop general physical exam      Snoring  Noted on preop questionnaire STOP BANG 3.   - Adult Sleep Eval & Management Referral; Future      Depression Screening Follow Up        12/19/2024     2:39 PM   PHQ   PHQ-9 Total Score 11    Q9: Thoughts of better off dead/self-harm past 2 weeks Not at all       Patient-reported     Deferred       Follow Up Actions Taken  Referred patient back to current mental health provider.     Possible Sleep Apnea:        12/20/2024     2:27 PM   STOP-Bang Total Score   Total Score 3   Risk Stratification 3 - 4: Moderate Risk for WILMER           - No identified additional risk factors other than previously addressed    Preoperative Medication Instructions  Antiplatelet or Anticoagulation Medication Instructions   - Patient is on no antiplatelet or anticoagulation medications.    Additional Medication Instructions   - Herbal medications and vitamins: DO NOT TAKE 14 days prior to surgery.   - SSRIs, SNRIs, TCAs, Antipsychotics: Continue without modification.     Recommendation  Approval given to proceed with proposed procedure, without further diagnostic evaluation.    Subjective    Paxton is a 24 year old, presenting for the following:  Pre-Op Exam          12/20/2024     1:53 PM   Additional Questions   Roomed by Nathaniel         12/20/2024    Information    services provided? No     HPI related to upcoming procedure: gender dysphoria and menstrual pain        12/16/2024   Pre-Op Questionnaire   Have you ever had a heart attack or stroke? No   Have you ever had surgery on your heart or blood vessels, such as a stent placement, a coronary artery bypass, or surgery on an artery in your head, neck, heart, or legs? No   Do you have chest pain with activity? No   Do you have a history of heart failure? No   Do you currently have a cold, bronchitis or symptoms of other infection? No   Do you have a cough, shortness of breath, or wheezing? No   Do you or anyone in your family have previous history of blood clots? (!) YES dad w blood clots in legs and hands. Dad's dad too.    Do you or does anyone in your family have a serious bleeding problem such as prolonged bleeding following surgeries or cuts? No   Have you ever had problems with anemia or been told to take iron pills? No   Have you had any abnormal blood loss such as black, tarry or bloody stools? No   Have you had any abnormal blood loss such as black, tarry or bloody stools, or abnormal vaginal bleeding? No   Have you ever had a blood transfusion? No   Are you willing to have a blood transfusion if it is medically needed before, during, or after your surgery? Yes   Have you or any of your relatives ever had problems with anesthesia? No   Do you have sleep apnea, excessive snoring or daytime drowsiness? (!) YES - snores, wakes up unrefreshed. Asked his fiance if he has heard him stop breathing and he said no, but he does snore loud.    Do you have a CPAP machine? (!) NO    Do you have any artifical heart valves or other implanted medical devices like a pacemaker, defibrillator, or continuous glucose monitor? No   Do you have  "artificial joints? No   Are you allergic to latex? No     Health Care Directive  Patient does not have a Health Care Directive: Discussed advance care planning with patient; however, patient declined at this time.    Preoperative Review of    reviewed - controlled substances reflected in medication list.          Patient Active Problem List    Diagnosis Date Noted    ADHD (attention deficit hyperactivity disorder) 03/07/2024     Priority: Medium    S/P bilateral mastectomy 01/27/2023     Priority: Medium    Gender dysphoria in adult 11/26/2019     Priority: Medium     Added automatically from request for surgery [id number]      Panic disorder 08/13/2019     Priority: Medium    Anxiety 10/19/2016     Priority: Medium      Past Medical History:   Diagnosis Date    Anxiety     Depressive disorder      Past Surgical History:   Procedure Laterality Date    TRANSGENDER MASTECTOMY Bilateral 7/31/2020    Procedure: Bilateral mastectomy with free nipple grafting;  Surgeon: Mono Altamirano MD;  Location:  OR     Current Outpatient Medications   Medication Sig Dispense Refill    buPROPion (WELLBUTRIN XL) 150 MG 24 hr tablet       estradiol (VAGIFEM) 10 MCG TABS vaginal tablet Place 1 tablet (10 mcg) vaginally twice a week. 24 tablet 0    hydrOXYzine (ATARAX) 25 MG tablet Take 1 tablet (25 mg) by mouth 3 times daily as needed for anxiety 180 tablet 3    SENNA-docusate sodium (SENNA S) 8.6-50 MG tablet Take 1 tablet by mouth nightly as needed (constipation). 30 tablet 0    sertraline (ZOLOFT) 50 MG tablet 3 tablets (150 mg) daily.         No Known Allergies     Social History     Tobacco Use    Smoking status: Never    Smokeless tobacco: Never   Substance Use Topics    Alcohol use: No       History   Drug Use No               Objective    /75   Pulse 97   Temp 98  F (36.7  C) (Temporal)   Resp 14   Ht 1.575 m (5' 2\")   Wt 79.5 kg (175 lb 4.8 oz)   LMP 12/18/2024 (Approximate)   SpO2 99%   BMI 32.06 kg/m   " "  Estimated body mass index is 32.06 kg/m  as calculated from the following:    Height as of this encounter: 1.575 m (5' 2\").    Weight as of this encounter: 79.5 kg (175 lb 4.8 oz).  Physical Exam  GENERAL: alert and no distress  HENT: ear canals and TM's normal, nose and mouth without ulcers or lesions  NECK: no adenopathy, no asymmetry, masses, or scars  RESP: lungs clear to auscultation - no rales, rhonchi or wheezes  CV: regular rate and rhythm, normal S1 S2, no S3 or S4, no murmur, click or rub, no peripheral edema  ABDOMEN: soft, nontender, no hepatosplenomegaly, no masses and bowel sounds normal  MS: no gross musculoskeletal defects noted, no edema        Diagnostics  No labs were ordered during this visit.   No EKG required, no history of coronary heart disease, significant arrhythmia, peripheral arterial disease or other structural heart disease.    Revised Cardiac Risk Index (RCRI)  The patient has the following serious cardiovascular risks for perioperative complications:   - No serious cardiac risks = 0 points     RCRI Interpretation: 0 points: Class I (very low risk - 0.4% complication rate)         Signed Electronically by: Karuna Talley MD  A copy of this evaluation report is provided to the requesting physician.        Answers submitted by the patient for this visit:  Patient Health Questionnaire (Submitted on 12/19/2024)  If you checked off any problems, how difficult have these problems made it for you to do your work, take care of things at home, or get along with other people?: Very difficult  PHQ9 TOTAL SCORE: 11    "

## 2024-12-20 NOTE — PATIENT INSTRUCTIONS
How to Take Your Medication Before Surgery  Preoperative Medication Instructions   Antiplatelet or Anticoagulation Medication Instructions   - Patient is on no antiplatelet or anticoagulation medications.    Additional Medication Instructions   - Herbal medications and vitamins: DO NOT TAKE 14 days prior to surgery.   - SSRIs, SNRIs, TCAs, Antipsychotics: Continue without modification.        Patient Education   Preparing for Your Surgery  For Adults  Getting started  In most cases, a nurse will call to review your health history and instructions. They will give you an arrival time based on your scheduled surgery time. Please be ready to share:  Your doctor's clinic name and phone number  Your medical, surgical, and anesthesia history  A list of allergies and sensitivities  A list of medicines, including herbal treatments and over-the-counter drugs  Whether the patient has a legal guardian (ask how to send us the papers in advance)  Note: You may not receive a call if you were seen at our PAC (Preoperative Assessment Center).  Please tell us if you're pregnant--or if there's any chance you might be pregnant. Some surgeries may injure a fetus (unborn baby), so they require a pregnancy test. Surgeries that are safe for a fetus don't always need a test, and you can choose whether to have one.   Preparing for surgery  Within 10 to 30 days of surgery: Have a pre-op exam (sometimes called an H&P, or History and Physical). This can be done at a clinic or pre-operative center.  If you're having a , you may not need this exam. Talk to your care team.  At your pre-op exam, talk to your care team about all medicines you take. (This includes CBD oil and any drugs, such as THC, marijuana, and other forms of cannabis.) If you need to stop any medicine before surgery, ask when to start taking it again.  This is for your safety. Many medicines and drugs can make you bleed too much during surgery. Some change how well  surgery (anesthesia) drugs work.  Call your insurance company to let them know you're having surgery. (If you don't have insurance, call [phone].)  Call your clinic if there's any change in your health. This includes a scrape or scratch near the surgery site, or any signs of a cold (sore throat, runny nose, cough, rash, fever).  Eating and drinking guidelines  For your safety: Unless your surgeon tells you otherwise, follow the guidelines below.  Eat and drink as normal until 8 hours before you arrive for surgery. After that, no food or milk. You can spit out gum when you arrive.  Drink clear liquids until 2 hours before you arrive. These are liquids you can see through, like water, Gatorade, and Propel Water. They also include plain black coffee and tea (no cream or milk).  No alcohol for 24 hours before you arrive. The night before surgery, stop any drinks that contain THC.  If your care team tells you to take medicine on the morning of surgery, it's okay to take it with a sip of water. No other medicines or drugs are allowed (including CBD oil)--follow your care team's instructions.  If you have questions the day of surgery, call your hospital or surgery center.   Preventing infection  Shower or bathe the night before and the morning of surgery. Follow the instructions your clinic gave you. (If no instructions, use regular soap.)  Don't shave or clip hair near your surgery site. We'll remove the hair if needed.  Don't smoke or vape the morning of surgery. No chewing tobacco for 6 hours before you arrive. A nicotine patch is okay. You may spit out nicotine gum when you arrive.  For some surgeries, the surgeon will tell you to fully quit smoking and nicotine.  We will make every effort to keep you safe from infection. We will:  Clean our hands often with soap and water (or an alcohol-based hand rub).  Clean the skin at your surgery site with a special soap that kills germs.  Give you a special gown to keep you  warm. (Cold raises the risk of infection.)  Wear hair covers, masks, gowns, and gloves during surgery.  Give antibiotic medicine, if prescribed. Not all surgeries need this medicine.  What to bring on the day of surgery  Photo ID and insurance card  Copy of your health care directive, if you have one  Glasses and hearing aids (bring cases)  You can't wear contacts during surgery  Inhaler and eye drops, if you use them (tell us about these when you arrive)  CPAP machine or breathing device, if you use them  A few personal items, if spending the night  If you have . . .  A pacemaker, ICD (cardiac defibrillator), or other implant: Bring the ID card.  An implanted stimulator: Bring the remote control.  A legal guardian: Bring a copy of the certified (court-stamped) guardianship papers.  Please remove any jewelry, including body piercings. Leave jewelry and other valuables at home.  If you're going home the day of surgery  You must have a responsible adult drive you home. They should stay with you overnight as well.  If you don't have someone to stay with you, and you aren't safe to go home alone, we may keep you overnight. Insurance often won't pay for this.  After surgery  If it's hard to control your pain or you need more pain medicine, please call your surgeon's office.  Questions?   If you have any questions for your care team, list them here:   ____________________________________________________________________________________________________________________________________________________________________________________________________________________________________________________________  For informational purposes only. Not to replace the advice of your health care provider. Copyright   2003, 2019 [facility]. All rights reserved. Clinically reviewed by Sha Cardoso MD. [employer] [id number] - REV 08/24.

## 2024-12-20 NOTE — PROGRESS NOTES
{PROVIDER CHARTING PREFERENCE:180154}    Subjective   Paxton is a 24 year old, presenting for the following health issues:  No chief complaint on file.  {(!) Visit Details have not yet been documented.  Please enter Visit Details and then use this list to pull in documentation. (Optional):940178}  HPI     {MA/LPN/RN Pre-Provider Visit Orders- hCG/UA/Strep (Optional):322238}  {SUPERLIST (Optional):079042}  {additonal problems for provider to add (Optional):805722}    {ROS Picklists (Optional):777597}      Objective    LMP 10/01/2024 (Approximate)   There is no height or weight on file to calculate BMI.  Physical Exam   {Exam List (Optional):729445}    {Diagnostic Test Results (Optional):019205}        Signed Electronically by: Karuna Talley MD  {Email feedback regarding this note to [email or web address]   :155533}  Answers submitted by the patient for this visit:  Patient Health Questionnaire (Submitted on 12/19/2024)  If you checked off any problems, how difficult have these problems made it for you to do your work, take care of things at home, or get along with other people?: Very difficult  PHQ9 TOTAL SCORE: 11

## 2024-12-20 NOTE — PROGRESS NOTES
Preoperative Evaluation  [address] [address]  Phone: [phone]  Fax: [phone]  Primary Provider: Karuna Talely MD  Pre-op Performing Provider: Karuna Talley MD  Dec 20, 2024   {Provider  Link to PREOP SmartSet  REQUIRED to apply standard patient instructions and medication directions to the AVS :809559}  {ROOMER review and update patient entered surgical information if needed :121625}        12/16/2024   Surgical Information   What procedure is being done? Hysterectomy   Facility or Hospital where procedure/surgery will be performed: [facility]   Who is doing the procedure / surgery? Dr. Alison Bingham   Date of surgery / procedure: 01/15/2025   Time of surgery / procedure: 7am   Where do you plan to recover after surgery? at home with family     Fax number for surgical facility: {:[phone]}    {Provider Charting Preference for Preop :719400}    Love Matta is a 24 year old, presenting for the following:  Pre-Op Exam          12/20/2024     1:53 PM   Additional Questions   Roomed by Nathaniel         12/20/2024    Information    services provided? No     HPI related to upcoming procedure: ***        12/16/2024   Pre-Op Questionnaire   Have you ever had a heart attack or stroke? No   Have you ever had surgery on your heart or blood vessels, such as a stent placement, a coronary artery bypass, or surgery on an artery in your head, neck, heart, or legs? No   Do you have chest pain with activity? No   Do you have a history of heart failure? No   Do you currently have a cold, bronchitis or symptoms of other infection? No   Do you have a cough, shortness of breath, or wheezing? No   Do you or anyone in your family have previous history of blood clots? (!) YES ***   Do you or does anyone in your family have a serious bleeding problem such as prolonged bleeding following surgeries or cuts? No   Have you ever had problems with  anemia or been told to take iron pills? No   Have you had any abnormal blood loss such as black, tarry or bloody stools? No   Have you had any abnormal blood loss such as black, tarry or bloody stools, or abnormal vaginal bleeding? No   Have you ever had a blood transfusion? No   Are you willing to have a blood transfusion if it is medically needed before, during, or after your surgery? Yes   Have you or any of your relatives ever had problems with anesthesia? No   Do you have sleep apnea, excessive snoring or daytime drowsiness? (!) YES   Do you have a CPAP machine? (!) NO ***   Do you have any artifical heart valves or other implanted medical devices like a pacemaker, defibrillator, or continuous glucose monitor? No   Do you have artificial joints? No   Are you allergic to latex? No     Health Care Directive  Patient does not have a Health Care Directive: {ADVANCE_DIRECTIVE_STATUS:550561}    Preoperative Review of   {Mnpmpreport:457836}  {Review MNPMP for all patients per [facility] MNPMP Profile:621152}    {Chronic problem details (Optional) :072863}    Patient Active Problem List    Diagnosis Date Noted    ADHD (attention deficit hyperactivity disorder) 03/07/2024     Priority: Medium    S/P bilateral mastectomy 01/27/2023     Priority: Medium    Gender dysphoria in adult 11/26/2019     Priority: Medium     Added automatically from request for surgery [id number]      Panic disorder 08/13/2019     Priority: Medium    Anxiety 10/19/2016     Priority: Medium      Past Medical History:   Diagnosis Date    Anxiety     Depressive disorder      Past Surgical History:   Procedure Laterality Date    TRANSGENDER MASTECTOMY Bilateral 7/31/2020    Procedure: Bilateral mastectomy with free nipple grafting;  Surgeon: Mono Altamirano MD;  Location: [facility] OR     Current Outpatient Medications   Medication Sig Dispense Refill    buPROPion (WELLBUTRIN XL) 150 MG 24 hr tablet       estradiol (VAGIFEM) 10 MCG TABS vaginal tablet Place 1  "tablet (10 mcg) vaginally twice a week. 24 tablet 0    hydrOXYzine (ATARAX) 25 MG tablet Take 1 tablet (25 mg) by mouth 3 times daily as needed for anxiety 180 tablet 3    SENNA-docusate sodium (SENNA S) 8.6-50 MG tablet Take 1 tablet by mouth nightly as needed (constipation). 30 tablet 0    sertraline (ZOLOFT) 50 MG tablet 3 tablets (150 mg) daily.         No Known Allergies     Social History     Tobacco Use    Smoking status: Never    Smokeless tobacco: Never   Substance Use Topics    Alcohol use: No     {FAMILY HISTORY (Optional):297814351}  History   Drug Use No           {ROS Picklists (Optional):755017}    Objective    /75   Pulse 97   Temp 98  F (36.7  C) (Temporal)   Resp 14   Ht 1.575 m (5' 2\")   Wt 79.5 kg (175 lb 4.8 oz)   LMP 2024 (Approximate)   SpO2 99%   BMI 32.06 kg/m     Estimated body mass index is 32.06 kg/m  as calculated from the following:    Height as of this encounter: 1.575 m (5' 2\").    Weight as of this encounter: 79.5 kg (175 lb 4.8 oz).  Physical Exam  {Exam List :274345}    No results for input(s): \"HGB\", \"PLT\", \"INR\", \"NA\", \"POTASSIUM\", \"CR\", \"A1C\" in the last 8760 hours.     Diagnostics  {LABS:384602}   {EK}    Revised Cardiac Risk Index (RCRI)  The patient has the following serious cardiovascular risks for perioperative complications:  {PREOP REVISED CARDIAC RISK INDEX (RCRI) :636026}     RCRI Interpretation: {REVISED CARDIAC RISK INTERPRETATION :028575}         Signed Electronically by: Karuna Talley MD  A copy of this evaluation report is provided to the requesting physician.    {Provider Resources  [facility]  Revised Cardiac Risk Index :213735}   {Email feedback regarding this note to [email or web address]   :862559}  Answers submitted by the patient for this visit:  Patient Health Questionnaire (Submitted on 2024)  If you checked off any problems, how difficult have these " problems made it for you to do your work, take care of things at home, or get along with other people?: Very difficult  PHQ9 TOTAL SCORE: 11

## 2024-12-31 ENCOUNTER — OFFICE VISIT (OUTPATIENT)
Dept: URGENT CARE | Facility: URGENT CARE | Age: 24
End: 2024-12-31
Payer: COMMERCIAL

## 2024-12-31 VITALS
TEMPERATURE: 97 F | BODY MASS INDEX: 31.64 KG/M2 | SYSTOLIC BLOOD PRESSURE: 116 MMHG | HEART RATE: 97 BPM | DIASTOLIC BLOOD PRESSURE: 80 MMHG | OXYGEN SATURATION: 99 % | WEIGHT: 173 LBS

## 2024-12-31 DIAGNOSIS — J02.0 STREP THROAT: ICD-10-CM

## 2024-12-31 DIAGNOSIS — B37.31 YEAST VAGINITIS: Primary | ICD-10-CM

## 2024-12-31 DIAGNOSIS — R07.0 THROAT PAIN: ICD-10-CM

## 2024-12-31 DIAGNOSIS — R05.1 ACUTE COUGH: ICD-10-CM

## 2024-12-31 LAB
ALBUMIN UR-MCNC: NEGATIVE MG/DL
APPEARANCE UR: CLEAR
BILIRUB UR QL STRIP: NEGATIVE
CLUE CELLS: ABNORMAL
COLOR UR AUTO: YELLOW
DEPRECATED S PYO AG THROAT QL EIA: NEGATIVE
GLUCOSE UR STRIP-MCNC: NEGATIVE MG/DL
HGB UR QL STRIP: NEGATIVE
KETONES UR STRIP-MCNC: ABNORMAL MG/DL
LEUKOCYTE ESTERASE UR QL STRIP: NEGATIVE
NITRATE UR QL: NEGATIVE
PH UR STRIP: 6.5 [PH] (ref 5–7)
S PYO DNA THROAT QL NAA+PROBE: DETECTED
SP GR UR STRIP: 1.02 (ref 1–1.03)
TRICHOMONAS, WET PREP: ABNORMAL
UROBILINOGEN UR STRIP-ACNC: 0.2 E.U./DL
WBC'S/HIGH POWER FIELD, WET PREP: ABNORMAL
YEAST, WET PREP: PRESENT

## 2024-12-31 RX ORDER — FLUTICASONE PROPIONATE 50 MCG
1 SPRAY, SUSPENSION (ML) NASAL DAILY
Qty: 16 G | Refills: 0 | Status: SHIPPED | OUTPATIENT
Start: 2024-12-31

## 2024-12-31 RX ORDER — FLUCONAZOLE 150 MG/1
150 TABLET ORAL ONCE
Qty: 1 TABLET | Refills: 0 | Status: SHIPPED | OUTPATIENT
Start: 2024-12-31 | End: 2024-12-31

## 2024-12-31 ASSESSMENT — ENCOUNTER SYMPTOMS
SINUS PRESSURE: 1
SORE THROAT: 1
FEVER: 0
COUGH: 1
RHINORRHEA: 1

## 2024-12-31 NOTE — PROGRESS NOTES
Assessment & Plan:        ICD-10-CM    1. Yeast vaginitis  B37.31 UA Macroscopic with reflex to Microscopic and Culture     Wet preparation     fluconazole (DIFLUCAN) 150 MG tablet      2. Throat pain  R07.0 Streptococcus A Rapid Screen w/Reflex to PCR - Clinic Collect     Group A Streptococcus PCR Throat Swab      3. Acute cough  R05.1 fluticasone (FLONASE) 50 MCG/ACT nasal spray      4. Strep throat  J02.0 penicillin V (VEETID) 500 MG tablet            Plan/Clinical Decision Making:    Patient presents with 2 concerns today:    Abnormal vaginal discharge and has had intermittent itching.  Wet prep showed positive yeast.  Prescription for tablet of Diflucan sent in. UA negative.     Has also had nasal congestion and cough for 2 weeks.  Started with cold symptoms now with lingering cough and recently developed sore throat.  Strep was negative.  Due to ongoing symptoms recommend start Flonase every day.  At this time low suspicion of a bacterial sinusitis.    PCR positive, penicillin sent in.     Return if symptoms worsen or fail to improve, for in 3-5 days.     At the end of the encounter, I discussed results, diagnosis, medications. Discussed red flags for immediate return to clinic/ER, as well as indications for follow up if no improvement. Patient understood and agreed to plan. Patient was stable for discharge.        Cherelle Montenegro PA-C on 12/31/2024 at 1:31 PM          Subjective:     HPI:    Paxton is a 24 year old adult who presents to clinic today for the following health issues:  Chief Complaint   Patient presents with    Urgent Care    Cough     Cough x2week, throat hurts    Vaginal Problem     Vaginal discharge     HPI    1) Cough for two weeks. Off and on ST.   Having nasal congestion for two weeks.   Yellow and clear nasal drainage.       2) Vaginal discharge  Has had more discharge. Usually rarely has discharge.   Has slight odor.     Review of Systems   Constitutional:  Negative for fever.   HENT:   Positive for congestion, rhinorrhea (mild), sinus pressure and sore throat.    Respiratory:  Positive for cough.    Genitourinary:  Positive for vaginal discharge.         Patient Active Problem List   Diagnosis    Anxiety    Panic disorder    Gender dysphoria in adult    S/P bilateral mastectomy    ADHD (attention deficit hyperactivity disorder)        Past Medical History:   Diagnosis Date    Anxiety     Depressive disorder        Social History     Tobacco Use    Smoking status: Never    Smokeless tobacco: Never   Substance Use Topics    Alcohol use: No             Objective:     Vitals:    12/31/24 1300   BP: 116/80   Pulse: 97   Temp: 97  F (36.1  C)   TempSrc: Tympanic   SpO2: 99%   Weight: 78.5 kg (173 lb)         Physical Exam   EXAM:   Pleasant, alert, appropriate appearance. NAD.  Head Exam: Normocephalic, atraumatic.  Eye Exam:  non icteric/injection.    Ear Exam: TMs grey without bulging. Normal canals.  Normal pinna.  Nose Exam: Normal external nose.    OroPharynx Exam:  Moist mucous membranes. mild erythema, pharynx without exudate or hypertrophy.  Neck/Thyroid Exam:  No LAD.    Chest/Respiratory Exam: CTAB.  Cardiovascular Exam: RRR. No murmur or rubs.        Results:  Results for orders placed or performed in visit on 12/31/24   UA Macroscopic with reflex to Microscopic and Culture     Status: Abnormal    Specimen: Urine, Clean Catch   Result Value Ref Range    Color Urine Yellow Colorless, Straw, Light Yellow, Yellow    Appearance Urine Clear Clear    Glucose Urine Negative Negative mg/dL    Bilirubin Urine Negative Negative    Ketones Urine Trace (A) Negative mg/dL    Specific Gravity Urine 1.020 1.003 - 1.035    Blood Urine Negative Negative    pH Urine 6.5 5.0 - 7.0    Protein Albumin Urine Negative Negative mg/dL    Urobilinogen Urine 0.2 0.2, 1.0 E.U./dL    Nitrite Urine Negative Negative    Leukocyte Esterase Urine Negative Negative    Narrative    Microscopic not indicated   Wet preparation      Status: Abnormal    Specimen: Vagina; Swab   Result Value Ref Range    Trichomonas Absent Absent    Yeast Present (A) Absent    Clue Cells Absent Absent    WBCs/high power field 1+ (A) None   Streptococcus A Rapid Screen w/Reflex to PCR - Clinic Collect     Status: Normal    Specimen: Throat; Swab   Result Value Ref Range    Group A Strep antigen Negative Negative   Group A Streptococcus PCR Throat Swab     Status: Abnormal    Specimen: Throat; Swab   Result Value Ref Range    Group A strep by PCR Detected (A) Not Detected    Narrative    The Xpert Xpress Strep A test, performed on the [employer], is a rapid, qualitative in vitro diagnostic test for the detection of Streptococcus pyogenes (Group A ß-hemolytic Streptococcus, Strep A) in throat swab specimens from patients with signs and symptoms of pharyngitis. The Xpert Xpress Strep A test can be used as an aid in the diagnosis of Group A Streptococcal pharyngitis. The assay is not intended to monitor treatment for Group A Streptococcus infections. The Xpert Xpress Strep A test utilizes an automated real-time polymerase chain reaction (PCR) to detect Streptococcus pyogenes DNA.

## 2025-01-01 RX ORDER — PENICILLIN V POTASSIUM 500 MG/1
500 TABLET, FILM COATED ORAL 2 TIMES DAILY
Qty: 20 TABLET | Refills: 0 | Status: SHIPPED | OUTPATIENT
Start: 2025-01-01 | End: 2025-01-11

## 2025-01-06 ENCOUNTER — DOCUMENTATION ONLY (OUTPATIENT)
Dept: LAB | Facility: CLINIC | Age: 25
End: 2025-01-06

## 2025-01-06 NOTE — PROGRESS NOTES
Patient has an upcomming lab visit without orders. Please place orders as needed.    Patient requesting: CBC and ABO    Appointment date: 1/13/2024

## 2025-01-08 DIAGNOSIS — Z01.818 PRE-OP EXAM: Primary | ICD-10-CM

## 2025-01-08 DIAGNOSIS — F64.0 GENDER DYSPHORIA IN ADULT: ICD-10-CM

## 2025-01-12 LAB
ABO + RH BLD: NORMAL
BLD GP AB SCN SERPL QL: NEGATIVE
SPECIMEN EXP DATE BLD: NORMAL

## 2025-01-13 ENCOUNTER — LAB (OUTPATIENT)
Dept: LAB | Facility: CLINIC | Age: 25
End: 2025-01-13
Payer: COMMERCIAL

## 2025-01-13 DIAGNOSIS — F64.0 GENDER DYSPHORIA IN ADULT: ICD-10-CM

## 2025-01-13 DIAGNOSIS — Z01.818 PRE-OP EXAM: ICD-10-CM

## 2025-01-13 LAB
ERYTHROCYTE [DISTWIDTH] IN BLOOD BY AUTOMATED COUNT: 12 % (ref 10–15)
HCT VFR BLD AUTO: 43.3 % (ref 35–53)
HGB BLD-MCNC: 14.1 G/DL (ref 11.7–17.7)
MCH RBC QN AUTO: 30.1 PG (ref 26.5–33)
MCHC RBC AUTO-ENTMCNC: 32.6 G/DL (ref 31.5–36.5)
MCV RBC AUTO: 93 FL (ref 78–100)
PLATELET # BLD AUTO: 261 10E3/UL (ref 150–450)
RBC # BLD AUTO: 4.68 10E6/UL (ref 3.8–5.9)
WBC # BLD AUTO: 6.3 10E3/UL (ref 4–11)

## 2025-01-13 PROCEDURE — 86850 RBC ANTIBODY SCREEN: CPT

## 2025-01-13 PROCEDURE — 86900 BLOOD TYPING SEROLOGIC ABO: CPT

## 2025-01-13 PROCEDURE — 85027 COMPLETE CBC AUTOMATED: CPT

## 2025-01-13 PROCEDURE — 86901 BLOOD TYPING SEROLOGIC RH(D): CPT

## 2025-01-13 PROCEDURE — 36415 COLL VENOUS BLD VENIPUNCTURE: CPT

## 2025-01-14 ENCOUNTER — ANESTHESIA EVENT (OUTPATIENT)
Dept: SURGERY | Facility: CLINIC | Age: 25
End: 2025-01-14
Payer: COMMERCIAL

## 2025-01-14 ENCOUNTER — TELEPHONE (OUTPATIENT)
Dept: OBGYN | Facility: CLINIC | Age: 25
End: 2025-01-14
Payer: COMMERCIAL

## 2025-01-14 NOTE — ANESTHESIA PREPROCEDURE EVALUATION
"Anesthesia Pre-Procedure Evaluation    Patient: Paxton Meredith   MRN: [id number] : 2000        Procedure : Procedure(s):  HYSTERECTOMY, TOTAL, LAPAROSCOPIC, WITH BILATERAL SALPINGECTOMY          Past Medical History:   Diagnosis Date     Anxiety      Depressive disorder       Past Surgical History:   Procedure Laterality Date     TRANSGENDER MASTECTOMY Bilateral 2020    Procedure: Bilateral mastectomy with free nipple grafting;  Surgeon: Mono Altamirano MD;  Location: [facility] OR      No Known Allergies   Social History     Tobacco Use     Smoking status: Never     Smokeless tobacco: Never   Substance Use Topics     Alcohol use: No      Wt Readings from Last 1 Encounters:   24 78.5 kg (173 lb)        Anesthesia Evaluation   Pt has had prior anesthetic.         ROS/MED HX  ENT/Pulmonary:       Neurologic:       Cardiovascular:       METS/Exercise Tolerance:     Hematologic:       Musculoskeletal:       GI/Hepatic:       Renal/Genitourinary:       Endo:       Psychiatric/Substance Use:     (+) psychiatric history anxiety and depression (ADHD.  Panic Disorder.)       Infectious Disease:       Malignancy:       Other: Comment: Gender Dysphoria.  S/P Bilateral Mastectomy 2023.           Physical Exam    Airway        Mallampati: II   TM distance: > 3 FB   Neck ROM: full   Mouth opening: > 3 cm    Respiratory Devices and Support         Dental       (+) Completely normal teeth      Cardiovascular   cardiovascular exam normal          Pulmonary   pulmonary exam normal            OUTSIDE LABS:  CBC:   Lab Results   Component Value Date    WBC 6.3 2025    WBC 6.5 2023    HGB 14.1 2025    HGB 14.9 2023    HCT 43.3 2025    HCT 45.0 2023     2025     2023     BMP:   Lab Results   Component Value Date    POTASSIUM 3.6 2020    CR 1.07 2020    GLC 89 07/15/2023     (H) 2023     COAGS: No results found for: \"PTT\", \"INR\", " "\"FIBR\"  POC:   Lab Results   Component Value Date    HCGS Negative 07/31/2020     HEPATIC:   Lab Results   Component Value Date    ALBUMIN 4.0 04/16/2022    PROTTOTAL 7.8 04/16/2022    ALT 52 04/16/2022    AST 28 04/16/2022    ALKPHOS 61 04/16/2022    BILITOTAL 0.4 04/16/2022     OTHER:   Lab Results   Component Value Date    A1C 5.6 04/08/2023       Anesthesia Plan    ASA Status:  2    NPO Status:  NPO Appropriate    Anesthesia Type: General.     - Airway: ETT   Induction: Propofol.   Maintenance: Balanced.        Consents    Anesthesia Plan(s) and associated risks, benefits, and realistic alternatives discussed. Questions answered and patient/representative(s) expressed understanding.     - Discussed:     - Discussed with:  Patient, Spouse      - Extended Intubation/Ventilatory Support Discussed: No.      - Patient is DNR/DNI Status: No     Use of blood products discussed: No .     Postoperative Care    Pain management: Oral pain medications, IV analgesics.   PONV prophylaxis: Ondansetron (or other 5HT-3), Dexamethasone or Solumedrol, Background Propofol Infusion, Aprepitant, Scopolamine patch     Comments:               Louise Leong MD    I have reviewed the pertinent notes and labs in the chart from the past 30 days and (re)examined the patient.  Any updates or changes from those notes are reflected in this note.                         # Obesity: Estimated body mass index is 31.64 kg/m  as calculated from the following:    Height as of 12/20/24: 1.575 m (5' 2\").    Weight as of 12/31/24: 78.5 kg (173 lb).             "

## 2025-01-14 NOTE — TELEPHONE ENCOUNTER
PAPERWORK REQUEST    Form received on: 01/13/25   How was form received: Misti (CRM Request)   Preferred Provider: Alison Morales MD  Type of form: FMLA  Date needed by: as soon as possible  What to do with form once completed: Please fax to [phone]  Where was form placed?: in 's basket for signature  Has an SILVIA been signed? Not Applicable    Additional Notes:      Nataliya Hairston/her/hers  [facility]

## 2025-01-15 ENCOUNTER — ANESTHESIA (OUTPATIENT)
Dept: SURGERY | Facility: CLINIC | Age: 25
End: 2025-01-15
Payer: COMMERCIAL

## 2025-01-15 PROCEDURE — 250N000011 HC RX IP 250 OP 636

## 2025-01-15 PROCEDURE — 88305 TISSUE EXAM BY PATHOLOGIST: CPT | Mod: 26 | Performed by: STUDENT IN AN ORGANIZED HEALTH CARE EDUCATION/TRAINING PROGRAM

## 2025-01-15 PROCEDURE — 250N000011 HC RX IP 250 OP 636: Mod: JZ | Performed by: OBSTETRICS & GYNECOLOGY

## 2025-01-15 PROCEDURE — 250N000009 HC RX 250

## 2025-01-15 PROCEDURE — 258N000003 HC RX IP 258 OP 636

## 2025-01-15 PROCEDURE — 88305 TISSUE EXAM BY PATHOLOGIST: CPT | Mod: TC | Performed by: OBSTETRICS & GYNECOLOGY

## 2025-01-15 PROCEDURE — 81025 URINE PREGNANCY TEST: CPT | Performed by: OBSTETRICS & GYNECOLOGY

## 2025-01-15 RX ORDER — DEXAMETHASONE SODIUM PHOSPHATE 4 MG/ML
INJECTION, SOLUTION INTRA-ARTICULAR; INTRALESIONAL; INTRAMUSCULAR; INTRAVENOUS; SOFT TISSUE PRN
Status: DISCONTINUED | OUTPATIENT
Start: 2025-01-15 | End: 2025-01-15

## 2025-01-15 RX ORDER — KETOROLAC TROMETHAMINE 30 MG/ML
INJECTION, SOLUTION INTRAMUSCULAR; INTRAVENOUS PRN
Status: DISCONTINUED | OUTPATIENT
Start: 2025-01-15 | End: 2025-01-15

## 2025-01-15 RX ORDER — SODIUM CHLORIDE, SODIUM LACTATE, POTASSIUM CHLORIDE, CALCIUM CHLORIDE 600; 310; 30; 20 MG/100ML; MG/100ML; MG/100ML; MG/100ML
INJECTION, SOLUTION INTRAVENOUS CONTINUOUS PRN
Status: DISCONTINUED | OUTPATIENT
Start: 2025-01-15 | End: 2025-01-15

## 2025-01-15 RX ORDER — FENTANYL CITRATE 50 UG/ML
INJECTION, SOLUTION INTRAMUSCULAR; INTRAVENOUS PRN
Status: DISCONTINUED | OUTPATIENT
Start: 2025-01-15 | End: 2025-01-15

## 2025-01-15 RX ORDER — ONDANSETRON 2 MG/ML
INJECTION INTRAMUSCULAR; INTRAVENOUS PRN
Status: DISCONTINUED | OUTPATIENT
Start: 2025-01-15 | End: 2025-01-15

## 2025-01-15 RX ORDER — LIDOCAINE HYDROCHLORIDE 20 MG/ML
INJECTION, SOLUTION INFILTRATION; PERINEURAL PRN
Status: DISCONTINUED | OUTPATIENT
Start: 2025-01-15 | End: 2025-01-15

## 2025-01-15 RX ORDER — PROPOFOL 10 MG/ML
INJECTION, EMULSION INTRAVENOUS PRN
Status: DISCONTINUED | OUTPATIENT
Start: 2025-01-15 | End: 2025-01-15

## 2025-01-15 RX ADMIN — HYDROMORPHONE HYDROCHLORIDE 0.2 MG: 1 INJECTION, SOLUTION INTRAMUSCULAR; INTRAVENOUS; SUBCUTANEOUS at 09:22

## 2025-01-15 RX ADMIN — FENTANYL CITRATE 50 MCG: 50 INJECTION INTRAMUSCULAR; INTRAVENOUS at 07:54

## 2025-01-15 RX ADMIN — PHENYLEPHRINE HYDROCHLORIDE 100 MCG: 10 INJECTION INTRAVENOUS at 09:07

## 2025-01-15 RX ADMIN — PHENYLEPHRINE HYDROCHLORIDE 100 MCG: 10 INJECTION INTRAVENOUS at 07:59

## 2025-01-15 RX ADMIN — Medication 50 MG: at 07:43

## 2025-01-15 RX ADMIN — PHENYLEPHRINE HYDROCHLORIDE 100 MCG: 10 INJECTION INTRAVENOUS at 09:33

## 2025-01-15 RX ADMIN — SODIUM CHLORIDE, POTASSIUM CHLORIDE, SODIUM LACTATE AND CALCIUM CHLORIDE: 600; 310; 30; 20 INJECTION, SOLUTION INTRAVENOUS at 07:34

## 2025-01-15 RX ADMIN — Medication 20 MG: at 08:27

## 2025-01-15 RX ADMIN — SUGAMMADEX 100 MG: 100 INJECTION, SOLUTION INTRAVENOUS at 09:45

## 2025-01-15 RX ADMIN — KETOROLAC TROMETHAMINE 15 MG: 30 INJECTION, SOLUTION INTRAMUSCULAR at 09:24

## 2025-01-15 RX ADMIN — DEXMEDETOMIDINE HYDROCHLORIDE 8 MCG: 100 INJECTION, SOLUTION INTRAVENOUS at 07:58

## 2025-01-15 RX ADMIN — MIDAZOLAM 2 MG: 1 INJECTION INTRAMUSCULAR; INTRAVENOUS at 07:34

## 2025-01-15 RX ADMIN — PHENYLEPHRINE HYDROCHLORIDE 100 MCG: 10 INJECTION INTRAVENOUS at 09:42

## 2025-01-15 RX ADMIN — DEXAMETHASONE SODIUM PHOSPHATE 8 MG: 4 INJECTION, SOLUTION INTRAMUSCULAR; INTRAVENOUS at 07:55

## 2025-01-15 RX ADMIN — ONDANSETRON 4 MG: 2 INJECTION INTRAMUSCULAR; INTRAVENOUS at 09:26

## 2025-01-15 RX ADMIN — FENTANYL CITRATE 50 MCG: 50 INJECTION INTRAMUSCULAR; INTRAVENOUS at 08:05

## 2025-01-15 RX ADMIN — PROPOFOL 150 MG: 10 INJECTION, EMULSION INTRAVENOUS at 07:43

## 2025-01-15 RX ADMIN — Medication 2 G: at 07:49

## 2025-01-15 RX ADMIN — Medication 10 MG: at 09:18

## 2025-01-15 RX ADMIN — LIDOCAINE HYDROCHLORIDE 80 MG: 20 INJECTION, SOLUTION INFILTRATION; PERINEURAL at 07:43

## 2025-01-15 RX ADMIN — PROPOFOL 50 MCG/KG/MIN: 10 INJECTION, EMULSION INTRAVENOUS at 07:47

## 2025-01-15 RX ADMIN — METRONIDAZOLE 500 MG: 500 INJECTION, SOLUTION INTRAVENOUS at 07:48

## 2025-01-15 RX ADMIN — DEXMEDETOMIDINE HYDROCHLORIDE 8 MCG: 100 INJECTION, SOLUTION INTRAVENOUS at 09:18

## 2025-01-15 RX ADMIN — DEXMEDETOMIDINE HYDROCHLORIDE 4 MCG: 100 INJECTION, SOLUTION INTRAVENOUS at 08:11

## 2025-01-15 NOTE — ANESTHESIA PROCEDURE NOTES
Airway         Procedure Start/Stop Times: 1/15/2025 7:45 AM  Staff -        Other Anesthesia Staff: Waylon Naylor       Performed By: SRNAIndications and Patient Condition       Indications for airway management: moisés-procedural       Induction type:intravenous       Mask difficulty assessment: 1 - vent by mask    Final Airway Details       Final airway type: endotracheal airway       Successful airway: ETT - single  Endotracheal Airway Details        ETT size (mm): 7.0       Cuffed: yes       Cuff volume (mL): 10       Successful intubation technique: direct laryngoscopy       DL Blade Type: MAC 3       Grade View of Cords: 1       Adjucts: stylet       Position: Right       Measured from: lips       Secured at (cm): 21       Bite block used: None    Post intubation assessment        Placement verified by: capnometry, equal breath sounds and chest rise        Number of attempts at approach: 1       Number of other approaches attempted: 0       Secured with: tape       Ease of procedure: easy       Dentition: Intact and Unchanged    Medication(s) Administered   Medication Administration Time: 1/15/2025 7:45 AM

## 2025-01-15 NOTE — ANESTHESIA POSTPROCEDURE EVALUATION
Patient: Paxton Meredith    Procedure: Procedure(s):  HYSTERECTOMY, TOTAL, LAPAROSCOPIC, WITH BILATERAL SALPINGECTOMY, cystoscopy       Anesthesia Type:  General    Note:  Disposition: Outpatient   Postop Pain Control: Uneventful            Sign Out: Well controlled pain   PONV: No   Neuro/Psych: Uneventful            Sign Out: Acceptable/Baseline neuro status   Airway/Respiratory: Uneventful            Sign Out: Acceptable/Baseline resp. status   CV/Hemodynamics: Uneventful            Sign Out: Acceptable CV status; No obvious hypovolemia; No obvious fluid overload   Other NRE: NONE   DID A NON-ROUTINE EVENT OCCUR? No    Event details/Postop Comments:  Sleepy but responsive and appropriate.  VSS.  Tolerating po.  Anticipate discharge to Phase 1 soon.    Dr. Louise Leong MD  Anesthesiology       Last vitals:  Vitals Value Taken Time   /59 01/15/25 1200   Temp 36.4  C (97.5  F) 01/15/25 1130   Pulse 83 01/15/25 1123   Resp 12 01/15/25 1130   SpO2 95 % 01/15/25 1210   Vitals shown include unfiled device data.    Electronically Signed By: Louise Leong MD  January 15, 2025  12:10 PM

## 2025-01-29 ENCOUNTER — OFFICE VISIT (OUTPATIENT)
Dept: OBGYN | Facility: CLINIC | Age: 25
End: 2025-01-29
Payer: COMMERCIAL

## 2025-01-29 VITALS
HEART RATE: 107 BPM | OXYGEN SATURATION: 98 % | SYSTOLIC BLOOD PRESSURE: 111 MMHG | DIASTOLIC BLOOD PRESSURE: 75 MMHG | TEMPERATURE: 97.3 F | HEIGHT: 62 IN | WEIGHT: 173 LBS | BODY MASS INDEX: 31.83 KG/M2

## 2025-01-29 DIAGNOSIS — Z90.710 STATUS POST LAPAROSCOPIC HYSTERECTOMY: Primary | ICD-10-CM

## 2025-01-29 PROCEDURE — 99024 POSTOP FOLLOW-UP VISIT: CPT | Performed by: OBSTETRICS & GYNECOLOGY

## 2025-01-29 NOTE — PROGRESS NOTES
"GYN CLINIC VISIT  2025  CC: post op appt    Paxton Meredith is a 24 year old  who is 2 weeks post op status post total laparoscopic hysterectomy bilateral salpingectomy on 1/15. Here today with partner Leny.       woke up sweating. No other episodes of sweating. No fevers or chills.   Incisions are sometimes itchy, benadryl helps. Glue is still in place.   Tried driving on . Some lower abdominal soreness after.   No longer taking pain meds  Walks around in stores but listening to body and stops if it hurts  Having bowel movements. Took Miralax.   Eating normally. No n/v.   Dark brown spotting, sometimes with wiping, wears a liner.  Questions about sledding, oral sex.    Uterus, cervix, and bilateral fallopian tubes, total hysterectomy with bilateral salpingectomy:  - Secretory-type endometrium  - Myometrial leiomyoma (size: 0.8 cm)  - Unremarkable uterine serosa  - Cervix with benign endocervical microglandular hyperplasia   - Unremarkable bilateral fallopian tubes   - Negative for malignancy     Vitals:    25 1111   BP: 111/75   Pulse: 107   Temp: 97.3  F (36.3  C)   SpO2: 98%   Weight: 78.5 kg (173 lb)   Height: 1.575 m (5' 2\")     Alert, oriented, no distress  Abd: soft, nontender  Incisions: laparoscopic incisions are well approximated, glue is in place over lateral incisions      ASSESSMENT:  24 year old  who is 2w post op s/p gender affirming laparoscopic hysterectomy with bilateral salpingectomy.     PLAN:  1. Status post laparoscopic hysterectomy (Primary)  Continue activity restrictions for 4 more weeks  Post op appt sched   Okay for external/oral sex but no penetration  No sledding or snow tubing  Call if any concerns    Alison Morales MD       "

## 2025-02-10 ENCOUNTER — OFFICE VISIT (OUTPATIENT)
Dept: OBGYN | Facility: CLINIC | Age: 25
End: 2025-02-10
Payer: COMMERCIAL

## 2025-02-10 ENCOUNTER — MYC MEDICAL ADVICE (OUTPATIENT)
Dept: OBGYN | Facility: CLINIC | Age: 25
End: 2025-02-10

## 2025-02-10 VITALS
BODY MASS INDEX: 31.84 KG/M2 | OXYGEN SATURATION: 98 % | HEART RATE: 89 BPM | DIASTOLIC BLOOD PRESSURE: 75 MMHG | WEIGHT: 174.1 LBS | SYSTOLIC BLOOD PRESSURE: 115 MMHG

## 2025-02-10 DIAGNOSIS — N76.0 VAGINAL CUFF CELLULITIS: Primary | ICD-10-CM

## 2025-02-10 DIAGNOSIS — B37.31 VULVOVAGINITIS DUE TO YEAST: ICD-10-CM

## 2025-02-10 DIAGNOSIS — N89.8 VAGINAL DISCHARGE: ICD-10-CM

## 2025-02-10 LAB
CLUE CELLS: ABNORMAL
TRICHOMONAS, WET PREP: ABNORMAL
WBC'S/HIGH POWER FIELD, WET PREP: ABNORMAL
YEAST, WET PREP: PRESENT

## 2025-02-10 PROCEDURE — 99213 OFFICE O/P EST LOW 20 MIN: CPT | Mod: 24 | Performed by: OBSTETRICS & GYNECOLOGY

## 2025-02-10 PROCEDURE — 87210 SMEAR WET MOUNT SALINE/INK: CPT | Performed by: OBSTETRICS & GYNECOLOGY

## 2025-02-10 RX ORDER — SULFAMETHOXAZOLE AND TRIMETHOPRIM 800; 160 MG/1; MG/1
1 TABLET ORAL 2 TIMES DAILY
Qty: 28 TABLET | Refills: 0 | Status: SHIPPED | OUTPATIENT
Start: 2025-02-10 | End: 2025-02-24

## 2025-02-10 RX ORDER — METRONIDAZOLE 500 MG/1
500 TABLET ORAL 2 TIMES DAILY
Qty: 28 TABLET | Refills: 0 | Status: SHIPPED | OUTPATIENT
Start: 2025-02-10 | End: 2025-02-24

## 2025-02-10 RX ORDER — FLUCONAZOLE 150 MG/1
150 TABLET ORAL WEEKLY
Qty: 3 TABLET | Refills: 0 | Status: SHIPPED | OUTPATIENT
Start: 2025-02-10 | End: 2025-02-25

## 2025-02-10 ASSESSMENT — PAIN SCALES - GENERAL: PAINLEVEL_OUTOF10: MODERATE PAIN (5)

## 2025-02-10 NOTE — TELEPHONE ENCOUNTER
Pt had lap hysterectomy 1/15/25    Pt reports increased pain over the weekend. Described as dull and sharp pain at the cuff. Pt taking tylenol and ibuprofen for the pain.    Routing to provider to advise on next steps    Holly Castellanos RN

## 2025-02-10 NOTE — TELEPHONE ENCOUNTER
Called pt at 1216  No answer, LVM  [facility]    Scheduled pt for 1500 per Dr. Andrew Castellanos RN

## 2025-02-10 NOTE — PROGRESS NOTES
GYN CLINIC VISIT  2/10/2025  CC: vaginal cuff pain    HPI:  Paxton Meredith is a 24 year old  transgender male who is 4  weeks post op status post total laparoscopic hysterectomy with bilateral salpingectomy on 1/15. He presents today for evaluation of pelvic pain. He states it started Saturday. Pain is located at top of vagina. He was playing video games when it started. Earlier in the day he walked up a steep hill. Nothing in the vagina. No dysuria, no constipation, no fevers or chills. Has been experiencing light bleeding/ spotting/brown discharge since surgery. This has not increased. He is very anxious something is wrong. He has gotten some relief with ibuprofen and tylenol but still having pain, currently rates it 5/10. Nauseated yesterday, took zofran and it resolved. No vomiting. No nausea today, eating normally.     Vitals:    02/10/25 1537   BP: 115/75   BP Location: Left arm   Patient Position: Sitting   Cuff Size: Adult Regular   Pulse: 89   SpO2: 98%   Weight: 79 kg (174 lb 1.6 oz)     Gen: alert, well appearing  Abd; soft, nontender, incisions well healed  Pelvic: normal external genitalia, digital palpation of cuff demonstrates intact cuff with tenderness on L>R. Speculum exam shows intact cuff with stitches intact. Mild erythema on left side. Moderate amount of tan discharge with white particulate in it.     Wet prep: + yeast      ASSESSMENT:  Paxton Meredith is a 24 year old  who is 4w s/p TLHBS and presents with pain along vaginal cuff concerning for early cuff cellulitis    PLAN:  1. Vaginal cuff cellulitis (Primary)  Recommend treatment with PO antibiotics for at least 1 week. I plan to check in with him next week to see how his pain is doing. Instructed to call or come in if fever, chills, worsening pain.   - metroNIDAZOLE (FLAGYL) 500 MG tablet; Take 1 tablet (500 mg) by mouth 2 times daily for 14 days.  Dispense: 28 tablet; Refill: 0  - sulfamethoxazole-trimethoprim (BACTRIM  DS) 800-160 MG tablet; Take 1 tablet by mouth 2 times daily for 14 days.  Dispense: 28 tablet; Refill: 0    2. Vaginal discharge  - Wet prep - Clinic Collect    3. Vulvovaginitis due to yeast  - fluconazole (DIFLUCAN) 150 MG tablet; Take 1 tablet (150 mg) by mouth once a week for 3 doses.  Dispense: 3 tablet; Refill: 0      Alison Morales MD

## 2025-02-11 ENCOUNTER — TELEPHONE (OUTPATIENT)
Dept: OBGYN | Facility: CLINIC | Age: 25
End: 2025-02-11
Payer: COMMERCIAL

## 2025-02-11 ENCOUNTER — MYC MEDICAL ADVICE (OUTPATIENT)
Dept: OBGYN | Facility: CLINIC | Age: 25
End: 2025-02-11
Payer: COMMERCIAL

## 2025-02-11 NOTE — TELEPHONE ENCOUNTER
[facility] Call Center    Phone Message    May a detailed message be left on voicemail: yes     Reason for Call: Other: Pt states he is positive for yeast infection and Andrew is supposed to be sending a RX to pt pharmacy. Pt is wanting a call back with an update, please advise.      Action Taken: Message routed to:  Other: [facility]    Travel Screening: Not Applicable

## 2025-02-20 ENCOUNTER — OFFICE VISIT (OUTPATIENT)
Dept: OBGYN | Facility: CLINIC | Age: 25
End: 2025-02-20
Payer: COMMERCIAL

## 2025-02-20 VITALS
SYSTOLIC BLOOD PRESSURE: 122 MMHG | WEIGHT: 175.4 LBS | OXYGEN SATURATION: 99 % | BODY MASS INDEX: 32.08 KG/M2 | DIASTOLIC BLOOD PRESSURE: 76 MMHG | HEART RATE: 107 BPM

## 2025-02-20 DIAGNOSIS — Z90.710 STATUS POST LAPAROSCOPIC HYSTERECTOMY: Primary | ICD-10-CM

## 2025-02-20 PROCEDURE — 99024 POSTOP FOLLOW-UP VISIT: CPT | Performed by: OBSTETRICS & GYNECOLOGY

## 2025-02-20 ASSESSMENT — PATIENT HEALTH QUESTIONNAIRE - PHQ9
SUM OF ALL RESPONSES TO PHQ QUESTIONS 1-9: 11
10. IF YOU CHECKED OFF ANY PROBLEMS, HOW DIFFICULT HAVE THESE PROBLEMS MADE IT FOR YOU TO DO YOUR WORK, TAKE CARE OF THINGS AT HOME, OR GET ALONG WITH OTHER PEOPLE: VERY DIFFICULT
SUM OF ALL RESPONSES TO PHQ QUESTIONS 1-9: 11

## 2025-02-22 NOTE — PROGRESS NOTES
GYN Clinic Visit  2025  CC:  Post-operative visit    HPI:  Paxton Meredith is a 24 year old  who presents 5 weeks status post TLH bilateral salpingectomy on 1/15/25 for gender dysphoria and dysmenorrhea.    Since surgery patient reports occasional cramping but overall pain is well controlled and improving. Was seen 2/10 for vaginal discharge, pain and possible cellulitis of vaginal cuff. Antibiotics were prescribed but patient states he did not take them and is feeling better. Appetite is normal. No nausea or vomiting. Voiding without difficulty. Having regular bowel movements. Ambulating.  Planning to return to work on 3/3.     Reviewed PMH, PSH, Meds and allergies.    Objective:  Vitals:    25 1143   BP: 122/76   BP Location: Left arm   Patient Position: Sitting   Cuff Size: Adult Regular   Pulse: 107   SpO2: 99%   Weight: 79.6 kg (175 lb 6.4 oz)     Body mass index is 32.08 kg/m .    General: alert, oriented, no distress  Abd: soft, nontender, nondistended, no masses or organomegaly. well healed laparoscopic incisions.  :   - vulva: normal external genitalia, normal hair pattern, no lesions, normal Bartholin's, normal [place]'s. urethera appears normal and is well supported.   - vagina: normal vaginal mucosa with rugae, no irritation, normal discharge present  - vaginal cuff intact, sutures still present  - bimanual exam confirms intact cuff. No tenderness or masses.    Assessment:  Satisfactory post-operative course.    Plan:   1. Status post laparoscopic hysterectomy (Primary)  Continue activity restrictions for 1 more week, then okay to exercise and no more pelvic rest.     Follow up PRN    Alison Morales MD

## 2025-02-25 ENCOUNTER — PATIENT OUTREACH (OUTPATIENT)
Dept: CARE COORDINATION | Facility: CLINIC | Age: 25
End: 2025-02-25
Payer: COMMERCIAL

## 2025-03-04 ENCOUNTER — MYC MEDICAL ADVICE (OUTPATIENT)
Dept: OBGYN | Facility: CLINIC | Age: 25
End: 2025-03-04
Payer: COMMERCIAL

## 2025-03-23 ENCOUNTER — MYC MEDICAL ADVICE (OUTPATIENT)
Dept: FAMILY MEDICINE | Facility: CLINIC | Age: 25
End: 2025-03-23

## 2025-04-17 ASSESSMENT — SLEEP AND FATIGUE QUESTIONNAIRES
HOW LIKELY ARE YOU TO NOD OFF OR FALL ASLEEP WHILE SITTING AND TALKING TO SOMEONE: WOULD NEVER DOZE
HOW LIKELY ARE YOU TO NOD OFF OR FALL ASLEEP WHILE SITTING INACTIVE IN A PUBLIC PLACE: WOULD NEVER DOZE
HOW LIKELY ARE YOU TO NOD OFF OR FALL ASLEEP WHILE SITTING QUIETLY AFTER LUNCH WITHOUT ALCOHOL: WOULD NEVER DOZE
HOW LIKELY ARE YOU TO NOD OFF OR FALL ASLEEP IN A CAR, WHILE STOPPED FOR A FEW MINUTES IN TRAFFIC: WOULD NEVER DOZE
HOW LIKELY ARE YOU TO NOD OFF OR FALL ASLEEP WHILE SITTING AND READING: SLIGHT CHANCE OF DOZING
HOW LIKELY ARE YOU TO NOD OFF OR FALL ASLEEP WHEN YOU ARE A PASSENGER IN A CAR FOR AN HOUR WITHOUT A BREAK: MODERATE CHANCE OF DOZING
HOW LIKELY ARE YOU TO NOD OFF OR FALL ASLEEP WHILE LYING DOWN TO REST IN THE AFTERNOON WHEN CIRCUMSTANCES PERMIT: SLIGHT CHANCE OF DOZING
HOW LIKELY ARE YOU TO NOD OFF OR FALL ASLEEP WHILE WATCHING TV: SLIGHT CHANCE OF DOZING

## 2025-04-22 ENCOUNTER — OFFICE VISIT (OUTPATIENT)
Dept: SLEEP MEDICINE | Facility: CLINIC | Age: 25
End: 2025-04-22
Attending: FAMILY MEDICINE
Payer: COMMERCIAL

## 2025-04-22 VITALS
HEART RATE: 106 BPM | HEIGHT: 62 IN | OXYGEN SATURATION: 98 % | SYSTOLIC BLOOD PRESSURE: 107 MMHG | BODY MASS INDEX: 31.1 KG/M2 | DIASTOLIC BLOOD PRESSURE: 68 MMHG | WEIGHT: 169 LBS

## 2025-04-22 DIAGNOSIS — R40.0 DAYTIME SLEEPINESS: ICD-10-CM

## 2025-04-22 DIAGNOSIS — R06.83 SNORING: Primary | ICD-10-CM

## 2025-04-22 PROCEDURE — 99205 OFFICE O/P NEW HI 60 MIN: CPT | Performed by: INTERNAL MEDICINE

## 2025-04-22 PROCEDURE — 3074F SYST BP LT 130 MM HG: CPT | Performed by: INTERNAL MEDICINE

## 2025-04-22 PROCEDURE — 3078F DIAST BP <80 MM HG: CPT | Performed by: INTERNAL MEDICINE

## 2025-04-22 PROCEDURE — 1126F AMNT PAIN NOTED NONE PRSNT: CPT | Performed by: INTERNAL MEDICINE

## 2025-04-22 NOTE — NURSING NOTE
"Chief Complaint   Patient presents with    Sleep Problem     Possible sleep apnea. Snoring and some witnessed apnea       Initial /68   Pulse 106   Ht 1.575 m (5' 2\")   Wt 76.7 kg (169 lb)   LMP 12/18/2024 (Approximate)   SpO2 98%   BMI 30.91 kg/m   Estimated body mass index is 30.91 kg/m  as calculated from the following:    Height as of this encounter: 1.575 m (5' 2\").    Weight as of this encounter: 76.7 kg (169 lb).    Medication Reconciliation: complete  ESS 5  Neck circumference: 38 centimeters.  Mansi Pena MA   "

## 2025-04-22 NOTE — PROGRESS NOTES
[facility]   Outpatient Sleep Medicine Consultation  April 22, 2025      Name: Paxton Meredith MRN# [id number]   Age: 24 year old YOB: 2000     Date of Consultation: April 22, 2025  Consultation is requested by: Romy Tesfaye MD  [address] [place] [address] Romy Tesfaye  Primary care provider: Karuna Talley         Reason for Sleep Consult:     Paxton Merdeith is a 24 year old adult for complaints of loud snoring, daytime sleepiness and concern for WILMER         Assessment and Plan:     Summary Sleep Diagnoses:    Loud snoring, Frequent awakenings, Suspected WILMER, Daytime sleepiness  High stop bang score of 4 and very typical symptoms. We discussed the pathophysiology of WILMER including the cardio-neurologic complications of significant untreated sleep apnea as well as the treatment options including CPAP, oral appliances and hypoglossal nerve stimulator.  He will be a good candidate for home sleep study due to the high pre-test probability.          Summary Recommendations:      Orders Placed This Encounter   Procedures    HST- Home Sleep Apnea Test - WatchPat Returnable       Summary Counseling:  See instructions    Counseling included a comprehensive review of diagnostic and therapeutic strategies as well as risks of inadequate therapy.  Educational materials provided in instructions.             History of Present Illness:   I had the pleasure of seeing Paxton Meredith, who is a 24 year old adult with a history of ADHD who presents for concerns of WILMER.  He describes some snoring with witnessed apneas in the past associated with now frequent awakenings and some daytime sleepiness. Snoring is not particularly bothersome to patient's fiance.  He wakes up not feeling very refreshed, Denies snorting self awake and no nocturnal gasping.  Weight has been somewhat stable.  No symptoms suggestive of RLS or central hypersomnia.       SLEEP-WAKE SCHEDULE:     Paxton  HERNANDEZ Meredith     -Describes themself as neither a morning or night person;      -Naps 3-4 times per week for  minutes, feels refreshed after naps; takes no inadvertant naps.     Paxton Meredith    -ON WEEKDAYS, goes to sleep at 10:30 PM during the week; awakens  7:00 AM with an alarm; falls asleep in 30 minutes; denies difficulty falling asleep.     -ON WEEKENDS, goes to sleep at 11:30 PM.  He wakes up at 9:30 AM without an alarm; falls asleep in 30 minutes.       -Awakens 2-4 times a night for 60 minutes before falling back to sleep; awakens to go to the bathroom and external stimuli        SCALES       SLEEP APNEA: Stopbang score - 2       INSOMNIA:  Insomnia severity score: 20       SLEEPINESS: [place] sleepiness scale: 5 [normal < 11]   Drowsy driving/near accidents -None  Consequences: None       PHQ9:     SLEEP COMPLAINTS:  Cardio-respiratory    Snoring- 3/week  Dyspnea- denies  Morning headaches or confusion-denies  Coexisting Lung disease: denies    Coexisting Heart disease: denies    Does patient have a bed partner: denies  Has bed partner been sleeping separately because of snoring:  denies            RLS Screen: When you try to relax in the evening or sleep at  night, do you ever have unpleasant, restless feelings in your  legs that can be relieved by walking or movement? denies    Periodic limb movement: denies    Narcolepsy:  denies sudden urges of sleep attacks  Cataplexy:  denies   Sleep paralysis:  denies    Hallucinations:   denies       Sleep Behaviors:  Leg symptoms/movements: denies  Motor restlessness:denies  Night terrors: denies  Bruxism: denies  Automatic behaviors: none    Other subjective complaints:  Anxiety or rumination denies  Pain and discomfort at  night: denies  Waking up with heart pounding or racing: denies  GERD or aspiration:denies         Parasomnia:   NREM - denies recurrent persistent confusional arousal, night eating, sleep walking or sleep terrors   REM  - denies dream  enactment; injuries   Safety:No issues             Medications:     Current Outpatient Medications   Medication Sig Dispense Refill    buPROPion (WELLBUTRIN XL) 150 MG 24 hr tablet Take 300 mg by mouth every morning.      fluticasone (FLONASE) 50 MCG/ACT nasal spray Spray 1 spray into both nostrils daily. 16 g 0    sertraline (ZOLOFT) 50 MG tablet Take 150 mg by mouth daily.       No current facility-administered medications for this visit.        No Known Allergies         Past Medical History:     Does not need 02 supplement at night   Past Medical History:   Diagnosis Date    Anxiety     Depressive disorder              Past Surgical History:    Previous upper airway surgery - None   Past Surgical History:   Procedure Laterality Date    LAPAROSCOPIC HYSTERECTOMY TOTAL, SALPINGECTOMY BILATERAL Bilateral 1/15/2025    Procedure: HYSTERECTOMY, TOTAL, LAPAROSCOPIC, WITH BILATERAL SALPINGECTOMY, cystoscopy;  Surgeon: Alison Morales MD;  Location: [facility] OR    TRANSGENDER MASTECTOMY Bilateral 7/31/2020    Procedure: Bilateral mastectomy with free nipple grafting;  Surgeon: Mono Altamirano MD;  Location: [facility]            Social History:     Social History     Tobacco Use    Smoking status: Never    Smokeless tobacco: Never   Substance Use Topics    Alcohol use: No         Chemical History:     Tobacco: Never     Uses 1 cups/day of coffee. Last caffeine intake is usually before 7 pm    Supplements for wakefulness: None    EtOH: No None of the patient's responses to the CAGE screening were positive / Negative CAGE score  Recreational Drugs: None    Psych Hx:       11/20/2023     1:33 PM 12/19/2024     2:39 PM 2/20/2025    10:40 AM   PHQ   PHQ-9 Total Score 4 11  11    Q9: Thoughts of better off dead/self-harm past 2 weeks Not at all Not at all Several days   F/U: Thoughts of suicide or self-harm   Yes   F/U: Self harm-plan   Yes   F/U: Self-harm action   No   F/U: Safety concerns   No       Patient-reported     "  Current dangers to self or others:none           Family History:     Family History   Problem Relation Age of Onset    Arthritis Father     Diabetes Maternal Grandmother     Leukemia Paternal Grandmother     Hypertension Paternal Grandfather     Skin Cancer Other     Melanoma Other     Ovarian Cancer Paternal Aunt                Review of Systems:     A complete 10 point review of systems was negative other than HPI or as commented below:   Paxton Meredith has gained 10-15 pounds in 10 years.               Physical Examination:     /68   Pulse 106   Ht 1.575 m (5' 2\")   Wt 76.7 kg (169 lb)   LMP 12/18/2024 (Approximate)   SpO2 98%   BMI 30.91 kg/m    Exam:  Constitutional: healthy, alert, and no distress  Head: Normocephalic. No masses, lesions, tenderness or abnormalities  ENT: ENT exam normal, no neck nodes or sinus tenderness  Cardiovascular: negative, PMI normal. No lifts, heaves, or thrills. RRR. No murmurs, clicks gallops or rub  Respiratory: negative, Percussion normal. Good diaphragmatic excursion. Lungs clear  Gastrointestinal: Abdomen soft, non-tender. BS normal. No masses, organomegaly  : Deferred  Musculoskeletal: extremities normal- no gross deformities noted, gait normal, and normal muscle tone  Skin: no suspicious lesions or rashes  Neurologic: Gait normal. Reflexes normal and symmetric. Sensation grossly WNL.  Psychiatric: mentation appears normal and affect normal/bright  Hematologic/Lymphatic/Immunologic: Normal cervical lymph nodes             Data: All pertinent previous laboratory data reviewed     No results found for: \"PH\", \"PHARTERIAL\", \"PO2\", \"[id number]\", \"SAT\", \"PCO2\", \"HCO3\", \"BASEEXCESS\", \"ANJU\", \"BEB\"  No results found for: \"TSH\"  Lab Results   Component Value Date    GLC 89 07/15/2023     (H) 04/08/2023     Lab Results   Component Value Date    HGB 14.1 01/13/2025    HGB 14.9 11/20/2023     Lab Results   Component Value Date    CR 1.07 07/31/2020     Lab " "Results   Component Value Date    AST 28 04/16/2022    AST 16 12/07/2020    ALT 52 04/16/2022    ALT 30 12/07/2020    ALKPHOS 61 04/16/2022    ALKPHOS 61 12/07/2020    BILITOTAL 0.4 04/16/2022    BILITOTAL 0.8 12/07/2020     No results found for: \"UAMP\", \"UBARB\", \"BENZODIAZEUR\", \"UCANN\", \"UCOC\", \"OPIT\", \"UPCP\"      Copy to: Karuna Talley MD 4/22/2025     I spent 60 minutes on the date of the encounter doing chart review, history and exam, documentation and further coordination as noted above exclusive of time interpreting sleep study         "

## 2025-05-15 NOTE — TELEPHONE ENCOUNTER
Medicare Wellness Visit  Plan for Preventive Care    A good way for you to stay healthy is to use preventive care.  Medicare covers many services that can help you stay healthy.* The goal of these services is to find any health problems as quickly as possible. Finding problems early can help make them easier to treat.  Your personal plan below lists the services you may need and when they are due.      Health Maintenance Summary     Medicare Advantage- Medicare Wellness Visit (Yearly - January to December)  Scheduled for 5/15/2025    Lung Cancer Screening (Yearly)  Scheduled for 5/21/2025    COVID-19 Vaccine (7 - 2024-25 season)  Postponed until 5/16/2025    Influenza Vaccine (Season Ended)  Next due on 9/1/2025    Depression Screening (Yearly)  Next due on 5/10/2026    Colonoscopy (Every 2 Years)  Next due on 10/14/2026    DTaP/Tdap/Td Vaccine (3 - Td or Tdap)  Next due on 4/23/2031    Osteoporosis Screening   Completed    Hepatitis C Screening   Completed    Shingles Vaccine   Completed    Respiratory Syncytial Virus (RSV) Vaccine 60+   Completed    Pneumococcal Vaccine 50+   Completed    Hepatitis A Vaccine   Aged Out    Meningococcal Vaccine   Aged Out    Hepatitis B Vaccine (For Physician/[facility] Discussion)   Aged Out    Meningococcal Serogroup B Vaccine   Aged Out    HPV Vaccine   Aged Out    Breast Cancer Screening   Discontinued           Preventive Care for Women and Men    Heart Screenings (Cardiovascular):  Blood tests are used to check your cholesterol, lipid and triglyceride levels. High levels can increase your risk for heart disease and stroke. High levels can be treated with medications, diet and exercise. Lowering your levels can help keep your heart and blood vessels healthy.  Your provider will order these tests if they are needed.    An ultrasound is done to see if you have an abdominal aortic aneurysm (AAA).  This is an enlargement of one of the main blood vessels that delivers blood to the body.  Yes it is ok to give 2ml vials so that it can be administered appropriately.     Thank you,    Lisa Hernandez MD  [facility]  PGY-2  Pager #: [phone]       In the [place], 9,000 deaths are caused by AAA.  You may not even know you have this problem and as many as 1 in 3 people will have a serious problem if it is not treated.  Early diagnosis allows for more effective treatment and cure.  If you have a family history of AAA or are a male age 65-75 who has smoked, you are at higher risk of an AAA.  Your provider can order this test, if needed.    Colorectal Screening:  There are many tests that are used to check for cancer of your colon and rectum. You and your provider should discuss what test is best for you and when to have it done.  Options include:  Screening Colonoscopy: exam of the entire colon, seen through a flexible lighted tube.  Flexible Sigmoidoscopy: exam of the last third (sigmoid portion) of the colon and rectum, seen through a flexible lighted tube.  Cologuard DNA stool test: a sample of your stool is used to screen for cancer and unseen blood in your stool.  Fecal Occult Blood Test: a sample of your stool is studied to find any unseen blood    Flu Shot:  An immunization that helps to prevent influenza (the flu). You should get this every year. The best time to get the shot is in the fall.    Pneumococcal Shot:  Vaccines help prevent pneumococcal disease, which is any type of illness caused by Streptococcus pneumoniae bacteria. There are two kinds of pneumococcal vaccines available in the [place]:   Pneumococcal conjugate vaccines (PCV20 or [id number]®)  Pneumococcal polysaccharide vaccine (PPSV23 or Rbnrkzjuh94®)  For those who have never received any pneumococcal conjugate vaccine, CDC recommends PVC20 for adults 65 years or older and adults 19 through 64 years old with certain medical conditions or risk factors.   For those who have previously received PCV13, this should be followed by a dose of PPSV23.     Hepatitis B Shot:  An immunization that helps to protect people from getting Hepatitis B. Hepatitis B is a virus that spreads  through contact with infected blood or body fluids. Many people with the virus do not have symptoms.  The virus can lead to serious problems, such as liver disease. Some people are at higher risk than others. Your doctor will tell you if you need this shot.     Diabetes Screening:  A test to measure sugar (glucose) in your blood is called a fasting blood sugar. Fasting means you cannot have food or drink for at least 8 hours before the test. This test can detect diabetes long before you may notice symptoms.    Glaucoma Screening:  Glaucoma screening is performed by your eye doctor. The test measures the fluid pressure inside your eyes to determine if you have glaucoma.     Hepatitis C Screening:  A blood test to see if you have the hepatitis C virus.  Hepatitis C attacks the liver and is a major cause of chronic liver disease.  Medicare will cover a single screening for all adults born between 1945 & 1965, or high risk patients (people who have injected illegal drugs or people who have had blood transfusions).  High risk patients who continue to inject illegal drugs can be screened for Hepatitis C every year.    Smoking and Tobacco-Use Cessation Counseling:  Tobacco is the single greatest cause of disease and early death in our country today. Medication and counseling together can increase a person’s chance of quitting for good.   Medicare covers two quitting attempts per year, with four counseling sessions per attempt (eight sessions in a 12 month period)    Preventive Screening tests for Women    Screening Mammograms and Breast Exams:  An x-ray of your breasts to check for breast cancer before you or your doctor may be able to feel it.  If breast cancer is found early it can usually be treated with success.    Pelvic Exams and Pap Tests:  An exam to check for cervical and vaginal cancer. A Pap test is a lab test in which cells are taken from your cervix and sent to the lab to look for signs of cervical cancer. If  cancer of the cervix is found early, chances for a cure are good. Testing can generally end at age 65, or if a woman has a hysterectomy for a benign condition. Your provider may recommend more frequent testing if certain abnormal results are found.    Bone Mass Measurements:  A painless x-ray of your bone density to see if you are at risk for a broken bone. Bone density refers to the thickness of bones or how tightly the bone tissue is packed.    Preventive Screening tests for Men    Prostate Screening:  Should you have a prostate cancer test (PSA)?  It is up to you to decide if you want a prostate cancer test. Talk to your clinician to find out if the test is right for you.  Things for you to consider and talk about should include:  Benefits and harms of the test  Your family history  How your race/ethnicity may influence the test  If the test may impact other medical conditions you have  Your values on screenings and treatments    *Medicare pays for many preventive services to keep you healthy. For some of these services, you might have to pay a deductible, coinsurance, and / or copayment.  The amounts vary depending on the type of services you need and the kind of Medicare health plan you have.    For further details on screenings offered by Medicare please visit: https://www.medicare.gov/coverage/preventive-screening-services

## 2025-05-17 ENCOUNTER — HEALTH MAINTENANCE LETTER (OUTPATIENT)
Age: 25
End: 2025-05-17

## 2025-06-03 ENCOUNTER — OFFICE VISIT (OUTPATIENT)
Dept: URGENT CARE | Facility: URGENT CARE | Age: 25
End: 2025-06-03
Payer: COMMERCIAL

## 2025-06-03 VITALS
DIASTOLIC BLOOD PRESSURE: 70 MMHG | HEIGHT: 62 IN | RESPIRATION RATE: 18 BRPM | SYSTOLIC BLOOD PRESSURE: 108 MMHG | BODY MASS INDEX: 30.73 KG/M2 | WEIGHT: 167 LBS | TEMPERATURE: 97.7 F | OXYGEN SATURATION: 99 % | HEART RATE: 83 BPM

## 2025-06-03 DIAGNOSIS — L02.92 FURUNCLE OF SKIN OR SUBCUTANEOUS TISSUE: Primary | ICD-10-CM

## 2025-06-03 PROCEDURE — 3078F DIAST BP <80 MM HG: CPT | Performed by: PHYSICIAN ASSISTANT

## 2025-06-03 PROCEDURE — 1125F AMNT PAIN NOTED PAIN PRSNT: CPT | Performed by: PHYSICIAN ASSISTANT

## 2025-06-03 PROCEDURE — 99213 OFFICE O/P EST LOW 20 MIN: CPT | Performed by: PHYSICIAN ASSISTANT

## 2025-06-03 PROCEDURE — 3074F SYST BP LT 130 MM HG: CPT | Performed by: PHYSICIAN ASSISTANT

## 2025-06-03 RX ORDER — NAPROXEN 500 MG/1
500 TABLET ORAL 2 TIMES DAILY WITH MEALS
Qty: 30 TABLET | Refills: 0 | Status: SHIPPED | OUTPATIENT
Start: 2025-06-03

## 2025-06-03 RX ORDER — SULFAMETHOXAZOLE AND TRIMETHOPRIM 800; 160 MG/1; MG/1
1 TABLET ORAL 2 TIMES DAILY
Qty: 20 TABLET | Refills: 0 | Status: SHIPPED | OUTPATIENT
Start: 2025-06-03

## 2025-06-03 ASSESSMENT — PAIN SCALES - GENERAL: PAINLEVEL_OUTOF10: SEVERE PAIN (7)

## 2025-06-03 NOTE — PROGRESS NOTES
Urgent Care Clinic Visit    Chief Complaint   Patient presents with    Vaginal Problem     Possible boil on labia x1 week-there was some drainage last week and grown in size w/pain               6/3/2025     6:39 PM   Additional Questions   Roomed by Fina   Accompanied by Cha         6/3/2025     6:39 PM   Patient Reported Additional Medications   Patient reports taking the following new medications Tylenol PRN, Lisdexamphetamine 30 mg daily

## 2025-06-04 NOTE — PROGRESS NOTES
Assessment & Plan     Furuncle of skin or subcutaneous tissue    A skin abscess is a bacterial infection that forms a pocket of pus. A boil is a kind of skin abscess. The doctor may have cut an opening in the abscess so that the pus can drain out. You may have gauze in the cut so that the abscess will stay open and keep draining. You may need antibiotics. You will need to follow up with your doctor to make sure the infection has gone away.  The doctor has checked you carefully, but problems can develop later. If you notice any problems or new symptoms, get medical treatment right away.  Follow-up care is a key part of your treatment and safety    This is a localized right labial small cyst that is infected  This does not appear to be a bartholin cyst and does not need to be drained at this time  It is small and indurated and in the upper levels of the skin    Will treat with medications    - sulfamethoxazole-trimethoprim (BACTRIM DS) 800-160 MG tablet  Dispense: 20 tablet; Refill: 0  - naproxen (NAPROSYN) 500 MG tablet  Dispense: 30 tablet; Refill: 0       Warm moist compresses      No follow-ups on file.    Josh Cheng, [facility], PA-C  [facility] NICOLE Matta is a 24 year old adult who presents to clinic today for the following health issues:  Chief Complaint   Patient presents with    Vaginal Problem     Possible boil on labia x1 week-there was some drainage last week and grown in size w/pain         6/3/2025     6:39 PM   Additional Questions   Roomed by Fina   Accompanied by Cha         6/3/2025     6:39 PM   Patient Reported Additional Medications   Patient reports taking the following new medications Tylenol PRN, Lisdexamphetamine 30 mg daily     HPI  Review of Systems  Constitutional, HEENT, cardiovascular, pulmonary, gi and gu systems are negative, except as otherwise noted.      Objective    /70 (BP Location: Right arm, Patient Position: Sitting, Cuff Size:  "Adult Regular)   Pulse 83   Temp 97.7  F (36.5  C) (Tympanic)   Resp 18   Ht 1.575 m (5' 2\")   Wt 75.8 kg (167 lb)   LMP 12/18/2024 (Approximate)   SpO2 99%   BMI 30.54 kg/m    Physical Exam   GENERAL: alert and no distress  ABDOMEN: soft, nontender, no hepatosplenomegaly, no masses and bowel sounds normal   (female): normal urethral meatus , normal vaginal mucosa, and normal cervix, adnexae, and uterus without masses.  MS: no gross musculoskeletal defects noted, no edema  SKIN: pos for small cyst superficial furuncle in right labia  NEURO: Normal strength and tone, mentation intact and speech normal  PSYCH: mentation appears normal, affect normal/bright          "

## 2025-06-16 ENCOUNTER — OFFICE VISIT (OUTPATIENT)
Dept: URGENT CARE | Facility: URGENT CARE | Age: 25
End: 2025-06-16
Payer: COMMERCIAL

## 2025-06-16 VITALS
WEIGHT: 165 LBS | RESPIRATION RATE: 16 BRPM | OXYGEN SATURATION: 100 % | HEART RATE: 88 BPM | HEIGHT: 62 IN | TEMPERATURE: 97.3 F | SYSTOLIC BLOOD PRESSURE: 109 MMHG | DIASTOLIC BLOOD PRESSURE: 75 MMHG | BODY MASS INDEX: 30.36 KG/M2

## 2025-06-16 DIAGNOSIS — Z20.822 EXPOSURE TO 2019 NOVEL CORONAVIRUS: ICD-10-CM

## 2025-06-16 DIAGNOSIS — J02.9 SORE THROAT: Primary | ICD-10-CM

## 2025-06-16 LAB
DEPRECATED S PYO AG THROAT QL EIA: NEGATIVE
S PYO DNA THROAT QL NAA+PROBE: NOT DETECTED

## 2025-06-16 PROCEDURE — 99213 OFFICE O/P EST LOW 20 MIN: CPT | Performed by: PHYSICIAN ASSISTANT

## 2025-06-16 PROCEDURE — 3074F SYST BP LT 130 MM HG: CPT | Performed by: PHYSICIAN ASSISTANT

## 2025-06-16 PROCEDURE — 3078F DIAST BP <80 MM HG: CPT | Performed by: PHYSICIAN ASSISTANT

## 2025-06-16 PROCEDURE — 87635 SARS-COV-2 COVID-19 AMP PRB: CPT | Performed by: PHYSICIAN ASSISTANT

## 2025-06-16 PROCEDURE — 87651 STREP A DNA AMP PROBE: CPT | Performed by: PHYSICIAN ASSISTANT

## 2025-06-16 ASSESSMENT — ENCOUNTER SYMPTOMS
COUGH: 1
FEVER: 0
SHORTNESS OF BREATH: 0
SORE THROAT: 1

## 2025-06-16 NOTE — LETTER
2025    Paxton Meredith   2000        To Whom it May Concern;    Please excuse Paxton Meredith from work/school for a healthcare visit on 2025.  Patient may not return to work until covid test is negative - pending.    Sincerely,        Micaela Mustafa PA

## 2025-06-16 NOTE — PROGRESS NOTES
Urgent Care Clinic Visit    Chief Complaint   Patient presents with    Urgent Care    Pharyngitis     Cough since Friday, sore throat.               6/16/2025    10:44 AM   Additional Questions   Roomed by Victor M JOHN     No  Does the patient have a sore throat and either history of fever >100.4 in the previous 24 hours without a cough or recent exposure to a known case of strep throat? Yes

## 2025-06-16 NOTE — PROGRESS NOTES
"covidSUBJECTIVE:   Paxton Meredith is a 24 year old adult presenting with a chief complaint of   Chief Complaint   Patient presents with    Urgent Care    Pharyngitis     Cough since Friday, sore throat.       She is an established patient of [facility] since this morning.  Cough x 3 days.  No covid test.  Last had covid years ago.    Treatment:  nothing.    Review of Systems   Constitutional:  Negative for fever.   HENT:  Positive for sore throat.    Respiratory:  Positive for cough. Negative for shortness of breath.    All other systems reviewed and are negative.      Past Medical History:   Diagnosis Date    Anxiety     Depressive disorder      Family History   Problem Relation Age of Onset    Arthritis Father     Diabetes Maternal Grandmother     Leukemia Paternal Grandmother     Hypertension Paternal Grandfather     Skin Cancer Other     Melanoma Other     Ovarian Cancer Paternal Aunt      Current Outpatient Medications   Medication Sig Dispense Refill    buPROPion (WELLBUTRIN XL) 150 MG 24 hr tablet Take 300 mg by mouth every morning. (Patient taking differently: Take 150 mg by mouth every morning.)      fluticasone (FLONASE) 50 MCG/ACT nasal spray Spray 1 spray into both nostrils daily. 16 g 0    naproxen (NAPROSYN) 500 MG tablet Take 1 tablet (500 mg) by mouth 2 times daily (with meals). 30 tablet 0    sertraline (ZOLOFT) 100 MG tablet Take 200 mg by mouth daily.      sulfamethoxazole-trimethoprim (BACTRIM DS) 800-160 MG tablet Take 1 tablet by mouth 2 times daily. 20 tablet 0     Social History     Tobacco Use    Smoking status: Never    Smokeless tobacco: Never   Substance Use Topics    Alcohol use: No       OBJECTIVE  /75   Pulse 88   Temp 97.3  F (36.3  C) (Tympanic)   Resp 16   Ht 1.575 m (5' 2\")   Wt 74.8 kg (165 lb)   LMP 12/18/2024 (Approximate)   SpO2 100%   BMI 30.18 kg/m      Physical Exam  Vitals and nursing note reviewed.   Constitutional:       Appearance: Normal appearance. " He is obese.   HENT:      Head: Normocephalic and atraumatic.      Right Ear: Tympanic membrane, ear canal and external ear normal.      Left Ear: Tympanic membrane, ear canal and external ear normal.      Nose: Nose normal.      Mouth/Throat:      Mouth: Mucous membranes are moist.      Pharynx: Oropharynx is clear. Posterior oropharyngeal erythema present.   Eyes:      Extraocular Movements: Extraocular movements intact.      Conjunctiva/sclera: Conjunctivae normal.   Cardiovascular:      Rate and Rhythm: Normal rate and regular rhythm.      Pulses: Normal pulses.      Heart sounds: Normal heart sounds.   Pulmonary:      Effort: Pulmonary effort is normal.      Breath sounds: Normal breath sounds.   Musculoskeletal:      Cervical back: Normal range of motion.   Lymphadenopathy:      Cervical: No cervical adenopathy.   Skin:     General: Skin is warm and dry.      Findings: No rash.   Neurological:      General: No focal deficit present.      Mental Status: He is alert.   Psychiatric:         Mood and Affect: Mood normal.         Behavior: Behavior normal.         Labs:  Results for orders placed or performed in visit on 06/16/25 (from the past 24 hours)   Streptococcus A Rapid Screen w/Reflex to PCR - Clinic Collect    Specimen: Throat; Swab   Result Value Ref Range    Group A Strep antigen Negative Negative       ASSESSMENT:      ICD-10-CM    1. Sore throat  J02.9 Streptococcus A Rapid Screen w/Reflex to PCR - Clinic Collect     Group A Streptococcus PCR Throat Swab      2. Exposure to 2019 novel coronavirus  Z20.822 COVID-19 Virus (Coronavirus) by PCR           Medical Decision Making:    Differential Diagnosis:  URI Adult/Peds:  Strep pharyngitis, Viral pharyngitis, and Viral syndrome    Serious Comorbid Conditions:  Adult:  reviewed    PLAN:    Tylenol/motrin as needed.  Drink plenty of water.  Gargle with salt water.  May use chloraseptic spray as directed for ST.  Strep pcr pending.  Note for work.   Discussed and recommended CDC guidelines for self isolation.  COVID test pending.        Followup:    If not improving or if condition worsens, follow up with your Primary Care Provider, If not improving or if conditions worsens over the next 12-24 hours, go to the Emergency Department    There are no Patient Instructions on file for this visit.

## 2025-06-17 LAB — SARS-COV-2 RNA RESP QL NAA+PROBE: NEGATIVE

## (undated) DEVICE — Device

## (undated) DEVICE — DRAIN JACKSON PRATT CHANNEL 15FR ROUND HUBLESS SIL JP-2228

## (undated) DEVICE — SOL WATER IRRIG 1000ML BOTTLE 2F7114

## (undated) DEVICE — DRAPE IOBAN INCISE 23X17" 6650EZ

## (undated) DEVICE — BLADE KNIFE SURG 15 371115

## (undated) DEVICE — LABEL MEDICATION SYSTEM 3303-P

## (undated) DEVICE — SYR 30ML LL W/O NDL 302832

## (undated) DEVICE — BNDG ELASTIC 6"X5YDS STERILE 6611-6S

## (undated) DEVICE — LINEN TOWEL PACK X30 5481

## (undated) DEVICE — SU ETHILON 3-0 PS-1 18" 1663H

## (undated) DEVICE — GLOVE ESTEEM BLUE W/NEU-THERA 7.5  2D73PB75

## (undated) DEVICE — DRSG COTTON BALL 6PK LCB62

## (undated) DEVICE — SU CHROMIC 3-0 FS-2 27" 636

## (undated) DEVICE — HEMOSTAT ABSORBABLE AGENT ARISTA 3GM POWDER SM0002-USA

## (undated) DEVICE — SU PLAIN FAST ABSORB 5-0 PC-1 18" 1915G

## (undated) DEVICE — LINEN TOWEL PACK X6 WHITE 5487

## (undated) DEVICE — DRSG XEROFORM 5X9" CUR253590W

## (undated) DEVICE — PITCHER STERILE 1000ML  SSK9004A

## (undated) DEVICE — ESU ELEC BLADE 2.75" COATED/INSULATED E1455

## (undated) DEVICE — STPL SKIN PROXIMATE 35 WIDE PMW35

## (undated) DEVICE — CLOSURE SYS SKIN PREMIERPRO EXOFINFUSION 4X60CM 3473

## (undated) DEVICE — PREP CHLORAPREP 26ML TINTED ORANGE  260815

## (undated) DEVICE — SU MONOCRYL 2-0 SH 27" UND Y417H

## (undated) DEVICE — PEN MARKING W/RULER DYNJSM04

## (undated) DEVICE — SUCTION TIP YANKAUER STR K87

## (undated) DEVICE — SU STRATAFIX MONOCRYL 3-0 SPIRAL PS-2 45CM SXMP1B107

## (undated) DEVICE — ESU HOLSTER PLASTIC DISP E2400

## (undated) DEVICE — DRAPE U SPLIT 74X120" 29440

## (undated) DEVICE — PAD CHUX UNDERPAD 30X36" P3036C

## (undated) DEVICE — SYR BULB IRRIG 50ML LATEX FREE 0035280

## (undated) DEVICE — SU PDS II 2-0 CT-1 27" Z339H

## (undated) DEVICE — NDL SPINAL 20GA 3.5" 405182

## (undated) DEVICE — DRAIN JACKSON PRATT RESERVOIR 100ML SU130-1305

## (undated) DEVICE — DRSG KERLIX 4 1/2"X4YDS ROLL 6715

## (undated) DEVICE — DRSG TEGADERM 4X4 3/4" 1626W

## (undated) DEVICE — ESU GROUND PAD ADULT W/CORD E7507

## (undated) DEVICE — GLOVE PROTEXIS POWDER FREE 7.5 ORTHOPEDIC 2D73ET75

## (undated) DEVICE — ESU PENCIL SMOKE EVAC W/ROCKER SWITCH 0703-047-000

## (undated) RX ORDER — EPINEPHRINE 1 MG/ML
INJECTION, SOLUTION INTRAMUSCULAR; SUBCUTANEOUS
Status: DISPENSED
Start: 2020-07-31

## (undated) RX ORDER — HYDROMORPHONE HYDROCHLORIDE 1 MG/ML
INJECTION, SOLUTION INTRAMUSCULAR; INTRAVENOUS; SUBCUTANEOUS
Status: DISPENSED
Start: 2020-07-31

## (undated) RX ORDER — FENTANYL CITRATE 50 UG/ML
INJECTION, SOLUTION INTRAMUSCULAR; INTRAVENOUS
Status: DISPENSED
Start: 2020-07-31

## (undated) RX ORDER — MINERAL OIL
OIL (ML) MISCELLANEOUS
Status: DISPENSED
Start: 2020-07-31

## (undated) RX ORDER — PROPOFOL 10 MG/ML
INJECTION, EMULSION INTRAVENOUS
Status: DISPENSED
Start: 2020-07-31

## (undated) RX ORDER — ONDANSETRON 2 MG/ML
INJECTION INTRAMUSCULAR; INTRAVENOUS
Status: DISPENSED
Start: 2020-07-31

## (undated) RX ORDER — HYDROMORPHONE HCL/0.9% NACL/PF 0.2MG/0.2
SYRINGE (ML) INTRAVENOUS
Status: DISPENSED
Start: 2020-07-31

## (undated) RX ORDER — FENTANYL CITRATE-0.9 % NACL/PF 10 MCG/ML
PLASTIC BAG, INJECTION (ML) INTRAVENOUS
Status: DISPENSED
Start: 2020-07-31

## (undated) RX ORDER — LIDOCAINE HYDROCHLORIDE 20 MG/ML
INJECTION, SOLUTION EPIDURAL; INFILTRATION; INTRACAUDAL; PERINEURAL
Status: DISPENSED
Start: 2020-07-31

## (undated) RX ORDER — CEFAZOLIN SODIUM 1 G/3ML
INJECTION, POWDER, FOR SOLUTION INTRAMUSCULAR; INTRAVENOUS
Status: DISPENSED
Start: 2020-07-31

## (undated) RX ORDER — ESMOLOL HYDROCHLORIDE 10 MG/ML
INJECTION INTRAVENOUS
Status: DISPENSED
Start: 2020-07-31

## (undated) RX ORDER — LIDOCAINE HYDROCHLORIDE 10 MG/ML
INJECTION, SOLUTION EPIDURAL; INFILTRATION; INTRACAUDAL; PERINEURAL
Status: DISPENSED
Start: 2020-07-31

## (undated) RX ORDER — DEXAMETHASONE SODIUM PHOSPHATE 4 MG/ML
INJECTION, SOLUTION INTRA-ARTICULAR; INTRALESIONAL; INTRAMUSCULAR; INTRAVENOUS; SOFT TISSUE
Status: DISPENSED
Start: 2020-07-31